# Patient Record
Sex: FEMALE | Race: WHITE | Employment: UNEMPLOYED | URBAN - METROPOLITAN AREA
[De-identification: names, ages, dates, MRNs, and addresses within clinical notes are randomized per-mention and may not be internally consistent; named-entity substitution may affect disease eponyms.]

---

## 2017-02-08 ENCOUNTER — TELEPHONE (OUTPATIENT)
Dept: FAMILY MEDICINE CLINIC | Age: 52
End: 2017-02-08

## 2017-02-08 NOTE — TELEPHONE ENCOUNTER
Pt stated the Dr doing her neck surg will not sched her surg until she gets some information from her Dr. She didn't know what Dr or what information. I informed to call the surg office and speak to his nurse and get that information in order to expedite the process. Pt verbalized understanding.

## 2017-02-21 ENCOUNTER — OFFICE VISIT (OUTPATIENT)
Dept: NEUROLOGY | Age: 52
End: 2017-02-21

## 2017-02-21 VITALS
RESPIRATION RATE: 20 BRPM | TEMPERATURE: 98.4 F | DIASTOLIC BLOOD PRESSURE: 76 MMHG | HEART RATE: 92 BPM | SYSTOLIC BLOOD PRESSURE: 128 MMHG | HEIGHT: 60 IN | OXYGEN SATURATION: 97 % | WEIGHT: 202.6 LBS | BODY MASS INDEX: 39.78 KG/M2

## 2017-02-21 DIAGNOSIS — R56.9 SEIZURE (HCC): Primary | ICD-10-CM

## 2017-02-21 NOTE — PROGRESS NOTES
Neurology Consult      Subjective:   History of  Araceli Motta is a 46 y.o. female  who comes in with her daughter as her principal spokesperson. The history totally from patient and daughter includes the following. Says he had an initial febrile state and comatose for prolonged period of 3 months. I have no idea about the intimate details or ramifications. Then later at age 11 had her first seizure in [place]. Would end up going through a series of different medications that either did not work and/or could not tolerate. Family history negative for seizures. Is not aware of any other  history or additional details as it might apply to trauma additional infections etc. has spent 20+ years at [facility] in the neurology department with seizure management. Is currently on Keppra 750 mg twice daily and Lamictal 200 mg twice daily and carbamazepine 300 mg twice daily and Klonopin 0.5 mg 3 times daily. Has been on this combination of medicines in recent years. Cannot go higher on the Keppra due to sedative effects etc. they have tried to decrease several of her seizure medicines with increasing seizure production thereafter. Phenobarbital causes hallucinations. Currently has a daily random staring episode and muscle tension for several minutes. Says she feels funny before hand and this means a combination of feeling fainty and scared. Has her episode for several minutes and is postictal for at least several seconds. Then has an urgency to go to the bathroom and urinate. Has had grand mal seizures which sound secondarily generalized by type, and none in recent years. Apparently no true warning of significance and has a sudden generalized tonic-clonic event of 5-6 minutes and is postictal for 10+ minutes. Claims compliance with medicine and gets adequate sleep. Was in special education and apparently had a modified high school diploma? Never gainfully employed.   Always in the accompaniment of family as she is currently with her daughter. Last EEG in the last year or 2 and results unknown. Last scan was 5+ years ago perhaps MRI and demonstrating tissue damage in what she says is the left temporal lobe. Apparently with neck symptoms and an MRI of the neck in November with moderate to severe central spinal stenosis with DDD and DJD C5-6 through C7-T1. Apparently a consideration for surgery but [facility] says they do not approve of her going under general anesthesia with her seizure history. She happens to have an appointment in April with the [address] [place] [address] staff. Current Outpatient Prescriptions   Medication Sig Dispense Refill    carBAMazepine ER (CARBATROL ER) 300 mg capsule Take 300 mg by mouth two (2) times a day.  levETIRAcetam (KEPPRA) 750 mg tablet Take 750 mg by mouth two (2) times a day.  lamoTRIgine (LAMICTAL) 200 mg tablet Take 200 mg by mouth two (2) times a day.  clonazePAM (KLONOPIN) 0.5 mg tablet Take 0.5 mg by mouth three (3) times daily. Allergies   Allergen Reactions    Aspirin Other (comments)     Chest pain    Codeine Other (comments)     Chest pain      Phenobarbital Other (comments)     hallucinate     Past Medical History   Diagnosis Date    Degenerative disc disease, cervical      C2 C3    Headache     Seizures (HCC)       Past Surgical History   Procedure Laterality Date    Hx orthopaedic       right knee      Social History     Social History    Marital status: SINGLE     Spouse name: N/A    Number of children: N/A    Years of education: N/A     Occupational History    Not on file.      Social History Main Topics    Smoking status: Never Smoker    Smokeless tobacco: Never Used    Alcohol use No    Drug use: No    Sexual activity: No     Other Topics Concern    Not on file     Social History Narrative      Family History   Problem Relation Age of Onset    Hypertension Father     Diabetes Maternal Grandmother     Emphysema Mother Visit Vitals    /76    Pulse 92    Temp 98.4 °F (36.9 °C) (Oral)    Resp 20    Ht 5' (1.524 m)    Wt 91.9 kg (202 lb 9.6 oz)    SpO2 97%    BMI 39.57 kg/m2        Review of Systems:   A comprehensive review of systems was negative except for that written in the HPI. Neuro Exam:     Appearance: The patient is well developed, well nourished, provides a partial coherent history and is in no acute distress. Mental Status: Oriented to time, place and person. Mood and affect appropriate. Cranial Nerves:   Intact visual fields. Fundi are benign. TANNER, EOM's full, no nystagmus, no ptosis. Facial sensation is normal. Corneal reflexes are intact. Facial movement is symmetric. Hearing is normal bilaterally. Palate is midline with normal sternocleidomastoid and trapezius muscles are normal. Tongue is midline. Motor:  5/5 strength in upper and lower proximal and distal muscles. Normal bulk and tone. No fasciculations. Reflexes:   Deep tendon reflexes 2+/4 and symmetrical.   Sensory:   Normal to touch, pinprick and vibration. Gait:  Normal gait. Tremor:   No tremor noted. Cerebellar:  No cerebellar signs present. Neurovascular:  Normal heart sounds and regular rhythm, peripheral pulses intact, and no carotid bruits. Assessment:   History of intractable seizures. I respect 20+ years of seizure surveillance at the [facility]. Would suggest she keep her April appointment with the neurology clinic and find out why they are anxious about her going under general anesthesia. Currently has daily seizures of complex partial type by description. Potential for secondarily generalized seizures as mentioned in HPI. Will not manipulate medicine and information provided solely between patient and daughter. Plan:   RV as needed. ...    Signed by :  Shady Castro MD

## 2017-02-21 NOTE — MR AVS SNAPSHOT
Visit Information Date & Time Provider Department Dept. Phone Encounter #  
 2/21/2017  2:00 PM Elizabeth Brennan MD Neurology [phone] Follow-up Instructions Return if symptoms worsen or fail to improve. Upcoming Health Maintenance Date Due Hepatitis C Screening 1965 DTaP/Tdap/Td series (1 - Tdap) 6/17/1986 PAP AKA CERVICAL CYTOLOGY 6/17/1986 BREAST CANCER SCRN MAMMOGRAM 6/17/2015 FOBT Q 1 YEAR AGE 50-75 6/17/2015 INFLUENZA AGE 9 TO ADULT 8/1/2016 Allergies as of 2/21/2017  Review Complete On: 11/9/2016 By: Chaitanya Melgoza NP Severity Noted Reaction Type Reactions Aspirin  06/01/2015    Other (comments) Chest pain Codeine  06/01/2015    Other (comments) Chest pain Phenobarbital  06/01/2015    Other (comments)  
 hallucinate Current Immunizations  Never Reviewed No immunizations on file. Not reviewed this visit You Were Diagnosed With   
  
 Codes Comments Seizure ([facility].)    -  Primary ICD-10-CM: R56.9 ICD-9-CM: 780.39 Vitals BP Pulse Temp Resp Height(growth percentile) Weight(growth percentile) 128/76 92 98.4 °F (36.9 °C) (Oral) 20 5' (1.524 m) 202 lb 9.6 oz (91.9 kg) SpO2 BMI OB Status Smoking Status 97% 39.57 kg/m2 Menopause Never Smoker Vitals History BMI and BSA Data Body Mass Index Body Surface Area  
 39.57 kg/m 2 1.97 m 2 Preferred Pharmacy Pharmacy Name Phone [id number] [phone] Your Updated Medication List  
  
   
This list is accurate as of: 2/21/17  2:33 PM.  Always use your most recent med list.  
  
  
  
  
 carBAMazepine  mg capsule Commonly known as:  CARBATROL ER Take 300 mg by mouth two (2) times a day. clonazePAM 0.5 mg tablet Commonly known as:  Catha Boni Take 0.5 mg by mouth three (3) times daily. lamoTRIgine 200 mg tablet Commonly known as: LaMICtal  
Take 200 mg by mouth two (2) times a day. levETIRAcetam 750 mg tablet Commonly known as:  KEPPRA Take 750 mg by mouth two (2) times a day. Follow-up Instructions Return if symptoms worsen or fail to improve. Patient Instructions Information Regarding Testing If you have physican order for a test or a medication denied by your insurance company, this does not mean the test or medication is not appropriate for you as that is a medical decision, not a decision to be made by an insurance company representative or by an [facility] physician who has not interviewed and examined you. This is a decision to be made between you and your physician. The denial of services is a contractual matter between you and your insurance company, not an issue between your physician and the insurance company. If your test or medication is denied, you can take the following steps to help resolve the issue: 1. File a complaint with the [facility] regarding your insurance company's denial of services ordered for you. You can do this either by calling them directly or by completing an on-line complaint form on the [employer]. This can be found at [email or web address] 2. Also file a formal complaint with your insurance company and ask to have the name of the person denying the service so that you may explore a legal option should you be harmed by this denial of service. Again, the fact the insurance company will not pay for the service does not mean it is not medically necessary and I would encourage you to follow through with the plan that was made with your physician 3.    File a written complaint with your employer so your employer and benefit manager is aware of the poor coverage they are providing their employees. If you have medicare/medicaid, complain to your representative in the House and to your [place]  [employer]. If we have ordered testing for you, we do not call patients with results and we do not give test results over the phone. We schedule follow up appointments so that your results can be discussed in person and any questions you have regarding them may be addressed. If something of concern is revealed on your test, we will call you for a sooner follow up appointment. Additionally, results may be found by using the My Chart feature and one of our patient service representatives at the  can give you instructions on how to access this feature of our electronic medical record system. Seizure: Care Instructions Your Care Instructions Seizures are caused by abnormal patterns of electrical signals in the brain. They are different for each person. Seizures can affect movement, speech, vision, or awareness. Some people have only slight shaking of a hand and do not pass out. Other people may pass out and have violent shaking of the whole body. Some people appear to stare into space. They are awake, but they can't respond normally. Later, they may not remember what happened. You may need tests to identify the type and cause of the seizures. A seizure may occur only once, or you may have them more than one time. Taking medicines as directed and following up with your doctor may help keep you from having more seizures. The doctor has checked you carefully, but problems can develop later. If you notice any problems or new symptoms, get medical treatment right away. Follow-up care is a key part of your treatment and safety. Be sure to make and go to all appointments, and call your doctor if you are having problems. It's also a good idea to know your test results and keep a list of the medicines you take. How can you care for yourself at home? · Be safe with medicines. Take your medicines exactly as prescribed. Call your doctor if you think you are having a problem with your medicine. · Do not do any activity that could be dangerous to you or others until your doctor says it is safe to do so. For example, do not drive a car, operate machinery, swim, or climb ladders. · Be sure that anyone treating you for any health problem knows that you have had a seizure and what medicines you are taking for it. · Identify and avoid things that may make you more likely to have a seizure. These may include lack of sleep, alcohol or drug use, stress, or not eating. · Make sure you go to your follow-up appointment. When should you call for help? Call 911 anytime you think you may need emergency care. For example, call if: 
· You have another seizure. · You have more than one seizure in 24 hours. · You have new symptoms, such as trouble walking, speaking, or thinking clearly. Call your doctor now or seek immediate medical care if: 
· You are not acting normally. Watch closely for changes in your health, and be sure to contact your doctor if you have any problems. Where can you learn more? Go to http://robert-navjot.info/. Enter K478 in the search box to learn more about \"Seizure: Care Instructions. \" Current as of: February 19, 2016 Content Version: 11.1 © 4037-8373 [employer]. Care instructions adapted under license by [employer] (which disclaims liability or warranty for this information). If you have questions about a medical condition or this instruction, always ask your healthcare professional. [place] [address] any warranty or liability for your use of this information. Seizure: Care Instructions Your Care Instructions Seizures are caused by abnormal patterns of electrical signals in the brain. They are different for each person. Seizures can affect movement, speech, vision, or awareness. Some people have only slight shaking of a hand and do not pass out. Other people may pass out and have violent shaking of the whole body. Some people appear to stare into space. They are awake, but they can't respond normally. Later, they may not remember what happened. You may need tests to identify the type and cause of the seizures. A seizure may occur only once, or you may have them more than one time. Taking medicines as directed and following up with your doctor may help keep you from having more seizures. The doctor has checked you carefully, but problems can develop later. If you notice any problems or new symptoms, get medical treatment right away. Follow-up care is a key part of your treatment and safety. Be sure to make and go to all appointments, and call your doctor if you are having problems. It's also a good idea to know your test results and keep a list of the medicines you take. How can you care for yourself at home? · Be safe with medicines. Take your medicines exactly as prescribed. Call your doctor if you think you are having a problem with your medicine. · Do not do any activity that could be dangerous to you or others until your doctor says it is safe to do so. For example, do not drive a car, operate machinery, swim, or climb ladders. · Be sure that anyone treating you for any health problem knows that you have had a seizure and what medicines you are taking for it. · Identify and avoid things that may make you more likely to have a seizure. These may include lack of sleep, alcohol or drug use, stress, or not eating. · Make sure you go to your follow-up appointment. When should you call for help? Call 911 anytime you think you may need emergency care. For example, call if: 
· You have another seizure. · You have more than one seizure in 24 hours.  
· You have new symptoms, such as trouble walking, speaking, or thinking clearly. Call your doctor now or seek immediate medical care if: 
· You are not acting normally. Watch closely for changes in your health, and be sure to contact your doctor if you have any problems. Where can you learn more? Go to http://robert-navjot.info/. Enter O735 in the search box to learn more about \"Seizure: Care Instructions. \" Current as of: February 19, 2016 Content Version: 11.1 © 9248-9469 [employer]. Care instructions adapted under license by [employer] (which disclaims liability or warranty for this information). If you have questions about a medical condition or this instruction, always ask your healthcare professional. [place] [address] any warranty or liability for your use of this information. Seizure: Care Instructions Your Care Instructions Seizures are caused by abnormal patterns of electrical signals in the brain. They are different for each person. Seizures can affect movement, speech, vision, or awareness. Some people have only slight shaking of a hand and do not pass out. Other people may pass out and have violent shaking of the whole body. Some people appear to stare into space. They are awake, but they can't respond normally. Later, they may not remember what happened. You may need tests to identify the type and cause of the seizures. A seizure may occur only once, or you may have them more than one time. Taking medicines as directed and following up with your doctor may help keep you from having more seizures. The doctor has checked you carefully, but problems can develop later. If you notice any problems or new symptoms, get medical treatment right away. Follow-up care is a key part of your treatment and safety. Be sure to make and go to all appointments, and call your doctor if you are having problems. It's also a good idea to know your test results and keep a list of the medicines you take. How can you care for yourself at home? · Be safe with medicines. Take your medicines exactly as prescribed. Call your doctor if you think you are having a problem with your medicine. · Do not do any activity that could be dangerous to you or others until your doctor says it is safe to do so. For example, do not drive a car, operate machinery, swim, or climb ladders. · Be sure that anyone treating you for any health problem knows that you have had a seizure and what medicines you are taking for it. · Identify and avoid things that may make you more likely to have a seizure. These may include lack of sleep, alcohol or drug use, stress, or not eating. · Make sure you go to your follow-up appointment. When should you call for help? Call 911 anytime you think you may need emergency care. For example, call if: 
· You have another seizure. · You have more than one seizure in 24 hours. · You have new symptoms, such as trouble walking, speaking, or thinking clearly. Call your doctor now or seek immediate medical care if: 
· You are not acting normally. Watch closely for changes in your health, and be sure to contact your doctor if you have any problems. Where can you learn more? Go to http://robert-navjot.info/. Enter X203 in the search box to learn more about \"Seizure: Care Instructions. \" Current as of: February 19, 2016 Content Version: 11.1 © 8853-4674 Healthwise, Incorporated. Care instructions adapted under license by Reg[employer] (which disclaims liability or warranty for this information). If you have questions about a medical condition or this instruction, always ask your healthcare professional. [address] any warranty or liability for your use of this information. Zach Becerra 4370 What is a living will?  
A living will is a legal form you use to write down the kind of care you want at the end of your life. It is used by the health professionals who will treat you if you aren't able to decide for yourself. If you put your wishes in writing, your loved ones and others will know what kind of care you want. They won't need to guess. This can ease your mind and be helpful to others. A living will is not the same as an estate or property will. An estate will explains what you want to happen with your money and property after you die. Is a living will a legal document? A living will is a legal document. Each state has its own laws about living [place]. If you move to another state, make sure that your living will is legal in the state where you now live. Or you might use a universal form that has been approved by many states. This kind of form can sometimes be completed and stored online. Your electronic copy will then be available wherever you have a connection to the Internet. In most cases, doctors will respect your wishes even if you have a form from a different state. · You don't need an  to complete a living will. But legal advice can be helpful if your state's laws are unclear, your health history is complicated, or your family can't agree on what should be in your living will. · You can change your living will at any time. Some people find that their wishes about end-of-life care change as their health changes. · In addition to making a living will, think about completing a medical power of  form. This form lets you name the person you want to make end-of-life treatment decisions for you (your \"health care agent\") if you're not able to. Many hospitals and nursing homes will give you the forms you need to complete a living will and a medical power of . · Your living will is used only if you can't make or communicate decisions for yourself anymore.  If you become able to make decisions again, you can accept or refuse any treatment, no matter what you wrote in your living will. · Your state may offer an online registry. This is a place where you can store your living will online so the doctors and nurses who need to treat you can find it right away. What should you think about when creating a living will? Talk about your end-of-life wishes with your family members and your doctor. Let them know what you want. That way the people making decisions for you won't be surprised by your choices. Think about these questions as you make your living will: · Do you know enough about life support methods that might be used? If not, talk to your doctor so you know what might be done if you can't breathe on your own, your heart stops, or you're unable to swallow. · What things would you still want to be able to do after you receive life-support methods? Would you want to be able to walk? To speak? To eat on your own? To live without the help of machines? · If you have a choice, where do you want to be cared for? In your home? At a hospital or nursing home? · Do you want certain Pent[employer]ostal practices performed if you become very ill? · If you have a choice at the end of your life, where would you prefer to die? At home? In a hospital or nursing home? Somewhere else? · Would you prefer to be buried or cremated? · Do you want your organs to be donated after you die? What should you do with your living will? · Make sure that your family members and your health care agent have copies of your living will. · Give your doctor a copy of your living will to keep in your medical record. If you have more than one doctor, make sure that each one has a copy. · You may want to put a copy of your living will where it can be easily found. Where can you learn more? Go to http://robert-navjot.info/. Enter T047 in the search box to learn more about \"Learning About Living [place]. \" 
 Current as of: February 24, 2016 Content Version: 11.1 © 0244-0430 [employer]. Care instructions adapted under license by [employer] (which disclaims liability or warranty for this information). If you have questions about a medical condition or this instruction, always ask your healthcare professional. [address] any warranty or liability for your use of this information. My suggestion regarding the issue of surgery and her seizures is the following. Please keep April appointment with the neurology clinic at [facility]. Will not manipulate any medicines and I respect their decision regarding the advisability of not going under anesthesia at this time. Introducing hospitals & HEALTH SERVICES! Dear Julianna Barros: Thank you for requesting a [employer] account. Our records indicate that you already have an active [employer] account. You can access your account anytime at [email or web address] Did you know that you can access your hospital and ER discharge instructions at any time in [employer]? You can also review all of your test results from your hospital stay or ER visit. Additional Information If you have questions, please visit the Frequently Asked Questions section of the [employer] website at [email or web address] Remember, [employer] is NOT to be used for urgent needs. For medical emergencies, dial 911. Now available from your iPhone and Android! Please provide this summary of care documentation to your next provider. Your primary care clinician is listed as Dequan Jeong. If you have any questions after today's visit, please call [phone].

## 2017-02-21 NOTE — PATIENT INSTRUCTIONS
Information Regarding Testing     If you have physican order for a test or a medication denied by your insurance company, this does not mean the test or medication is not appropriate for you as that is a medical decision, not a decision to be made by an insurance company representative or by an [facility] physician who has not interviewed and examined you. This is a decision to be made between you and your physician. The denial of services is a contractual matter between you and your insurance company, not an issue between your physician and the insurance company. If your test or medication is denied, you can take the following steps to help resolve the issue:    1. File a complaint with the [facility] regarding your insurance company's denial of services ordered for you. You can do this either by calling them directly or by completing an on-line complaint form on the [employer]. This can be found at [email or web address]    2. Also file a formal complaint with your insurance company and ask to have the name of the person denying the service so that you may explore a legal option should you be harmed by this denial of service. Again, the fact the insurance company will not pay for the service does not mean it is not medically necessary and I would encourage you to follow through with the plan that was made with your physician    3. File a written complaint with your employer so your employer and benefit manager is aware of the poor coverage they are providing their employees. If you have medicare/medicaid, complain to your representative in the House and to your Reese Estevez. If we have ordered testing for you, we do not call patients with results and we do not give test results over the phone. We schedule follow up appointments so that your results can be discussed in person and any questions you have regarding them may be addressed.   If something of concern is revealed on your test, we will call you for a sooner follow up appointment. Additionally, results may be found by using the My Chart feature and one of our patient service representatives at the  can give you instructions on how to access this feature of our electronic medical record system. Seizure: Care Instructions  Your Care Instructions    Seizures are caused by abnormal patterns of electrical signals in the brain. They are different for each person. Seizures can affect movement, speech, vision, or awareness. Some people have only slight shaking of a hand and do not pass out. Other people may pass out and have violent shaking of the whole body. Some people appear to stare into space. They are awake, but they can't respond normally. Later, they may not remember what happened. You may need tests to identify the type and cause of the seizures. A seizure may occur only once, or you may have them more than one time. Taking medicines as directed and following up with your doctor may help keep you from having more seizures. The doctor has checked you carefully, but problems can develop later. If you notice any problems or new symptoms, get medical treatment right away. Follow-up care is a key part of your treatment and safety. Be sure to make and go to all appointments, and call your doctor if you are having problems. It's also a good idea to know your test results and keep a list of the medicines you take. How can you care for yourself at home? · Be safe with medicines. Take your medicines exactly as prescribed. Call your doctor if you think you are having a problem with your medicine. · Do not do any activity that could be dangerous to you or others until your doctor says it is safe to do so. For example, do not drive a car, operate machinery, swim, or climb ladders.   · Be sure that anyone treating you for any health problem knows that you have had a seizure and what medicines you are taking for it. · Identify and avoid things that may make you more likely to have a seizure. These may include lack of sleep, alcohol or drug use, stress, or not eating. · Make sure you go to your follow-up appointment. When should you call for help? Call 911 anytime you think you may need emergency care. For example, call if:  · You have another seizure. · You have more than one seizure in 24 hours. · You have new symptoms, such as trouble walking, speaking, or thinking clearly. Call your doctor now or seek immediate medical care if:  · You are not acting normally. Watch closely for changes in your health, and be sure to contact your doctor if you have any problems. Where can you learn more? Go to http://robert-navjot.info/. Enter H562 in the search box to learn more about \"Seizure: Care Instructions. \"  Current as of: February 19, 2016  Content Version: 11.1  © 0386-7202 [employer]. Care instructions adapted under license by [employer] (which disclaims liability or warranty for this information). If you have questions about a medical condition or this instruction, always ask your healthcare professional. [place] [address] any warranty or liability for your use of this information. Seizure: Care Instructions  Your Care Instructions    Seizures are caused by abnormal patterns of electrical signals in the brain. They are different for each person. Seizures can affect movement, speech, vision, or awareness. Some people have only slight shaking of a hand and do not pass out. Other people may pass out and have violent shaking of the whole body. Some people appear to stare into space. They are awake, but they can't respond normally. Later, they may not remember what happened. You may need tests to identify the type and cause of the seizures. A seizure may occur only once, or you may have them more than one time.  Taking medicines as directed and following up with your doctor may help keep you from having more seizures. The doctor has checked you carefully, but problems can develop later. If you notice any problems or new symptoms, get medical treatment right away. Follow-up care is a key part of your treatment and safety. Be sure to make and go to all appointments, and call your doctor if you are having problems. It's also a good idea to know your test results and keep a list of the medicines you take. How can you care for yourself at home? · Be safe with medicines. Take your medicines exactly as prescribed. Call your doctor if you think you are having a problem with your medicine. · Do not do any activity that could be dangerous to you or others until your doctor says it is safe to do so. For example, do not drive a car, operate machinery, swim, or climb ladders. · Be sure that anyone treating you for any health problem knows that you have had a seizure and what medicines you are taking for it. · Identify and avoid things that may make you more likely to have a seizure. These may include lack of sleep, alcohol or drug use, stress, or not eating. · Make sure you go to your follow-up appointment. When should you call for help? Call 911 anytime you think you may need emergency care. For example, call if:  · You have another seizure. · You have more than one seizure in 24 hours. · You have new symptoms, such as trouble walking, speaking, or thinking clearly. Call your doctor now or seek immediate medical care if:  · You are not acting normally. Watch closely for changes in your health, and be sure to contact your doctor if you have any problems. Where can you learn more? Go to http://robert-navjot.info/. Enter S862 in the search box to learn more about \"Seizure: Care Instructions. \"  Current as of: February 19, 2016  Content Version: 11.1  © 1567-1860 [employer].  Care instructions adapted under license by [employer] (which disclaims liability or warranty for this information). If you have questions about a medical condition or this instruction, always ask your healthcare professional. [address] any warranty or liability for your use of this information. Seizure: Care Instructions  Your Care Instructions    Seizures are caused by abnormal patterns of electrical signals in the brain. They are different for each person. Seizures can affect movement, speech, vision, or awareness. Some people have only slight shaking of a hand and do not pass out. Other people may pass out and have violent shaking of the whole body. Some people appear to stare into space. They are awake, but they can't respond normally. Later, they may not remember what happened. You may need tests to identify the type and cause of the seizures. A seizure may occur only once, or you may have them more than one time. Taking medicines as directed and following up with your doctor may help keep you from having more seizures. The doctor has checked you carefully, but problems can develop later. If you notice any problems or new symptoms, get medical treatment right away. Follow-up care is a key part of your treatment and safety. Be sure to make and go to all appointments, and call your doctor if you are having problems. It's also a good idea to know your test results and keep a list of the medicines you take. How can you care for yourself at home? · Be safe with medicines. Take your medicines exactly as prescribed. Call your doctor if you think you are having a problem with your medicine. · Do not do any activity that could be dangerous to you or others until your doctor says it is safe to do so. For example, do not drive a car, operate machinery, swim, or climb ladders.   · Be sure that anyone treating you for any health problem knows that you have had a seizure and what medicines you are taking for it. · Identify and avoid things that may make you more likely to have a seizure. These may include lack of sleep, alcohol or drug use, stress, or not eating. · Make sure you go to your follow-up appointment. When should you call for help? Call 911 anytime you think you may need emergency care. For example, call if:  · You have another seizure. · You have more than one seizure in 24 hours. · You have new symptoms, such as trouble walking, speaking, or thinking clearly. Call your doctor now or seek immediate medical care if:  · You are not acting normally. Watch closely for changes in your health, and be sure to contact your doctor if you have any problems. Where can you learn more? Go to http://robert-navjot.info/. Enter K042 in the search box to learn more about \"Seizure: Care Instructions. \"  Current as of: February 19, 2016  Content Version: 11.1  © 4260-0912 [employer]. Care instructions adapted under license by [employer] (which disclaims liability or warranty for this information). If you have questions about a medical condition or this instruction, always ask your healthcare professional. [place] [address] any warranty or liability for your use of this information. Learning About Living [place]  What is a living will? A living will is a legal form you use to write down the kind of care you want at the end of your life. It is used by the health professionals who will treat you if you aren't able to decide for yourself. If you put your wishes in writing, your loved ones and others will know what kind of care you want. They won't need to guess. This can ease your mind and be helpful to others. A living will is not the same as an estate or property will. An estate will explains what you want to happen with your money and property after you die. Is a living will a legal document? A living will is a legal document.  Each state has its own laws about living [place]. If you move to another state, make sure that your living will is legal in the state where you now live. Or you might use a universal form that has been approved by many states. This kind of form can sometimes be completed and stored online. Your electronic copy will then be available wherever you have a connection to the Internet. In most cases, doctors will respect your wishes even if you have a form from a different state. · You don't need an  to complete a living will. But legal advice can be helpful if your state's laws are unclear, your health history is complicated, or your family can't agree on what should be in your living will. · You can change your living will at any time. Some people find that their wishes about end-of-life care change as their health changes. · In addition to making a living will, think about completing a medical power of  form. This form lets you name the person you want to make end-of-life treatment decisions for you (your \"health care agent\") if you're not able to. Many hospitals and nursing homes will give you the forms you need to complete a living will and a medical power of . · Your living will is used only if you can't make or communicate decisions for yourself anymore. If you become able to make decisions again, you can accept or refuse any treatment, no matter what you wrote in your living will. · Your state may offer an online registry. This is a place where you can store your living will online so the doctors and nurses who need to treat you can find it right away. What should you think about when creating a living will? Talk about your end-of-life wishes with your family members and your doctor. Let them know what you want. That way the people making decisions for you won't be surprised by your choices.   Think about these questions as you make your living will:  · Do you know enough about life support methods that might be used? If not, talk to your doctor so you know what might be done if you can't breathe on your own, your heart stops, or you're unable to swallow. · What things would you still want to be able to do after you receive life-support methods? Would you want to be able to walk? To speak? To eat on your own? To live without the help of machines? · If you have a choice, where do you want to be cared for? In your home? At a hospital or nursing home? · Do you want certain Adventist practices performed if you become very ill? · If you have a choice at the end of your life, where would you prefer to die? At home? In a hospital or nursing home? Somewhere else? · Would you prefer to be buried or cremated? · Do you want your organs to be donated after you die? What should you do with your living will? · Make sure that your family members and your health care agent have copies of your living will. · Give your doctor a copy of your living will to keep in your medical record. If you have more than one doctor, make sure that each one has a copy. · You may want to put a copy of your living will where it can be easily found. Where can you learn more? Go to http://robert-navjot.info/. Enter J601 in the search box to learn more about \"Learning About Living [place]. \"  Current as of: February 24, 2016  Content Version: 11.1  © 9821-0384 [employer]. Care instructions adapted under license by [employer] (which disclaims liability or warranty for this information). If you have questions about a medical condition or this instruction, always ask your healthcare professional. [place] [address] any warranty or liability for your use of this information. My suggestion regarding the issue of surgery and her seizures is the following. Please keep April appointment with the neurology clinic at [facility].   Will not manipulate any medicines and I respect their decision regarding the advisability of not going under anesthesia at this time.

## 2017-02-22 ENCOUNTER — TELEPHONE (OUTPATIENT)
Dept: NEUROLOGY | Age: 52
End: 2017-02-22

## 2017-02-22 NOTE — TELEPHONE ENCOUNTER
Adilia from [facility] wants to have call back from you please regarding to the pt last visit . Pt will have surgery and they want some information # [phone] ex [phone].  Thank you

## 2017-02-27 ENCOUNTER — TELEPHONE (OUTPATIENT)
Dept: NEUROLOGY | Age: 52
End: 2017-02-27

## 2017-02-27 NOTE — TELEPHONE ENCOUNTER
Please send last office not, also let them know has patient been cleared for surgery?    Fax: [phone]  Phone: [phone]

## 2017-03-06 NOTE — TELEPHONE ENCOUNTER
marychuy needs last visit notice for pt to clear her for surgery.  They would like to have call back for  you please ,also # [phone] and fax #[phone]

## 2017-05-22 ENCOUNTER — OFFICE VISIT (OUTPATIENT)
Dept: FAMILY MEDICINE CLINIC | Age: 52
End: 2017-05-22

## 2017-05-22 VITALS
TEMPERATURE: 97.8 F | DIASTOLIC BLOOD PRESSURE: 78 MMHG | WEIGHT: 204.38 LBS | BODY MASS INDEX: 40.13 KG/M2 | HEART RATE: 87 BPM | RESPIRATION RATE: 16 BRPM | SYSTOLIC BLOOD PRESSURE: 144 MMHG | HEIGHT: 60 IN | OXYGEN SATURATION: 97 %

## 2017-05-22 DIAGNOSIS — Z01.818 PREOP EXAMINATION: ICD-10-CM

## 2017-05-22 DIAGNOSIS — M50.30 DDD (DEGENERATIVE DISC DISEASE), CERVICAL: Primary | ICD-10-CM

## 2017-05-22 DIAGNOSIS — R30.0 DYSURIA: ICD-10-CM

## 2017-05-22 LAB
BILIRUB UR QL STRIP: NEGATIVE
GLUCOSE UR-MCNC: NEGATIVE MG/DL
KETONES P FAST UR STRIP-MCNC: NEGATIVE MG/DL
PH UR STRIP: 6 [PH] (ref 4.6–8)
PROT UR QL STRIP: NEGATIVE MG/DL
SP GR UR STRIP: 1.02 (ref 1–1.03)
UA UROBILINOGEN AMB POC: NORMAL (ref 0.2–1)
URINALYSIS CLARITY POC: NORMAL
URINALYSIS COLOR POC: YELLOW
URINE BLOOD POC: NEGATIVE
URINE LEUKOCYTES POC: NORMAL
URINE NITRITES POC: NEGATIVE

## 2017-05-22 RX ORDER — CIPROFLOXACIN 500 MG/1
500 TABLET ORAL 2 TIMES DAILY
Qty: 10 TAB | Refills: 0 | Status: SHIPPED | OUTPATIENT
Start: 2017-05-22 | End: 2017-05-27

## 2017-05-22 RX ORDER — HYDROCODONE BITARTRATE AND ACETAMINOPHEN 5; 325 MG/1; MG/1
TABLET ORAL
COMMUNITY
Start: 2017-05-21 | End: 2017-06-07

## 2017-05-22 NOTE — MR AVS SNAPSHOT
Visit Information Date & Time Provider Department Dept. Phone Encounter #  
 5/22/2017  1:30 PM Kam Perrin NP [address] [facility] [phone] [id number] Upcoming Health Maintenance Date Due Hepatitis C Screening 1965 DTaP/Tdap/Td series (1 - Tdap) 6/17/1986 PAP AKA CERVICAL CYTOLOGY 6/17/1986 BREAST CANCER SCRN MAMMOGRAM 6/17/2015 FOBT Q 1 YEAR AGE 50-75 6/17/2015 INFLUENZA AGE 9 TO ADULT 8/1/2017 Allergies as of 5/22/2017  Review Complete On: 5/22/2017 By: Evan Davis LPN Severity Noted Reaction Type Reactions Aspirin  06/01/2015    Other (comments) Chest pain Codeine  06/01/2015    Other (comments) Chest pain Phenobarbital  06/01/2015    Other (comments)  
 hallucinate Current Immunizations  Never Reviewed No immunizations on file. Not reviewed this visit You Were Diagnosed With   
  
 Codes Comments DDD (degenerative disc disease), cervical    -  Primary ICD-10-CM: M50.30 ICD-9-CM: 722.4 Preop examination     ICD-10-CM: S27.283 ICD-9-CM: V72.84 Dysuria     ICD-10-CM: R30.0 ICD-9-CM: 830. 1 Vitals BP Pulse Temp Resp Height(growth percentile) Weight(growth percentile) 144/78 87 97.8 °F (36.6 °C) (Oral) 16 5' (1.524 m) 204 lb 6 oz (92.7 kg) SpO2 BMI OB Status Smoking Status 97% 39.91 kg/m2 Menopause Never Smoker Vitals History BMI and BSA Data Body Mass Index Body Surface Area  
 39.91 kg/m 2 1.98 m 2 Preferred Pharmacy Pharmacy Name Phone [id number], [address] [facility] [phone] Your Updated Medication List  
  
   
This list is accurate as of: 5/22/17  2:05 PM.  Always use your most recent med list.  
  
  
  
  
 carBAMazepine  mg capsule Commonly known as:  CARBATROL ER Take 300 mg by mouth two (2) times a day. ciprofloxacin HCl 500 mg tablet Commonly known as:  CIPRO Take 1 Tab by mouth two (2) times a day for 5 days. clonazePAM 0.5 mg tablet Commonly known as:  Jodeen Levels Take 0.5 mg by mouth three (3) times daily. HYDROcodone-acetaminophen 5-325 mg per tablet Commonly known as:  NORCO  
  
 lamoTRIgine 200 mg tablet Commonly known as: LaMICtal  
Take 200 mg by mouth two (2) times a day. levETIRAcetam 750 mg tablet Commonly known as:  KEPPRA Take 750 mg by mouth two (2) times a day. Prescriptions Sent to Pharmacy Refills  
 ciprofloxacin HCl (CIPRO) 500 mg tablet 0 Sig: Take 1 Tab by mouth two (2) times a day for 5 days. Class: Normal  
 Pharmacy[id number], [address] [place] [address] Ph #: [phone] Route: Oral  
  
We Performed the Following AMB POC URINALYSIS DIP STICK AUTO W/O MICRO [99472 CPT(R)] To-Do List   
 05/23/2017 11:00 AM  
  Appointment with [facility] PAT ASSESSMENT C at [address] [place] [address] ([phone])  
  
 05/24/2017 5:30 PM  
  Appointment with [facility] CT 1 at [facility] RAD CT ([phone]) NON-CONTRAST STUDY: 1. Bring any non [facility] facility films/images pertaining to the area of interest with you on the day of appointment. 2. Check in at registration at least 30 minutes before appt time unless you were instructed to do otherwise. 3. If you have to drink oral contrast please pick it up any weekday prior to your appointment, if you cannot please check in 2 hrs  before appt time. Introducing [facility] & HEALTH SERVICES! Dear Leny Rhodes: Thank you for requesting a hovelstay account. Our records indicate that you already have an active hovelstay account. You can access your account anytime at [email or web address]. [email or web address] Systems Did you know that you can access your hospital and ER discharge instructions at any time in hovelstay? You can also review all of your test results from your hospital stay or ER visit. Additional Information If you have questions, please visit the Frequently Asked Questions section of the [email or web address] website at [email or web address] Remember, [employer] is NOT to be used for urgent needs. For medical emergencies, dial 911. Now available from your iPhone and Android! Please provide this summary of care documentation to your next provider. Your primary care clinician is listed as Shashi Alexander. If you have any questions after today's visit, please call [phone].

## 2017-05-22 NOTE — PROGRESS NOTES
Preoperative Evaluation    Date of Exam: 2017    Sindhu Howell is a 46 y.o. female (:1965) who presents for preoperative evaluation. She is having an Anterior Cervical Fusion with Dr. Marti Rodriguez on 2017. Latex Allergy: no    Problem List:     Patient Active Problem List    Diagnosis Date Noted    Convulsion ([address] 2015     Medical History:     Past Medical History:   Diagnosis Date    Degenerative disc disease, cervical     C2 C3    Headache     Seizures (HCC)      Allergies: Allergies   Allergen Reactions    Aspirin Other (comments)     Chest pain    Codeine Other (comments)     Chest pain      Phenobarbital Other (comments)     hallucinate      Medications:     Current Outpatient Prescriptions   Medication Sig    HYDROcodone-acetaminophen (NORCO) 5-325 mg per tablet     carBAMazepine ER (CARBATROL ER) 300 mg capsule Take 300 mg by mouth two (2) times a day.  levETIRAcetam (KEPPRA) 750 mg tablet Take 750 mg by mouth two (2) times a day.  lamoTRIgine (LAMICTAL) 200 mg tablet Take 200 mg by mouth two (2) times a day.  clonazePAM (KLONOPIN) 0.5 mg tablet Take 0.5 mg by mouth three (3) times daily. No current facility-administered medications for this visit. Surgical History:     Past Surgical History:   Procedure Laterality Date    HX ORTHOPAEDIC      right knee     Social History:     Social History     Social History    Marital status: SINGLE     Spouse name: N/A    Number of children: N/A    Years of education: N/A     Social History Main Topics    Smoking status: Never Smoker    Smokeless tobacco: Never Used    Alcohol use No    Drug use: No    Sexual activity: No     Other Topics Concern    None     Social History Narrative       Anesthesia Complications: None  History of abnormal bleeding : None  History of Blood Transfusions: no  Health Care Directive or Living Will: no    Objective:     ROS:   Feeling well.  No dyspnea or chest pain on exertion. No abdominal pain, change in bowel habits, black or bloody stools. No urinary tract symptoms. No neurological complaints. OBJECTIVE:   The patient appears well, alert, oriented x 3, in no distress. Visit Vitals    /78    Pulse 87    Temp 97.8 °F (36.6 °C) (Oral)    Resp 16    Ht 5' (1.524 m)    Wt 204 lb 6 oz (92.7 kg)    SpO2 97%    BMI 39.91 kg/m2     HEENT:ENT normal.  Neck supple. No adenopathy or thyromegaly. TANNER. Chest: Lungs are clear, good air entry, no wheezes, rhonchi or rales. Cardiovascular: S1 and S2 normal, no murmurs, regular rate and rhythm. Abdomen: soft without tenderness, guarding, mass or organomegaly. Extremities: show no edema, normal peripheral pulses. Neurological: is normal, no focal findings.     DIAGNOSTICS:   Will be done at [facility] testing office    IMPRESSION:   Low risk for planned surgery  No contraindications to planned surgery    Brianda Woodall NP   5/22/2017

## 2017-05-22 NOTE — PROGRESS NOTES
1. Have you been to the ER, urgent care clinic since your last visit? Hospitalized since your last visit? Yes Pt 1st x 4 wk for left leg pain    2. Have you seen or consulted any other health care providers outside of the [address] [place] [address] since your last visit? Include any pap smears or colon screening.  No    Chief Complaint   Patient presents with    Pre-op Exam     [facility] on 6/5/17 for neck surg

## 2017-05-23 ENCOUNTER — HOSPITAL ENCOUNTER (OUTPATIENT)
Dept: GENERAL RADIOLOGY | Age: 52
Discharge: HOME | End: 2017-05-23
Attending: ORTHOPAEDIC SURGERY
Payer: MEDICAID

## 2017-05-23 ENCOUNTER — HOSPITAL ENCOUNTER (OUTPATIENT)
Dept: PREADMISSION TESTING | Age: 52
Discharge: HOME | End: 2017-05-23
Payer: MEDICAID

## 2017-05-23 VITALS
BODY MASS INDEX: 35.97 KG/M2 | HEART RATE: 69 BPM | HEIGHT: 63 IN | WEIGHT: 203 LBS | SYSTOLIC BLOOD PRESSURE: 131 MMHG | OXYGEN SATURATION: 98 % | DIASTOLIC BLOOD PRESSURE: 64 MMHG | RESPIRATION RATE: 20 BRPM | TEMPERATURE: 97.6 F

## 2017-05-23 LAB
25(OH)D3 SERPL-MCNC: 14.2 NG/ML (ref 30–100)
ABO + RH BLD: NORMAL
ALBUMIN SERPL BCP-MCNC: 3.8 G/DL (ref 3.5–5)
ALBUMIN/GLOB SERPL: 1 {RATIO} (ref 1.1–2.2)
ALP SERPL-CCNC: 138 U/L (ref 45–117)
ALT SERPL-CCNC: 24 U/L (ref 12–78)
ANION GAP BLD CALC-SCNC: 4 MMOL/L (ref 5–15)
APPEARANCE UR: ABNORMAL
APTT PPP: 27.7 SEC (ref 22.1–32.5)
AST SERPL W P-5'-P-CCNC: 16 U/L (ref 15–37)
ATRIAL RATE: 68 BPM
BACTERIA URNS QL MICRO: ABNORMAL /HPF
BASOPHILS # BLD AUTO: 0 K/UL (ref 0–0.1)
BASOPHILS # BLD: 0 % (ref 0–1)
BILIRUB SERPL-MCNC: 0.3 MG/DL (ref 0.2–1)
BILIRUB UR QL: NEGATIVE
BLOOD GROUP ANTIBODIES SERPL: NORMAL
BUN SERPL-MCNC: 10 MG/DL (ref 6–20)
BUN/CREAT SERPL: 11 (ref 12–20)
CALCIUM SERPL-MCNC: 9.4 MG/DL (ref 8.5–10.1)
CALCULATED P AXIS, ECG09: 54 DEGREES
CALCULATED R AXIS, ECG10: 27 DEGREES
CALCULATED T AXIS, ECG11: 66 DEGREES
CHLORIDE SERPL-SCNC: 103 MMOL/L (ref 97–108)
CO2 SERPL-SCNC: 33 MMOL/L (ref 21–32)
COLOR UR: ABNORMAL
CREAT SERPL-MCNC: 0.94 MG/DL (ref 0.55–1.02)
DIAGNOSIS, 93000: NORMAL
EOSINOPHIL # BLD: 0.1 K/UL (ref 0–0.4)
EOSINOPHIL NFR BLD: 2 % (ref 0–7)
EPITH CASTS URNS QL MICRO: ABNORMAL /LPF
ERYTHROCYTE [DISTWIDTH] IN BLOOD BY AUTOMATED COUNT: 12.8 % (ref 11.5–14.5)
EST. AVERAGE GLUCOSE BLD GHB EST-MCNC: 105 MG/DL
GLOBULIN SER CALC-MCNC: 3.8 G/DL (ref 2–4)
GLUCOSE SERPL-MCNC: 79 MG/DL (ref 65–100)
GLUCOSE UR STRIP.AUTO-MCNC: NEGATIVE MG/DL
HBA1C MFR BLD: 5.3 % (ref 4.2–6.3)
HCT VFR BLD AUTO: 39 % (ref 35–47)
HGB BLD-MCNC: 13.1 G/DL (ref 11.5–16)
HGB UR QL STRIP: NEGATIVE
HYALINE CASTS URNS QL MICRO: ABNORMAL /LPF (ref 0–5)
INR PPP: 1 (ref 0.9–1.1)
KETONES UR QL STRIP.AUTO: NEGATIVE MG/DL
LEUKOCYTE ESTERASE UR QL STRIP.AUTO: ABNORMAL
LYMPHOCYTES # BLD AUTO: 48 % (ref 12–49)
LYMPHOCYTES # BLD: 2.9 K/UL (ref 0.8–3.5)
MCH RBC QN AUTO: 31.5 PG (ref 26–34)
MCHC RBC AUTO-ENTMCNC: 33.6 G/DL (ref 30–36.5)
MCV RBC AUTO: 93.8 FL (ref 80–99)
MONOCYTES # BLD: 0.4 K/UL (ref 0–1)
MONOCYTES NFR BLD AUTO: 6 % (ref 5–13)
NEUTS SEG # BLD: 2.6 K/UL (ref 1.8–8)
NEUTS SEG NFR BLD AUTO: 44 % (ref 32–75)
NITRITE UR QL STRIP.AUTO: NEGATIVE
P-R INTERVAL, ECG05: 170 MS
PH UR STRIP: 6 [PH] (ref 5–8)
PLATELET # BLD AUTO: 202 K/UL (ref 150–400)
POTASSIUM SERPL-SCNC: 4.3 MMOL/L (ref 3.5–5.1)
PROT SERPL-MCNC: 7.6 G/DL (ref 6.4–8.2)
PROT UR STRIP-MCNC: NEGATIVE MG/DL
PROTHROMBIN TIME: 10.2 SEC (ref 9–11.1)
Q-T INTERVAL, ECG07: 396 MS
QRS DURATION, ECG06: 86 MS
QTC CALCULATION (BEZET), ECG08: 421 MS
RBC # BLD AUTO: 4.16 M/UL (ref 3.8–5.2)
RBC #/AREA URNS HPF: ABNORMAL /HPF (ref 0–5)
SODIUM SERPL-SCNC: 140 MMOL/L (ref 136–145)
SP GR UR REFRACTOMETRY: 1.02 (ref 1–1.03)
SPECIMEN EXP DATE BLD: NORMAL
THERAPEUTIC RANGE,PTTT: NORMAL SECS (ref 58–77)
UA: UC IF INDICATED,UAUC: ABNORMAL
UROBILINOGEN UR QL STRIP.AUTO: 0.2 EU/DL (ref 0.2–1)
VENTRICULAR RATE, ECG03: 68 BPM
WBC # BLD AUTO: 5.9 K/UL (ref 3.6–11)
WBC URNS QL MICRO: ABNORMAL /HPF (ref 0–4)

## 2017-05-23 PROCEDURE — 86900 BLOOD TYPING SEROLOGIC ABO: CPT | Performed by: ORTHOPAEDIC SURGERY

## 2017-05-23 PROCEDURE — 85025 COMPLETE CBC W/AUTO DIFF WBC: CPT | Performed by: ORTHOPAEDIC SURGERY

## 2017-05-23 PROCEDURE — 71020 XR CHEST PA LAT: CPT

## 2017-05-23 PROCEDURE — 87086 URINE CULTURE/COLONY COUNT: CPT | Performed by: ORTHOPAEDIC SURGERY

## 2017-05-23 PROCEDURE — 93005 ELECTROCARDIOGRAM TRACING: CPT

## 2017-05-23 PROCEDURE — 85610 PROTHROMBIN TIME: CPT | Performed by: ORTHOPAEDIC SURGERY

## 2017-05-23 PROCEDURE — 81001 URINALYSIS AUTO W/SCOPE: CPT | Performed by: ORTHOPAEDIC SURGERY

## 2017-05-23 PROCEDURE — 83036 HEMOGLOBIN GLYCOSYLATED A1C: CPT | Performed by: ORTHOPAEDIC SURGERY

## 2017-05-23 PROCEDURE — 85730 THROMBOPLASTIN TIME PARTIAL: CPT | Performed by: ORTHOPAEDIC SURGERY

## 2017-05-23 PROCEDURE — 36415 COLL VENOUS BLD VENIPUNCTURE: CPT | Performed by: ORTHOPAEDIC SURGERY

## 2017-05-23 PROCEDURE — 80053 COMPREHEN METABOLIC PANEL: CPT | Performed by: ORTHOPAEDIC SURGERY

## 2017-05-23 PROCEDURE — 82306 VITAMIN D 25 HYDROXY: CPT | Performed by: ORTHOPAEDIC SURGERY

## 2017-05-23 RX ORDER — CHOLECALCIFEROL (VITAMIN D3) 125 MCG
CAPSULE ORAL DAILY
Status: ON HOLD | COMMUNITY
End: 2017-06-05

## 2017-05-23 NOTE — PERIOP NOTES
[facility]  PREOPERATIVE INSTRUCTIONS    Surgery Date:   6/5/2017  Surgery arrival time given by surgeon: NO   If no,[facility] [address] staff will call you between 4 PM- 8 PM the day before surgery with your arrival time. If your surgery is on a Monday, we will call you the preceding Friday. Please call [phone] after 8 PM if you did not receive your arrival time. 1. Please report at the designated time to the 2nd [address] [facility]. Bring your insurance card, photo identification, and any copayment ( if applicable). 2. You must have a responsible adult to drive you home. You need to have a responsible adult to stay with you the first 24 hours after surgery if you are going home the same day of your surgery and you should not drive a car for 24 hours following your surgery. 3. Nothing to eat or drink after midnight the night before surgery. This includes no water, gum, mints, coffee, juice, etc.  Please note special instructions, if applicable, below for medications. 4. MEDICATIONS TO TAKE THE MORNING OF SURGERY WITH A SIP OF WATER: carbamazepine,Clonaepam,Lamitatal,Keppra  5. No alcoholic beverages 24 hours before or after your surgery. 6. If you are being admitted to the hospital,please leave personal belongings/luggage in your car until you have an assigned hospital room number. 7. Stop Aspirin and/or any non-steroidal anti-inflammatory drugs (i.e. Ibuprofen, Naproxen, Advil, Aleve) as directed by your surgeon. You may take Tylenol. Stop herbal supplements 1 week prior to  surgery. 8. If you are currently taking Plavix, Coumadin,or any other blood-thinning/anticoagulant medication contact your surgeon for instructions. 9. Please wear comfortable clothes. Wear your glasses instead of contacts. We ask that all money, jewelry and valuables be left at home. Wear no make up, particularly mascara, the day of surgery. 10.  All body piercings, rings,and jewelry need to be removed and left at home.     Please wear your hair loose or down. Please no pony-tails, buns, or any metal hair accessories. If you shower the morning of surgery, please do not apply any lotions, powders, or deodorants afterwards. Do not shave any body area within 24 hours of your surgery. 11. Please follow all instructions to avoid any potential surgical cancellation. 12.  Should your physical condition change, (i.e. fever, cold, flu, etc.) please notify your surgeon as soon as possible. 13. It is important to be on time. If a situation occurs where you may be delayed, please call:  [phone] /  on the day of surgery. 14. The Preadmission Testing staff can be reached at 21 [phone]. .  15.  Special Instructions:  · Use Chlorhexidine Care Fusion wash and sponges 3 days prior to surgery as instructed. · Incentive spirometer given with instructions to practice at home and bring back to the hospital on the day of surgery. · [facility] will contact you if your Hemoglobin A1C is greater than 7.5. · Ensure/Glucerna  sample, nutritional information, and Ensure/Glucerna coupon given. · Pain pamphlet and Call Don't Fall reminder reviewed with patient. ·  parking is complimentary Monday - Friday 7 am - 5 pm  · Bring PTA Medication list day of surgery with the last doses taken documented   · Do not bring medication bottles the day of surgery    The patient was contacted  in person. She  verbalize  understanding of all instructions does not  need reinforcement.

## 2017-05-24 ENCOUNTER — HOSPITAL ENCOUNTER (OUTPATIENT)
Dept: CT IMAGING | Age: 52
Discharge: HOME | End: 2017-05-24
Attending: ORTHOPAEDIC SURGERY
Payer: MEDICAID

## 2017-05-24 DIAGNOSIS — M50.30 DDD (DEGENERATIVE DISC DISEASE), CERVICAL: ICD-10-CM

## 2017-05-24 DIAGNOSIS — M48.02 CERVICAL STENOSIS OF SPINE: ICD-10-CM

## 2017-05-24 DIAGNOSIS — M50.20 CERVICAL DISC HERNIATION: ICD-10-CM

## 2017-05-24 LAB
BACTERIA SPEC CULT: NORMAL
BACTERIA SPEC CULT: NORMAL
SERVICE CMNT-IMP: NORMAL

## 2017-05-24 PROCEDURE — 72125 CT NECK SPINE W/O DYE: CPT

## 2017-05-25 LAB
BACTERIA SPEC CULT: NORMAL
CC UR VC: NORMAL
SERVICE CMNT-IMP: NORMAL

## 2017-06-02 ENCOUNTER — ANESTHESIA EVENT (OUTPATIENT)
Dept: SURGERY | Age: 52
End: 2017-06-02
Payer: MEDICAID

## 2017-06-05 ENCOUNTER — APPOINTMENT (OUTPATIENT)
Dept: GENERAL RADIOLOGY | Age: 52
End: 2017-06-05
Attending: ORTHOPAEDIC SURGERY
Payer: MEDICAID

## 2017-06-05 ENCOUNTER — HOSPITAL ENCOUNTER (OUTPATIENT)
Age: 52
Setting detail: OBSERVATION
Discharge: HOME HEALTH | End: 2017-06-07
Attending: ORTHOPAEDIC SURGERY | Admitting: ORTHOPAEDIC SURGERY
Payer: MEDICAID

## 2017-06-05 ENCOUNTER — ANESTHESIA (OUTPATIENT)
Dept: SURGERY | Age: 52
End: 2017-06-05
Payer: MEDICAID

## 2017-06-05 PROBLEM — M48.02 CERVICAL STENOSIS OF SPINE: Status: ACTIVE | Noted: 2017-06-05

## 2017-06-05 PROCEDURE — 74011000250 HC RX REV CODE- 250

## 2017-06-05 PROCEDURE — 77030031139 HC SUT VCRL2 J&J -A: Performed by: ORTHOPAEDIC SURGERY

## 2017-06-05 PROCEDURE — C1713 ANCHOR/SCREW BN/BN,TIS/BN: HCPCS | Performed by: ORTHOPAEDIC SURGERY

## 2017-06-05 PROCEDURE — 74011000250 HC RX REV CODE- 250: Performed by: ORTHOPAEDIC SURGERY

## 2017-06-05 PROCEDURE — 65270000029 HC RM PRIVATE

## 2017-06-05 PROCEDURE — 77030013079 HC BLNKT BAIR HGGR 3M -A: Performed by: ANESTHESIOLOGY

## 2017-06-05 PROCEDURE — 74011250636 HC RX REV CODE- 250/636: Performed by: ANESTHESIOLOGY

## 2017-06-05 PROCEDURE — 74011000272 HC RX REV CODE- 272: Performed by: ORTHOPAEDIC SURGERY

## 2017-06-05 PROCEDURE — 77030002933 HC SUT MCRYL J&J -A: Performed by: ORTHOPAEDIC SURGERY

## 2017-06-05 PROCEDURE — 77030020274 HC MISC IMPL ORTHOPEDIC: Performed by: ORTHOPAEDIC SURGERY

## 2017-06-05 PROCEDURE — 74011250636 HC RX REV CODE- 250/636: Performed by: PHYSICIAN ASSISTANT

## 2017-06-05 PROCEDURE — 77030010507 HC ADH SKN DERMBND J&J -B: Performed by: ORTHOPAEDIC SURGERY

## 2017-06-05 PROCEDURE — 77030020061 HC IV BLD WRMR ADMIN SET 3M -B: Performed by: ANESTHESIOLOGY

## 2017-06-05 PROCEDURE — 99218 HC RM OBSERVATION: CPT

## 2017-06-05 PROCEDURE — 77030029099 HC BN WAX SSPC -A: Performed by: ORTHOPAEDIC SURGERY

## 2017-06-05 PROCEDURE — 77030003666 HC NDL SPINAL BD -A: Performed by: ORTHOPAEDIC SURGERY

## 2017-06-05 PROCEDURE — 77030034850: Performed by: ORTHOPAEDIC SURGERY

## 2017-06-05 PROCEDURE — 77030008467 HC STPLR SKN COVD -B: Performed by: ORTHOPAEDIC SURGERY

## 2017-06-05 PROCEDURE — 77030018836 HC SOL IRR NACL ICUM -A: Performed by: ORTHOPAEDIC SURGERY

## 2017-06-05 PROCEDURE — C1821 INTERSPINOUS IMPLANT: HCPCS | Performed by: ORTHOPAEDIC SURGERY

## 2017-06-05 PROCEDURE — 74011250636 HC RX REV CODE- 250/636: Performed by: ORTHOPAEDIC SURGERY

## 2017-06-05 PROCEDURE — 77030020268 HC MISC GENERAL SUPPLY: Performed by: ORTHOPAEDIC SURGERY

## 2017-06-05 PROCEDURE — 77030032490 HC SLV COMPR SCD KNE COVD -B

## 2017-06-05 PROCEDURE — 77030018673: Performed by: ORTHOPAEDIC SURGERY

## 2017-06-05 PROCEDURE — 74011250636 HC RX REV CODE- 250/636

## 2017-06-05 PROCEDURE — 77030008684 HC TU ET CUF COVD -B: Performed by: ANESTHESIOLOGY

## 2017-06-05 PROCEDURE — 76060000039 HC ANESTHESIA 4 TO 4.5 HR: Performed by: ORTHOPAEDIC SURGERY

## 2017-06-05 PROCEDURE — 77030012428 HC DSG ANTIMIC BARR S&N -A: Performed by: ORTHOPAEDIC SURGERY

## 2017-06-05 PROCEDURE — 74011250637 HC RX REV CODE- 250/637: Performed by: ORTHOPAEDIC SURGERY

## 2017-06-05 PROCEDURE — L0172 CERV COL SR FOAM 2PC PRE OTS: HCPCS | Performed by: ORTHOPAEDIC SURGERY

## 2017-06-05 PROCEDURE — 77030020782 HC GWN BAIR PAWS FLX 3M -B

## 2017-06-05 PROCEDURE — 74011250637 HC RX REV CODE- 250/637: Performed by: PHYSICIAN ASSISTANT

## 2017-06-05 PROCEDURE — C1763 CONN TISS, NON-HUMAN: HCPCS | Performed by: ORTHOPAEDIC SURGERY

## 2017-06-05 PROCEDURE — 76001 XR FLUOROSCOPY OVER 60 MINUTES: CPT

## 2017-06-05 PROCEDURE — 76210000016 HC OR PH I REC 1 TO 1.5 HR: Performed by: ORTHOPAEDIC SURGERY

## 2017-06-05 PROCEDURE — 77030011267 HC ELECTRD BLD COVD -A: Performed by: ORTHOPAEDIC SURGERY

## 2017-06-05 PROCEDURE — 77030011640 HC PAD GRND REM COVD -A: Performed by: ORTHOPAEDIC SURGERY

## 2017-06-05 PROCEDURE — 77030026438 HC STYL ET INTUB CARD -A: Performed by: ANESTHESIOLOGY

## 2017-06-05 PROCEDURE — 77030018846 HC SOL IRR STRL H20 ICUM -A: Performed by: ORTHOPAEDIC SURGERY

## 2017-06-05 PROCEDURE — 76010000175 HC OR TIME 4 TO 4.5 HR INTENSV-TIER 1: Performed by: ORTHOPAEDIC SURGERY

## 2017-06-05 PROCEDURE — 77030019908 HC STETH ESOPH SIMS -A: Performed by: ANESTHESIOLOGY

## 2017-06-05 PROCEDURE — 77030012935 HC DRSG AQUACEL BMS -B: Performed by: ORTHOPAEDIC SURGERY

## 2017-06-05 PROCEDURE — 77030037302 HC SPCR CERV LORDTC INLC -G: Performed by: ORTHOPAEDIC SURGERY

## 2017-06-05 DEVICE — SCREW SPNL L14MM DIA4MM CERV SELF STARTING CONSTRN LO PROF: Type: IMPLANTABLE DEVICE | Site: SPINE CERVICAL | Status: FUNCTIONAL

## 2017-06-05 DEVICE — GRAFT BNE 15X13X7MM SPCR CERV ALLGRFT CAPISTRANO: Type: IMPLANTABLE DEVICE | Site: SPINE CERVICAL | Status: FUNCTIONAL

## 2017-06-05 DEVICE — PLATE SPNL L36MM BILAT ANTR CERV TI 2 LEV CONSTRN LO PROF: Type: IMPLANTABLE DEVICE | Site: SPINE CERVICAL | Status: FUNCTIONAL

## 2017-06-05 RX ORDER — ONDANSETRON 2 MG/ML
INJECTION INTRAMUSCULAR; INTRAVENOUS AS NEEDED
Status: DISCONTINUED | OUTPATIENT
Start: 2017-06-05 | End: 2017-06-05 | Stop reason: HOSPADM

## 2017-06-05 RX ORDER — LAMOTRIGINE 100 MG/1
200 TABLET ORAL 2 TIMES DAILY
Status: DISCONTINUED | OUTPATIENT
Start: 2017-06-05 | End: 2017-06-07 | Stop reason: HOSPADM

## 2017-06-05 RX ORDER — CHOLECALCIFEROL TAB 125 MCG (5000 UNIT) 125 MCG
5000 TAB ORAL EVERY EVENING
COMMUNITY

## 2017-06-05 RX ORDER — FLUMAZENIL 0.1 MG/ML
0.2 INJECTION INTRAVENOUS
Status: DISCONTINUED | OUTPATIENT
Start: 2017-06-05 | End: 2017-06-05 | Stop reason: HOSPADM

## 2017-06-05 RX ORDER — SODIUM CHLORIDE 9 MG/ML
125 INJECTION, SOLUTION INTRAVENOUS CONTINUOUS
Status: DISPENSED | OUTPATIENT
Start: 2017-06-05 | End: 2017-06-06

## 2017-06-05 RX ORDER — FENTANYL CITRATE 50 UG/ML
INJECTION, SOLUTION INTRAMUSCULAR; INTRAVENOUS AS NEEDED
Status: DISCONTINUED | OUTPATIENT
Start: 2017-06-05 | End: 2017-06-05 | Stop reason: HOSPADM

## 2017-06-05 RX ORDER — LIDOCAINE HYDROCHLORIDE 20 MG/ML
INJECTION, SOLUTION EPIDURAL; INFILTRATION; INTRACAUDAL; PERINEURAL AS NEEDED
Status: DISCONTINUED | OUTPATIENT
Start: 2017-06-05 | End: 2017-06-05 | Stop reason: HOSPADM

## 2017-06-05 RX ORDER — CLONAZEPAM 0.5 MG/1
0.5 TABLET ORAL 3 TIMES DAILY
Status: DISCONTINUED | OUTPATIENT
Start: 2017-06-05 | End: 2017-06-07 | Stop reason: HOSPADM

## 2017-06-05 RX ORDER — HYDROCODONE BITARTRATE AND ACETAMINOPHEN 7.5; 325 MG/1; MG/1
2 TABLET ORAL
Status: DISCONTINUED | OUTPATIENT
Start: 2017-06-05 | End: 2017-06-05 | Stop reason: SDUPTHER

## 2017-06-05 RX ORDER — ONDANSETRON 2 MG/ML
4 INJECTION INTRAMUSCULAR; INTRAVENOUS
Status: ACTIVE | OUTPATIENT
Start: 2017-06-05 | End: 2017-06-06

## 2017-06-05 RX ORDER — NALOXONE HYDROCHLORIDE 0.4 MG/ML
0.4 INJECTION, SOLUTION INTRAMUSCULAR; INTRAVENOUS; SUBCUTANEOUS AS NEEDED
Status: DISCONTINUED | OUTPATIENT
Start: 2017-06-05 | End: 2017-06-07 | Stop reason: HOSPADM

## 2017-06-05 RX ORDER — SODIUM CHLORIDE 0.9 % (FLUSH) 0.9 %
5-10 SYRINGE (ML) INJECTION AS NEEDED
Status: DISCONTINUED | OUTPATIENT
Start: 2017-06-05 | End: 2017-06-05 | Stop reason: HOSPADM

## 2017-06-05 RX ORDER — SODIUM CHLORIDE, SODIUM LACTATE, POTASSIUM CHLORIDE, CALCIUM CHLORIDE 600; 310; 30; 20 MG/100ML; MG/100ML; MG/100ML; MG/100ML
125 INJECTION, SOLUTION INTRAVENOUS CONTINUOUS
Status: DISCONTINUED | OUTPATIENT
Start: 2017-06-05 | End: 2017-06-05 | Stop reason: HOSPADM

## 2017-06-05 RX ORDER — CEFAZOLIN SODIUM IN 0.9 % NACL 2 G/50 ML
2 INTRAVENOUS SOLUTION, PIGGYBACK (ML) INTRAVENOUS ONCE
Status: COMPLETED | OUTPATIENT
Start: 2017-06-05 | End: 2017-06-05

## 2017-06-05 RX ORDER — HYDROMORPHONE HYDROCHLORIDE 2 MG/ML
INJECTION, SOLUTION INTRAMUSCULAR; INTRAVENOUS; SUBCUTANEOUS AS NEEDED
Status: DISCONTINUED | OUTPATIENT
Start: 2017-06-05 | End: 2017-06-05 | Stop reason: HOSPADM

## 2017-06-05 RX ORDER — SUCCINYLCHOLINE CHLORIDE 20 MG/ML
INJECTION INTRAMUSCULAR; INTRAVENOUS AS NEEDED
Status: DISCONTINUED | OUTPATIENT
Start: 2017-06-05 | End: 2017-06-05 | Stop reason: HOSPADM

## 2017-06-05 RX ORDER — MELATONIN
5000 DAILY
Status: DISCONTINUED | OUTPATIENT
Start: 2017-06-06 | End: 2017-06-07 | Stop reason: HOSPADM

## 2017-06-05 RX ORDER — CELECOXIB 400 MG/1
400 CAPSULE ORAL ONCE
Status: COMPLETED | OUTPATIENT
Start: 2017-06-05 | End: 2017-06-05

## 2017-06-05 RX ORDER — POLYETHYLENE GLYCOL 3350 17 G/17G
17 POWDER, FOR SOLUTION ORAL DAILY
Status: DISCONTINUED | OUTPATIENT
Start: 2017-06-06 | End: 2017-06-07 | Stop reason: HOSPADM

## 2017-06-05 RX ORDER — ROCURONIUM BROMIDE 10 MG/ML
INJECTION, SOLUTION INTRAVENOUS AS NEEDED
Status: DISCONTINUED | OUTPATIENT
Start: 2017-06-05 | End: 2017-06-05 | Stop reason: HOSPADM

## 2017-06-05 RX ORDER — SODIUM CHLORIDE, SODIUM LACTATE, POTASSIUM CHLORIDE, CALCIUM CHLORIDE 600; 310; 30; 20 MG/100ML; MG/100ML; MG/100ML; MG/100ML
INJECTION, SOLUTION INTRAVENOUS
Status: DISCONTINUED | OUTPATIENT
Start: 2017-06-05 | End: 2017-06-05 | Stop reason: HOSPADM

## 2017-06-05 RX ORDER — HYDROCODONE BITARTRATE AND ACETAMINOPHEN 5; 325 MG/1; MG/1
1 TABLET ORAL
Status: DISCONTINUED | OUTPATIENT
Start: 2017-06-05 | End: 2017-06-07 | Stop reason: HOSPADM

## 2017-06-05 RX ORDER — SODIUM CHLORIDE 0.9 % (FLUSH) 0.9 %
5-10 SYRINGE (ML) INJECTION EVERY 8 HOURS
Status: DISCONTINUED | OUTPATIENT
Start: 2017-06-06 | End: 2017-06-07 | Stop reason: HOSPADM

## 2017-06-05 RX ORDER — NALOXONE HYDROCHLORIDE 0.4 MG/ML
0.2 INJECTION, SOLUTION INTRAMUSCULAR; INTRAVENOUS; SUBCUTANEOUS
Status: DISCONTINUED | OUTPATIENT
Start: 2017-06-05 | End: 2017-06-05 | Stop reason: HOSPADM

## 2017-06-05 RX ORDER — DIPHENHYDRAMINE HCL 12.5MG/5ML
12.5 ELIXIR ORAL
Status: ACTIVE | OUTPATIENT
Start: 2017-06-05 | End: 2017-06-06

## 2017-06-05 RX ORDER — METOPROLOL TARTRATE 5 MG/5ML
INJECTION INTRAVENOUS AS NEEDED
Status: DISCONTINUED | OUTPATIENT
Start: 2017-06-05 | End: 2017-06-05 | Stop reason: HOSPADM

## 2017-06-05 RX ORDER — CEFAZOLIN SODIUM IN 0.9 % NACL 2 G/50 ML
2 INTRAVENOUS SOLUTION, PIGGYBACK (ML) INTRAVENOUS EVERY 8 HOURS
Status: COMPLETED | OUTPATIENT
Start: 2017-06-05 | End: 2017-06-07

## 2017-06-05 RX ORDER — HYDROMORPHONE HYDROCHLORIDE 1 MG/ML
1 INJECTION, SOLUTION INTRAMUSCULAR; INTRAVENOUS; SUBCUTANEOUS
Status: DISCONTINUED | OUTPATIENT
Start: 2017-06-05 | End: 2017-06-07 | Stop reason: HOSPADM

## 2017-06-05 RX ORDER — HYDROCODONE BITARTRATE AND ACETAMINOPHEN 10; 325 MG/1; MG/1
1 TABLET ORAL
Status: DISCONTINUED | OUTPATIENT
Start: 2017-06-05 | End: 2017-06-07 | Stop reason: HOSPADM

## 2017-06-05 RX ORDER — HYDROCODONE BITARTRATE AND ACETAMINOPHEN 7.5; 325 MG/1; MG/1
1 TABLET ORAL
Status: DISCONTINUED | OUTPATIENT
Start: 2017-06-05 | End: 2017-06-07 | Stop reason: HOSPADM

## 2017-06-05 RX ORDER — FACIAL-BODY WIPES
10 EACH TOPICAL DAILY PRN
Status: DISCONTINUED | OUTPATIENT
Start: 2017-06-07 | End: 2017-06-07 | Stop reason: HOSPADM

## 2017-06-05 RX ORDER — DEXAMETHASONE SODIUM PHOSPHATE 10 MG/ML
10 INJECTION INTRAMUSCULAR; INTRAVENOUS EVERY 8 HOURS
Status: COMPLETED | OUTPATIENT
Start: 2017-06-05 | End: 2017-06-06

## 2017-06-05 RX ORDER — LIDOCAINE HYDROCHLORIDE 10 MG/ML
0.1 INJECTION, SOLUTION EPIDURAL; INFILTRATION; INTRACAUDAL; PERINEURAL AS NEEDED
Status: DISCONTINUED | OUTPATIENT
Start: 2017-06-05 | End: 2017-06-05 | Stop reason: HOSPADM

## 2017-06-05 RX ORDER — PROPOFOL 10 MG/ML
INJECTION, EMULSION INTRAVENOUS
Status: DISCONTINUED | OUTPATIENT
Start: 2017-06-05 | End: 2017-06-05 | Stop reason: HOSPADM

## 2017-06-05 RX ORDER — SODIUM CHLORIDE 0.9 % (FLUSH) 0.9 %
5-10 SYRINGE (ML) INJECTION AS NEEDED
Status: DISCONTINUED | OUTPATIENT
Start: 2017-06-05 | End: 2017-06-07 | Stop reason: HOSPADM

## 2017-06-05 RX ORDER — DIAZEPAM 5 MG/1
5 TABLET ORAL
Status: DISCONTINUED | OUTPATIENT
Start: 2017-06-05 | End: 2017-06-07 | Stop reason: HOSPADM

## 2017-06-05 RX ORDER — AMOXICILLIN 250 MG
1 CAPSULE ORAL 2 TIMES DAILY
Status: DISCONTINUED | OUTPATIENT
Start: 2017-06-05 | End: 2017-06-07 | Stop reason: HOSPADM

## 2017-06-05 RX ORDER — MIDAZOLAM HYDROCHLORIDE 1 MG/ML
INJECTION, SOLUTION INTRAMUSCULAR; INTRAVENOUS AS NEEDED
Status: DISCONTINUED | OUTPATIENT
Start: 2017-06-05 | End: 2017-06-05 | Stop reason: HOSPADM

## 2017-06-05 RX ORDER — DIPHENHYDRAMINE HYDROCHLORIDE 50 MG/ML
12.5 INJECTION, SOLUTION INTRAMUSCULAR; INTRAVENOUS AS NEEDED
Status: DISCONTINUED | OUTPATIENT
Start: 2017-06-05 | End: 2017-06-05 | Stop reason: HOSPADM

## 2017-06-05 RX ORDER — DEXAMETHASONE SODIUM PHOSPHATE 4 MG/ML
INJECTION, SOLUTION INTRA-ARTICULAR; INTRALESIONAL; INTRAMUSCULAR; INTRAVENOUS; SOFT TISSUE AS NEEDED
Status: DISCONTINUED | OUTPATIENT
Start: 2017-06-05 | End: 2017-06-05 | Stop reason: HOSPADM

## 2017-06-05 RX ORDER — CARBAMAZEPINE 300 MG/1
300 CAPSULE, EXTENDED RELEASE ORAL 2 TIMES DAILY
Status: DISCONTINUED | OUTPATIENT
Start: 2017-06-05 | End: 2017-06-07 | Stop reason: HOSPADM

## 2017-06-05 RX ORDER — HYDROMORPHONE HYDROCHLORIDE 1 MG/ML
.25-1 INJECTION, SOLUTION INTRAMUSCULAR; INTRAVENOUS; SUBCUTANEOUS
Status: DISCONTINUED | OUTPATIENT
Start: 2017-06-05 | End: 2017-06-05 | Stop reason: HOSPADM

## 2017-06-05 RX ORDER — SODIUM CHLORIDE 0.9 % (FLUSH) 0.9 %
5-10 SYRINGE (ML) INJECTION EVERY 8 HOURS
Status: DISCONTINUED | OUTPATIENT
Start: 2017-06-05 | End: 2017-06-05 | Stop reason: HOSPADM

## 2017-06-05 RX ORDER — PROPOFOL 10 MG/ML
INJECTION, EMULSION INTRAVENOUS AS NEEDED
Status: DISCONTINUED | OUTPATIENT
Start: 2017-06-05 | End: 2017-06-05 | Stop reason: HOSPADM

## 2017-06-05 RX ADMIN — HYDROMORPHONE HYDROCHLORIDE 1 MG: 2 INJECTION, SOLUTION INTRAMUSCULAR; INTRAVENOUS; SUBCUTANEOUS at 14:44

## 2017-06-05 RX ADMIN — ROCURONIUM BROMIDE 45 MG: 10 INJECTION, SOLUTION INTRAVENOUS at 14:10

## 2017-06-05 RX ADMIN — LIDOCAINE HYDROCHLORIDE 40 MG: 20 INJECTION, SOLUTION EPIDURAL; INFILTRATION; INTRACAUDAL; PERINEURAL at 13:36

## 2017-06-05 RX ADMIN — METOPROLOL TARTRATE 2 MG: 5 INJECTION INTRAVENOUS at 16:38

## 2017-06-05 RX ADMIN — CEFAZOLIN 2 G: 1 INJECTION, POWDER, FOR SOLUTION INTRAMUSCULAR; INTRAVENOUS; PARENTERAL at 13:33

## 2017-06-05 RX ADMIN — CELECOXIB 400 MG: 400 CAPSULE ORAL at 10:46

## 2017-06-05 RX ADMIN — DOCUSATE SODIUM -SENNOSIDES 1 TABLET: 50; 8.6 TABLET, COATED ORAL at 21:17

## 2017-06-05 RX ADMIN — CEFAZOLIN 2 G: 1 INJECTION, POWDER, FOR SOLUTION INTRAMUSCULAR; INTRAVENOUS; PARENTERAL at 21:17

## 2017-06-05 RX ADMIN — FENTANYL CITRATE 100 MCG: 50 INJECTION, SOLUTION INTRAMUSCULAR; INTRAVENOUS at 15:17

## 2017-06-05 RX ADMIN — METOPROLOL TARTRATE 2 MG: 5 INJECTION INTRAVENOUS at 16:45

## 2017-06-05 RX ADMIN — FENTANYL CITRATE 50 MCG: 50 INJECTION, SOLUTION INTRAMUSCULAR; INTRAVENOUS at 13:28

## 2017-06-05 RX ADMIN — HYDROMORPHONE HYDROCHLORIDE 1 MG: 1 INJECTION, SOLUTION INTRAMUSCULAR; INTRAVENOUS; SUBCUTANEOUS at 18:02

## 2017-06-05 RX ADMIN — CARBAMAZEPINE 300 MG: 300 CAPSULE, EXTENDED RELEASE ORAL at 21:36

## 2017-06-05 RX ADMIN — SODIUM CHLORIDE, SODIUM LACTATE, POTASSIUM CHLORIDE, AND CALCIUM CHLORIDE: 600; 310; 30; 20 INJECTION, SOLUTION INTRAVENOUS at 15:58

## 2017-06-05 RX ADMIN — LEVETIRACETAM 750 MG: 500 TABLET, FILM COATED ORAL at 21:17

## 2017-06-05 RX ADMIN — CLONAZEPAM 0.5 MG: 0.5 TABLET ORAL at 21:36

## 2017-06-05 RX ADMIN — HYDROMORPHONE HYDROCHLORIDE 1 MG: 1 INJECTION, SOLUTION INTRAMUSCULAR; INTRAVENOUS; SUBCUTANEOUS at 21:17

## 2017-06-05 RX ADMIN — ROCURONIUM BROMIDE 5 MG: 10 INJECTION, SOLUTION INTRAVENOUS at 13:36

## 2017-06-05 RX ADMIN — PROPOFOL 100 MCG/KG/MIN: 10 INJECTION, EMULSION INTRAVENOUS at 13:47

## 2017-06-05 RX ADMIN — LAMOTRIGINE 200 MG: 100 TABLET ORAL at 21:17

## 2017-06-05 RX ADMIN — SUCCINYLCHOLINE CHLORIDE 100 MG: 20 INJECTION INTRAMUSCULAR; INTRAVENOUS at 13:36

## 2017-06-05 RX ADMIN — HYDROCODONE BITARTRATE AND ACETAMINOPHEN 1 TABLET: 5; 325 TABLET ORAL at 21:17

## 2017-06-05 RX ADMIN — SODIUM CHLORIDE, SODIUM LACTATE, POTASSIUM CHLORIDE, AND CALCIUM CHLORIDE 125 ML/HR: 600; 310; 30; 20 INJECTION, SOLUTION INTRAVENOUS at 10:46

## 2017-06-05 RX ADMIN — SODIUM CHLORIDE, SODIUM LACTATE, POTASSIUM CHLORIDE, CALCIUM CHLORIDE: 600; 310; 30; 20 INJECTION, SOLUTION INTRAVENOUS at 13:39

## 2017-06-05 RX ADMIN — PROPOFOL 160 MG: 10 INJECTION, EMULSION INTRAVENOUS at 13:36

## 2017-06-05 RX ADMIN — MIDAZOLAM HYDROCHLORIDE 5 MG: 1 INJECTION, SOLUTION INTRAMUSCULAR; INTRAVENOUS at 13:28

## 2017-06-05 RX ADMIN — SODIUM CHLORIDE 125 ML/HR: 900 INJECTION, SOLUTION INTRAVENOUS at 18:01

## 2017-06-05 RX ADMIN — DEXAMETHASONE SODIUM PHOSPHATE 8 MG: 4 INJECTION, SOLUTION INTRA-ARTICULAR; INTRALESIONAL; INTRAMUSCULAR; INTRAVENOUS; SOFT TISSUE at 14:17

## 2017-06-05 RX ADMIN — HYDROMORPHONE HYDROCHLORIDE 1 MG: 2 INJECTION, SOLUTION INTRAMUSCULAR; INTRAVENOUS; SUBCUTANEOUS at 14:54

## 2017-06-05 RX ADMIN — ROCURONIUM BROMIDE 20 MG: 10 INJECTION, SOLUTION INTRAVENOUS at 15:57

## 2017-06-05 RX ADMIN — DEXAMETHASONE SODIUM PHOSPHATE 10 MG: 10 INJECTION, SOLUTION INTRAMUSCULAR; INTRAVENOUS at 18:05

## 2017-06-05 RX ADMIN — FENTANYL CITRATE 200 MCG: 50 INJECTION, SOLUTION INTRAMUSCULAR; INTRAVENOUS at 13:36

## 2017-06-05 RX ADMIN — ONDANSETRON 4 MG: 2 INJECTION INTRAMUSCULAR; INTRAVENOUS at 17:05

## 2017-06-05 NOTE — PROGRESS NOTES
Ortho Spine Daily Progress Note    Date of Surgery:  2017      Patient: Tracy Spence   YOB: 1965  Age: 46 y.o. SUBJECTIVE:   Day of Surgery following C5-7 ANTERIOR CERVICAL DISCECTOMY FUSION C5-7 ANTERIOR INSTRUMENTATION , PEAK CAGE, ALLOGRAFT, AUTOGRAFT    The patient was just received in the PACU. Her pain control is adequate at this time. The patient is able to move her hands and feet and has sensation in the bilateral upper and lower extremities. OBJECTIVE:     Vital Signs:    Temp (24hrs), Av.8 °F (36.6 °C), Min:97.7 °F (36.5 °C), Max:97.8 °F (36.6 °C)    Patient Vitals for the past 8 hrs:   BP Temp Pulse Resp SpO2 Height Weight   17 1744 153/83 97.8 °F (36.6 °C) 87 12 92 % - -   17 1044 146/77 97.7 °F (36.5 °C) 79 18 97 % - -   17 1014 - - - - - 5' 3\" (1.6 m) 91.7 kg (202 lb 2.6 oz)           Physical Exam:  General: A&Ox3, NAD. Respiratory: Respirations are unlabored. Abdomen: S/NT  Surgical site: C,D and I - mild drainage. Musculoskeletal: Calves are S/NT, Tejinder /intrinsics/wrist extension/biceps/triceps intact, Tejinder  dorsi/plantar flexion/EHL intact,  Tejinder DP/RP 2+  Neurological:  Tejinder UE's/LE's NVI    Laboratory Values:             No results for input(s): HGB, PLT, INR, CREA, GLU, HGBEXT, PLTEXT in the last 72 hours. No lab exists for component: INREXT  Lab Results   Component Value Date/Time    Sodium 140 2017 12:04 PM    Potassium 4.3 2017 12:04 PM    Chloride 103 2017 12:04 PM    CO2 33 2017 12:04 PM    Glucose 79 2017 12:04 PM    BUN 10 2017 12:04 PM    Creatinine 0.94 2017 12:04 PM    Calcium 9.4 2017 12:04 PM       PLAN:     S/P C5-7 ANTERIOR CERVICAL DISCECTOMY FUSION C5-7 ANTERIOR INSTRUMENTATION , PEAK CAGE, ALLOGRAFT, AUTOGRAFT    Seizure Disorder Mobilize with assistance tonight and tomorrow. Continue seizure meds, monitor for seizure activity.      Hemodynamics Stable, with nominal blood loss. Will continue to monitor. Wound Continue dressing changes PRN. Post Operative Pain Pain Control: PO L[place], Dilaudid IV if needed. DVT Prophylaxis Continue with SCD'S, Ankle Pump Exercises, Mobilize. Discharge Disposition Discharge plan: Will focus on pain control and mobilizing with nursing. Plan on discharge home tomorrow if doing well. Will continue to follow progress closely.         Signed By: Hamlet Poe PA-C  June 5, 2017 5:48 PM

## 2017-06-05 NOTE — ANESTHESIA POSTPROCEDURE EVALUATION
Post-Anesthesia Evaluation and Assessment    Patient: Jerod Schrader MRN: [id number]  SSN: [id number]    YOB: 1965  Age: 46 y.o. Sex: female       Cardiovascular Function/Vital Signs  Visit Vitals    /82 (BP 1 Location: Right arm, BP Patient Position: At rest)    Pulse 73    Temp 36.6 °C (97.9 °F)    Resp 13    Ht 5' 3\" (1.6 m)    Wt 91.7 kg (202 lb 2.6 oz)    SpO2 97%    BMI 35.81 kg/m2       Patient is status post general anesthesia for Procedure(s):  C5-7 ANTERIOR CERVICAL DISCECTOMY FUSION C5-7 ANTERIOR INSTRUMENTATION , PEAK CAGE, ALLOGRAFT, AUTOGRAFT. Nausea/Vomiting: None    Postoperative hydration reviewed and adequate. Pain:  Pain Scale 1: Numeric (0 - 10) (06/05/17 1824)  Pain Intensity 1: 6 (06/05/17 1824)   Managed    Neurological Status:   Neuro (WDL): Exceptions to WDL (06/05/17 1751)  Neuro  Neurologic State: Drowsy (06/05/17 1751)  LUE Motor Response: Purposeful (06/05/17 1751)  LLE Motor Response: Purposeful (06/05/17 1751)  RUE Motor Response: Purposeful (06/05/17 1751)  RLE Motor Response: Purposeful (06/05/17 1751)   At baseline    Mental Status and Level of Consciousness: Arousable    Pulmonary Status:   O2 Device: Nasal cannula (06/05/17 1751)   Adequate oxygenation and airway patent    Complications related to anesthesia: None    Post-anesthesia assessment completed.  No concerns    Signed By: Lisbet Robbins MD     June 5, 2017

## 2017-06-05 NOTE — IP AVS SNAPSHOT
[place]  
 
 
 [address] [facility] 
[phone] Patient: Manohar Zabala MRN: [id number] :1965 You are allergic to the following Allergen Reactions Aspirin Other (comments) Chest pain. Has taken ibuprofen ,diclofenac before Codeine Other (comments) Chest pain Phenobarbital Other (comments)  
 hallucinate Recent Documentation Height Weight Breastfeeding? BMI OB Status Smoking Status 1.6 m 91.7 kg No 35.81 kg/m2 Menopause Never Smoker Emergency Contacts Name Discharge Info Relation Home Work Mobile Odilia Hernandez  Child [2] [phone] JulianoJayson DISCHARGE CAREGIVER [3] Friend [5]   [phone] About your hospitalization You were admitted on:  2017 You last received care in the:  [facility] 4M POST SURG ORT 1 You were discharged on:  2017 Unit phone number:  [phone] Why you were hospitalized Your primary diagnosis was:  Not on File Your diagnoses also included:  Cervical Stenosis Of Spine Providers Seen During Your Hospitalizations Provider Role Specialty Primary office phone Molly Palmer MD Attending Provider Orthopedic Surgery [phone] Your Primary Care Physician (PCP) Primary Care Physician Office Phone Office Fax Glenis Bañuelos N [phone] ** None ** Follow-up Information Follow up With Details Comments Contact Info Jayshree Cortez NP   [address] [facility] [address] [address] [facility] [address] 
[phone] [address] [place] [address], [address] [facility] [address] [employer]   [address] [facility] [address] [facility] [address] [phone] Current Discharge Medication List  
  
START taking these medications Dose & Instructions Dispensing Information Comments Morning Noon Evening Bedtime Calcium Citrate-Vitamin D3 500 mg calcium -400 unit Chew Your last dose was: Your next dose is: Take 1 tab twice daily to enhance dung fusion Quantity:  60 Tab Refills:  2 HYDROcodone-acetaminophen 7.5-325 mg per tablet Commonly known as:  Frank Irais Replaces:  HYDROcodone-acetaminophen 5-325 mg per tablet Your last dose was: Your next dose is:    
   
   
 Dose:  1 Tab Take 1 Tab by mouth every four (4) hours as needed. Max Daily Amount: 6 Tabs. Quantity:  100 Tab Refills:  0  
     
   
   
   
  
 senna-docusate 8.6-50 mg per tablet Commonly known as:  Claudio Estes Your last dose was: Your next dose is:    
   
   
 Dose:  1 Tab Take 1 Tab by mouth two (2) times a day. Quantity:  60 Tab Refills:  1 CONTINUE these medications which have NOT CHANGED Dose & Instructions Dispensing Information Comments Morning Noon Evening Bedtime  
 carBAMazepine  mg capsule Commonly known as:  CARBATROL ER Your last dose was: Your next dose is:    
   
   
 Dose:  300 mg Take 300 mg by mouth two (2) times a day. Refills:  0  
     
   
   
   
  
 cholecalciferol (VITAMIN D3) 5,000 unit Tab tablet Commonly known as:  VITAMIN D3 Your last dose was: Your next dose is:    
   
   
 Dose:  5000 Units Take 5,000 Units by mouth daily. Refills:  0  
     
   
   
   
  
 clonazePAM 0.5 mg tablet Commonly known as:  Estefani Loge Your last dose was: Your next dose is:    
   
   
 Dose:  0.5 mg Take 0.5 mg by mouth three (3) times daily. Refills:  0  
     
   
   
   
  
 lamoTRIgine 200 mg tablet Commonly known as: LaMICtal  
   
Your last dose was: Your next dose is:    
   
   
 Dose:  200 mg Take 200 mg by mouth two (2) times a day. Refills:  0 levETIRAcetam 750 mg tablet Commonly known as:  KEPPRA Your last dose was: Your next dose is:    
   
   
 Dose:  750 mg Take 750 mg by mouth two (2) times a day. Refills:  0 STOP taking these medications HYDROcodone-acetaminophen 5-325 mg per tablet Commonly known as:  Kallie Schlichter Replaced by:  HYDROcodone-acetaminophen 7.5-325 mg per tablet Where to Get Your Medications Information on where to get these meds will be given to you by the nurse or doctor. ! Ask your nurse or doctor about these medications Calcium Citrate-Vitamin D3 500 mg calcium -400 unit Chew HYDROcodone-acetaminophen 7.5-325 mg per tablet  
 senna-docusate 8.6-50 mg per tablet Discharge Instructions Ronald Cho M.D. [address] [facility] Office Phone: [phone] Neck Surgery Discharge Instructions Activities ? You are going home a well person, be as active as possible. Your only exercise should be walking. Start with short frequent walks and increase your walking distance each day. Start with walking twice a day for 5 minutes and increase your distance each day 2-3 minutes until you reach 25 minutes twice a day. Limit the amount of time you sit to 20-30 minute intervals. Sitting for prolonged periods of time will be uncomfortable for you following your surgery. ? Do not lift anything over five pounds, and do not do any bending or straining. ? Avoid reaching overhead for 2-3 weeks ? Do not do any neck exercises until you have been instructed by your doctor. ? When you are in the bed, you may lay on your back or on either side. Do not lay on your stomach. ? Continue using your incentive spirometer regularly for deep breathing exercises ? You may resume sexual relations 2 weeks after your surgery Diet ? You may resume your normal diet.   If your throat is sore, you may want to eat soft foods for a few days. Be sure to drink plenty of fluids, it is important to keep yourself hydrated. If you begin having trouble swallowing, call the office immediately. ? Avoid alcoholic beverages and ABSOLUTELY NO tobacco products. Tobacco products will interfere with your healing. If you continue to use tobacco, you may end up needing another surgery in the future. Medications ? Do not take anti-inflammatory medications or aspirin unless instructed by your physician. ? Take your pain medication as directed. ? Do NOT take additional Tylenol if your prescribed pain medication has acetaminophen in it (Endocet/Percocet, Lortab, Norco). ? It is important to have regular bowel movements. Pain medications may cause constipation. Stool softeners, prune juice, and increasing your water and fiber intake may help in preventing constipation. ? Do NOT take laxatives if at all possible except in severe situations. It can results in a vicious cycle of constipation and diarrhea. ? Do not be alarmed if you still have some of the same symptoms you had prior to surgery. The nerves often require time to heal after the pressure has been relieved. You may experience pain in your shoulders or between your shoulder blades, which is common after this surgery. The level of pain you experience should improve as your body heals. Driving ? You may not drive or return to work until instructed by your physician. However, you may ride in the car for doctors appointments ONLY. Neck collar ? You are required to wear your cervical collar at all times. You may remove the collar long enough to change the pads when needed and to change your dressing each day. ? Do not bend or twist your neck when your collar is removed. It is best to have someone assist you when changing the pads and dressing to prevent you from bending your neck. Showering ? You may shower in approximately 4 days after your surgery if your incision is not draining. ? Leave the dressing on during your shower but avoid getting the dressing wet. ? Do not use tub baths, swimming pools or Jacuzzis. ? Neck collars may need to be worn even while in the shower. If your collar is removed for showering, be sure not to turn your neck while the collar is off. Also, make sure you put dry pads on your collar after your shower. Caring for your incision ? Leave the dressing on x 7 days after surgery. At that point, you may remove the dressing and keep the incision open to air. ? You will probably have steri-strips on your incision (small, white pieces of tape). Do not pull the steri-strips; they will fall off on their own after several days. If you have sutures or staples, they will be removed when you see your physician. ? Do not rub or apply any lotions or ointments to your incision site. Follow Up 
? Once you are home, call your physicians office to schedule an appointment for your two-week postoperative visit. Notify your physician if you develop any of the following conditions: 
? Fever above 101 degrees for 24 hours. ? Nausea or vomiting. ? Severe headache. 
? Inability to urinate. ? Loss of bowel or bladder control (sudden onset of incontinence). ? Changes in sensation in your extremities (numbness, tingling, loss of color). ? Severe pain or pain not relieved by medications. ? Redness, swelling, or drainage from your incision. ? Persistent pain in the chest.  
? Pain in the calf of either leg. 
? Increased weakness (if this is greater than before your surgery). Discharge Orders None [facility] Announcement We are excited to announce that we are making your provider's discharge notes available to you in [employer].   You will see these notes when they are completed and signed by the physician that discharged you from your recent hospital stay. If you have any questions or concerns about any information you see in [employer], please call the Health Information Department where you were seen or reach out to your Primary Care Provider for more information about your plan of care. Introducing [facility]! Dear Cassia Jensen: Thank you for requesting a [employer] account. Our records indicate that you already have an active [employer] account. You can access your account anytime at [email or web address] Did you know that you can access your hospital and ER discharge instructions at any time in [employer]? You can also review all of your test results from your hospital stay or ER visit. Additional Information If you have questions, please visit the Frequently Asked Questions section of the [employer] website at [email or web address] Remember, [employer] is NOT to be used for urgent needs. For medical emergencies, dial 911. Now available from your iPhone and Android! General Information Please provide this summary of care documentation to your next provider. Patient Signature:  ____________________________________________________________ Date:  ____________________________________________________________  
  
Amos Serrano Provider Signature:  ____________________________________________________________ Date:  ____________________________________________________________

## 2017-06-05 NOTE — PROGRESS NOTES
Primary Nurse Ana Thomas RN and Deanne Patterson RN performed a dual skin assessment on this patient Impairment noted- see wound doc flow sheet  Kalia score is 21    Bedside and Verbal shift change report given to Latia Dey RN (oncoming nurse) by AGUSTIN Ho (offgoing nurse). Report included the following information SBAR, Kardex, Procedure Summary and Intake/Output.

## 2017-06-05 NOTE — PERIOP NOTES
TRANSFER - OUT REPORT:    Verbal report given to Harinder Rodriguez RN (name) on Erica Rothcolo  being transferred to [address] [place] [address] (unit) for routine post - op       Report consisted of patients Situation, Background, Assessment and   Recommendations(SBAR). Information from the following report(s) SBAR and MAR was reviewed with the receiving nurse. Opportunity for questions and clarification was provided.       Patient transported with:   O2 @ 2 liters  Registered Nurse

## 2017-06-05 NOTE — ANESTHESIA PREPROCEDURE EVALUATION
Anesthetic History   No history of anesthetic complications            Review of Systems / Medical History  Patient summary reviewed, nursing notes reviewed and pertinent labs reviewed    Pulmonary  Within defined limits                 Neuro/Psych     seizures (hasnt had a grand mal in 4 years but has daily petite mal seizures)    Psychiatric history     Cardiovascular  Within defined limits                Exercise tolerance: >4 METS  Comments: Not on beta blocker   GI/Hepatic/Renal  Within defined limits              Endo/Other  Within defined limits           Other Findings            Physical Exam    Airway  Mallampati: I  TM Distance: 4 - 6 cm  Neck ROM: normal range of motion   Mouth opening: Normal     Cardiovascular  Regular rate and rhythm,  S1 and S2 normal,  no murmur, click, rub, or gallop  Rhythm: regular  Rate: normal         Dental  No notable dental hx       Pulmonary  Breath sounds clear to auscultation               Abdominal  GI exam deferred       Other Findings            Anesthetic Plan    ASA: 2  Anesthesia type: general          Induction: Intravenous  Anesthetic plan and risks discussed with: Patient

## 2017-06-05 NOTE — IP AVS SNAPSHOT
Current Discharge Medication List  
  
START taking these medications Dose & Instructions Dispensing Information Comments Morning Noon Evening Bedtime Calcium Citrate-Vitamin D3 500 mg calcium -400 unit Chew Your last dose was: Your next dose is: Take 1 tab twice daily to enhance dung fusion Quantity:  60 Tab Refills:  2 HYDROcodone-acetaminophen 7.5-325 mg per tablet Commonly known as:  Michelle Navarro Replaces:  HYDROcodone-acetaminophen 5-325 mg per tablet Your last dose was: Your next dose is:    
   
   
 Dose:  1 Tab Take 1 Tab by mouth every four (4) hours as needed. Max Daily Amount: 6 Tabs. Quantity:  100 Tab Refills:  0  
     
   
   
   
  
 senna-docusate 8.6-50 mg per tablet Commonly known as:  Lety Alas Your last dose was: Your next dose is:    
   
   
 Dose:  1 Tab Take 1 Tab by mouth two (2) times a day. Quantity:  60 Tab Refills:  1 CONTINUE these medications which have NOT CHANGED Dose & Instructions Dispensing Information Comments Morning Noon Evening Bedtime  
 carBAMazepine  mg capsule Commonly known as:  CARBATROL ER Your last dose was: Your next dose is:    
   
   
 Dose:  300 mg Take 300 mg by mouth two (2) times a day. Refills:  0  
     
   
   
   
  
 cholecalciferol (VITAMIN D3) 5,000 unit Tab tablet Commonly known as:  VITAMIN D3 Your last dose was: Your next dose is:    
   
   
 Dose:  5000 Units Take 5,000 Units by mouth daily. Refills:  0  
     
   
   
   
  
 clonazePAM 0.5 mg tablet Commonly known as:  Darnella Sermons Your last dose was: Your next dose is:    
   
   
 Dose:  0.5 mg Take 0.5 mg by mouth three (3) times daily. Refills:  0  
     
   
   
   
  
 lamoTRIgine 200 mg tablet Commonly known as: LaMICtal  
   
Your last dose was: Your next dose is:    
   
   
 Dose:  200 mg Take 200 mg by mouth two (2) times a day. Refills:  0  
     
   
   
   
  
 levETIRAcetam 750 mg tablet Commonly known as:  KEPPRA Your last dose was: Your next dose is:    
   
   
 Dose:  750 mg Take 750 mg by mouth two (2) times a day. Refills:  0 STOP taking these medications HYDROcodone-acetaminophen 5-325 mg per tablet Commonly known as:  Luis Hernesto Replaced by:  HYDROcodone-acetaminophen 7.5-325 mg per tablet Where to Get Your Medications Information on where to get these meds will be given to you by the nurse or doctor. ! Ask your nurse or doctor about these medications Calcium Citrate-Vitamin D3 500 mg calcium -400 unit Chew HYDROcodone-acetaminophen 7.5-325 mg per tablet  
 senna-docusate 8.6-50 mg per tablet

## 2017-06-05 NOTE — BRIEF OP NOTE
BRIEF OPERATIVE NOTE    Date of Procedure: 6/5/2017   Preoperative Diagnosis: CERVICAL STENOSIS, CERVICAL DISC HERNIATION   Postoperative Diagnosis: CERVICAL STENOSIS, CERVICAL DISC HERNIATION     Procedure(s):  C5-7 ANTERIOR CERVICAL DISCECTOMY FUSION C5-7 ANTERIOR INSTRUMENTATION , ALLOGRAFT,   Surgeon(s) and Role:     * Jocelin Jacques MD - Primary         Assistant Staff:  Physician Assistant: Romayne Grise, PA    Surgical Staff:  Circ-1: Josephine Mina RN  Physician Assistant: Romayne Grise, PA  Scrub Tech-1: Ming Parr  Surg Asst-1: Go Mcwilliams Staff: Rossana Stanford RN  Event Time In   Incision Start 1429   Incision Close      Anesthesia: General   Estimated Blood Loss: minimal  Specimens: none  Findings: C5-6 and C6-7 stenosis, acute C6-7 HNP, OPLL at P7-4  Complications: none  Implants:   Implant Name Type Inv.  Item Serial No.  Lot No. LRB No. Used Action   SPACER CERV LORDTC [id number] -- [employer] - [id number]  SPACER CERV LORDTC [id number] -- [employer] [place] [id number] [employer] [id number] N/A 1 Implanted   [employer] Montage  Other  N/A  [id number] N/A 1 Implanted   seaspine cervical spacer 15mm x 6mm Graft  [id number]  [id number] N/A 1 Implanted   PLATE BNE CERV 2-LVL 36MM -- PYRENEES - SN/A  PLATE BNE CERV 2-LVL 36MM -- PYRENEES N/A [employer] N/A N/A 1 Implanted   SCR SPNE CERV STATIC 4X14MM -- PYRENEES - SN/A   SCR SPNE CERV STATIC 4X14MM -- [employer]YRENEES N/A [employer] N/A N/A 6 Implanted

## 2017-06-06 PROCEDURE — 99218 HC RM OBSERVATION: CPT

## 2017-06-06 PROCEDURE — 97530 THERAPEUTIC ACTIVITIES: CPT

## 2017-06-06 PROCEDURE — 77010033678 HC OXYGEN DAILY

## 2017-06-06 PROCEDURE — 74011250636 HC RX REV CODE- 250/636: Performed by: PHYSICIAN ASSISTANT

## 2017-06-06 PROCEDURE — 74011250637 HC RX REV CODE- 250/637: Performed by: PHYSICIAN ASSISTANT

## 2017-06-06 PROCEDURE — 97116 GAIT TRAINING THERAPY: CPT

## 2017-06-06 PROCEDURE — 97161 PT EVAL LOW COMPLEX 20 MIN: CPT

## 2017-06-06 PROCEDURE — 65270000029 HC RM PRIVATE

## 2017-06-06 RX ORDER — AMOXICILLIN 250 MG
1 CAPSULE ORAL 2 TIMES DAILY
Qty: 60 TAB | Refills: 1 | Status: ON HOLD | OUTPATIENT
Start: 2017-06-06 | End: 2017-06-12

## 2017-06-06 RX ORDER — HYDROCODONE BITARTRATE AND ACETAMINOPHEN 7.5; 325 MG/1; MG/1
1 TABLET ORAL
Qty: 100 TAB | Refills: 0 | Status: SHIPPED | OUTPATIENT
Start: 2017-06-06 | End: 2017-06-13

## 2017-06-06 RX ORDER — LEVETIRACETAM 250 MG/1
750 TABLET ORAL 2 TIMES DAILY
Status: DISCONTINUED | OUTPATIENT
Start: 2017-06-06 | End: 2017-06-07 | Stop reason: HOSPADM

## 2017-06-06 RX ADMIN — POLYETHYLENE GLYCOL 3350 17 G: 17 POWDER, FOR SOLUTION ORAL at 09:32

## 2017-06-06 RX ADMIN — CEFAZOLIN 2 G: 1 INJECTION, POWDER, FOR SOLUTION INTRAMUSCULAR; INTRAVENOUS; PARENTERAL at 12:47

## 2017-06-06 RX ADMIN — HYDROCODONE BITARTRATE AND ACETAMINOPHEN 1 TABLET: 7.5; 325 TABLET ORAL at 02:15

## 2017-06-06 RX ADMIN — CLONAZEPAM 0.5 MG: 0.5 TABLET ORAL at 09:32

## 2017-06-06 RX ADMIN — Medication 1 SPRAY: at 12:47

## 2017-06-06 RX ADMIN — HYDROCODONE BITARTRATE AND ACETAMINOPHEN 1 TABLET: 7.5; 325 TABLET ORAL at 21:17

## 2017-06-06 RX ADMIN — DOCUSATE SODIUM -SENNOSIDES 1 TABLET: 50; 8.6 TABLET, COATED ORAL at 09:32

## 2017-06-06 RX ADMIN — LEVETIRACETAM 750 MG: 250 TABLET, FILM COATED ORAL at 21:17

## 2017-06-06 RX ADMIN — Medication 1 SPRAY: at 09:59

## 2017-06-06 RX ADMIN — CLONAZEPAM 0.5 MG: 0.5 TABLET ORAL at 21:17

## 2017-06-06 RX ADMIN — LAMOTRIGINE 200 MG: 100 TABLET ORAL at 09:32

## 2017-06-06 RX ADMIN — LAMOTRIGINE 200 MG: 100 TABLET ORAL at 21:17

## 2017-06-06 RX ADMIN — Medication 10 ML: at 05:44

## 2017-06-06 RX ADMIN — LEVETIRACETAM 750 MG: 500 TABLET, FILM COATED ORAL at 09:32

## 2017-06-06 RX ADMIN — CARBAMAZEPINE 300 MG: 300 CAPSULE, EXTENDED RELEASE ORAL at 09:39

## 2017-06-06 RX ADMIN — VITAMIN D, TAB 1000IU (100/BT) 5000 UNITS: 25 TAB at 09:32

## 2017-06-06 RX ADMIN — HYDROCODONE BITARTRATE AND ACETAMINOPHEN 1 TABLET: 10; 325 TABLET ORAL at 09:39

## 2017-06-06 RX ADMIN — CLONAZEPAM 0.5 MG: 0.5 TABLET ORAL at 15:43

## 2017-06-06 RX ADMIN — DOCUSATE SODIUM -SENNOSIDES 1 TABLET: 50; 8.6 TABLET, COATED ORAL at 17:53

## 2017-06-06 RX ADMIN — DIAZEPAM 5 MG: 5 TABLET ORAL at 14:46

## 2017-06-06 RX ADMIN — Medication 10 ML: at 21:17

## 2017-06-06 RX ADMIN — HYDROCODONE BITARTRATE AND ACETAMINOPHEN 1 TABLET: 7.5; 325 TABLET ORAL at 05:43

## 2017-06-06 RX ADMIN — SODIUM CHLORIDE 125 ML/HR: 900 INJECTION, SOLUTION INTRAVENOUS at 02:15

## 2017-06-06 RX ADMIN — DEXAMETHASONE SODIUM PHOSPHATE 10 MG: 10 INJECTION, SOLUTION INTRAMUSCULAR; INTRAVENOUS at 02:15

## 2017-06-06 RX ADMIN — CARBAMAZEPINE 300 MG: 300 CAPSULE, EXTENDED RELEASE ORAL at 21:26

## 2017-06-06 RX ADMIN — CEFAZOLIN 2 G: 1 INJECTION, POWDER, FOR SOLUTION INTRAMUSCULAR; INTRAVENOUS; PARENTERAL at 05:43

## 2017-06-06 NOTE — PROGRESS NOTES
Problem: Mobility Impaired (Adult and Pediatric)  Goal: *Acute Goals and Plan of Care (Insert Text)  Physical Therapy Goals  Initiated 6/6/2017    1. Patient will move from supine to sit and sit to supine in bed with independence within 4 days. 2. Patient will perform sit to stand with modified independence within 4 days. 3. Patient will ambulate with modified independence for 150 feet with the least restrictive device within 4 days. 4. Patient will ascend/descend 4 stairs with 1 handrail(s) with supervision/set-up within 4 days. 5. Patient will verbalize and demonstrate understanding of spinal precautions (No bending, lifting greater than 5 lbs, or twisting; log-roll technique; frequent repositioning as instructed) within 4 days. PHYSICAL THERAPY EVALUATION  Patient: Joslyn Jeong (16 y.o. female)  Date: 6/6/2017  Primary Diagnosis: CERVICAL STENOSIS, CERVICAL DISC HERNIATION   Cervical stenosis of spine  Procedure(s) (LRB):  C5-7 ANTERIOR CERVICAL DISCECTOMY FUSION C5-7 ANTERIOR INSTRUMENTATION , PEAK CAGE, ALLOGRAFT, AUTOGRAFT (N/A) 1 Day Post-Op   Precautions: Aspen collar;   Spinal, Fall, seizure      ASSESSMENT :  Based on the objective data described below, the patient presents with generalized weakness, decreased dynamic balance, dizzy when up, unsteady gait and high risk for falls following admission for cervical discectomy and anterior instrumentation. Patient was educated regarding log roll technique and instructed in BLT spinal precautions and she expressed understanding. Patient was able to stand and ambulate 50 feet with rolling walker and minimal assist. She demonstrates a wide based gait and path deviations. Patient is more unsteady without the use of the walker and needs firm hand hold assist. BP dropped when sitting up but resolved with time. She is not yet safe for discharge. Patient states she is usually steady on her feet but did fall about 2 weeks ago.  She has seizure history but has not had grand mal seizure in 3 years. She lives with her fiance and a grandson. She has no DME. She will benefit from having a rolling walker and is requesting a bedside commode and shower chair as well. She will need HHPT upon discharge as well. Anticipate being able to clear her tomorrow . Patient will benefit from skilled intervention to address the above impairments. Patients rehabilitation potential is considered to be Good  Factors which may influence rehabilitation potential include:   [ ]         None noted  [ ]         Mental ability/status  [X]         Medical condition  [ ]         Home/family situation and support systems  [ ]         Safety awareness  [ ]         Pain tolerance/management  [ ]         Other:        PLAN :  Recommendations and Planned Interventions:  [X]           Bed Mobility Training             [ ]    Neuromuscular Re-Education  [X]           Transfer Training                   [ ]    Orthotic/Prosthetic Training  [X]           Gait Training                         [ ]    Modalities  [ ]           Therapeutic Exercises           [ ]    Edema Management/Control  [ ]           Therapeutic Activities            [ ]    Patient and Family Training/Education  [ ]           Other (comment):     Frequency/Duration: Patient will be followed by physical therapy  twice daily to address goals.   Discharge Recommendations: Home Health  Further Equipment Recommendations for Discharge: bedside commode, shower chair and rolling walker       SUBJECTIVE:   Patient stated I don't usually walk like this; I don't know what is going on.      OBJECTIVE DATA SUMMARY:   HISTORY:    Past Medical History:   Diagnosis Date    Degenerative disc disease, cervical       C2 C3    Headache      Ill-defined condition       anxiety    Seizures ([address]       daily petite mal seizure    UTI (urinary tract infection) 05/22/2017     Past Surgical History:   Procedure Laterality Date    HX ORTHOPAEDIC   2014 right knee     Prior Level of Function/Home Situation: Independent  Personal factors and/or comorbidities impacting plan of care: unsteady gait; high fall risk     Home Situation  Home Environment: Private residence  # Steps to Enter: 6  Rails to Enter: Yes  One/Two Story Residence: Two story  # of Interior Steps: 12  Height of Each Step (in): 0 inches  Interior Rails: Right  Lift Chair Available: No  Living Alone: No  Support Systems: Family member(s)  Patient Expects to be Discharged to[de-identified] Private residence  Current DME Used/Available at Home: None     EXAMINATION/PRESENTATION/DECISION MAKING:   Critical Behavior:  Neurologic State: Alert, Appropriate for age  Orientation Level: Oriented X4  Cognition: Appropriate for age attention/concentration, Follows commands  Safety/Judgement: Insight into deficits, Good awareness of safety precautions  Hearing: Auditory  Auditory Impairment: None  Skin:  Not fully observed     Range Of Motion:  AROM: Generally decreased, functional (BLE)                       Strength:    Strength: Generally decreased, functional (BLE)                    Tone & Sensation:   Tone: Normal              Sensation: Intact (denies numbness or tingling)               Coordination:  Coordination: Generally decreased, functional        Functional Mobility:  Bed Mobility:  Rolling: Additional time;Contact guard assistance  Supine to Sit: Minimum assistance  Sit to Supine: Contact guard assistance     Transfers:  Sit to Stand: Additional time;Minimum assistance  Stand to Sit: Contact guard assistance                       Balance:   Sitting: Intact; High guard  Standing: Impaired  Standing - Static: Fair  Standing - Dynamic : Fair  Ambulation/Gait Training:  Distance (ft): 50 Feet (ft)  Assistive Device: Gait belt;Walker, rolling;Brace/Splint  Ambulation - Level of Assistance: Minimal assistance        Gait Abnormalities: Decreased step clearance; Path deviations        Base of Support: Widened Speed/Kimber: Pace decreased (<100 feet/min)  Step Length: Right shortened;Left shortened                                                             Functional Measure:  Tinetti test:      Sitting Balance: 1  Arises: 1  Attempts to Rise: 2  Immediate Standing Balance: 1  Standing Balance: 1  Nudged: 1  Eyes Closed: 0  Turn 360 Degrees - Continuous/Discontinuous: 0  Turn 360 Degrees - Steady/Unsteady: 0  Sitting Down: 1  Balance Score: 8  Indication of Gait: 1  R Step Length/Height: 1  L Step Length/Height: 1  R Foot Clearance: 1  L Foot Clearance: 1  Step Symmetry: 1  Step Continuity: 1  Path: 1  Trunk: 1  Walking Time: 0  Gait Score: 9  Total Score: 17         Tinetti Test and G-code impairment scale:  Percentage of Impairment CH     0%    CI     1-19% CJ     20-39% CK     40-59% CL     60-79% CM     80-99% CN      100%   Tinetti  Score 0-28 28 23-27 17-22 12-16 6-11 1-5 0          Tinetti Tool Score Risk of Falls  <19 = High Fall Risk  19-24 = Moderate Fall Risk  25-28 = Low Fall Risk  Tinetti ME. Performance-Oriented Assessment of Mobility Problems in Elderly Patients. [address] 66; [id number].  (Scoring Description: PT Bulletin Feb. 10, 1993)     Older adults: Lella Brittle et al, 2009; n = 1000 [facility] elderly evaluated with ABC, RIKA, ADL, and IADL)  · Mean RIKA score for males aged 69-68 years = 26.21(3.40)  · Mean RIKA score for females age 69-68 years = 25.16(4.30)  · Mean RIKA score for males over 80 years = 23.29(6.02)  · Mean RIKA score for females over 80 years = 17.20(8.32)               Physical Therapy Evaluation Charge Determination   History Examination Presentation Decision-Making   MEDIUM  Complexity : 1-2 comorbidities / personal factors will impact the outcome/ POC  LOW Complexity : 1-2 Standardized tests and measures addressing body structure, function, activity limitation and / or participation in recreation  MEDIUM Complexity : Evolving with changing characteristics  LOW Complexity : FOTO score of       Based on the above components, the patient evaluation is determined to be of the following complexity level: LOW      Pain:  Pain Scale 1: Numeric (0 - 10)  Pain Intensity 1: 3  Pain Location 1: Neck; Throat  Pain Orientation 1: Anterior     Activity Tolerance:   Fatigues easily  Please refer to the flowsheet for vital signs taken during this treatment. After treatment:   [ ]         Patient left in no apparent distress sitting up in chair  [X]         Patient left in no apparent distress in bed  [X]         Call bell left within reach  [X]         Nursing notified  [ ]         Caregiver present  [X]         Bed alarm activated      COMMUNICATION/EDUCATION:   The patients plan of care was discussed with: Registered Nurse.  [X]         Fall prevention education was provided and the patient/caregiver indicated understanding. [ ]         Patient/family have participated as able in goal setting and plan of care. [X]         Patient/family agree to work toward stated goals and plan of care. [ ]         Patient understands intent and goals of therapy, but is neutral about his/her participation. [ ]         Patient is unable to participate in goal setting and plan of care.      Thank you for this referral.  Angelique Traore, PT   Time Calculation: 25 mins

## 2017-06-06 NOTE — PROGRESS NOTES
6/6/2017 5:28 PM Home health order received and sent to [facility] via [address]. 6/6/2017 4:52 PM Pt's BSC and RW will be delivered on 6/7. Shower chair is not covered, pt is aware and will purchase one on her own. 6/6/2017  4:06 PM Case management consult for shower chair, RW, and bsc received. EMR reviewed, home health PT recommendations noted. Met with pt. Charted address and phone number confirmed. Pt will have her family at home after discharge to assist if needed. Pt has rx coverage and fills her scripts at [facility].   DME discussed, pt selected [facility] for needs. Referral sent via [employer]. HH discussed, pt selected [facility] for preference. Referral sent to [facility] via [employer]. Will need home health order prior to discharge. CM will follow. PER Corea      Care Management Interventions  PCP Verified by CM: Yes Marshal Cotto )  Mode of Transport at Discharge: Other (see comment) (Pt's fiance )  MyChart Signup: No  Discharge Durable Medical Equipment: Yes  Physical Therapy Consult: Yes  Speech Therapy Consult: No  Current Support Network: Own Home, Lives with Spouse  Confirm Follow Up Transport: Family      6/6/2017  12:10 PM Attempted to meet with pt. Pt unavailable, with nurse. EMR reviewed. No discharge needs identified. CM will follow. PER Corea     .

## 2017-06-06 NOTE — DISCHARGE SUMMARY
[address] [facility]   [address]. [place],  [address] [facility]    DISCHARGE SUMMARY     Patient: Leia Soliz                             Medical Record Number: [id number]                : 1965  Age: 46 y.o. Admit Date: 2017  Discharge Date: 2017  Admission Diagnosis: CERVICAL STENOSIS, CERVICAL DISC HERNIATION   Cervical stenosis of spine  Discharge Diagnosis: CERVICAL STENOSIS, CERVICAL DISC HERNIATION   Procedures: Procedure(s):  C5-7 ANTERIOR CERVICAL DISCECTOMY FUSION C5-7 ANTERIOR INSTRUMENTATION , PEAK CAGE, ALLOGRAFT, AUTOGRAFT  Surgeon: Berenice Wilson MD  Assist: Hiram Galeas PA-C  Anesthesia: general  Complications: None       Hospital Course:  Leia Soliz was admitted the same day of surgery and underwent Procedure(s):  C5-7 ANTERIOR CERVICAL DISCECTOMY FUSION C5-7 ANTERIOR INSTRUMENTATION , PEAK CAGE, ALLOGRAFT, AUTOGRAFT and tolerated the procedure well. She was transferred  to the recovery room in stable condition. After a brief stay the patient was then transferred to the Joint Replacement Unit at [facility].  On postoperative day #1, the dressing was clean and dry, she was neurovascularly intact. The patient was afebrile and vital signs were stable. Calves were soft and non-tender bilaterally. She was unsteady when ambulating so her discharge was held until POD #2. On postoperative day  # 2, the patient was tolerating a regular diet and making satisfactory progress with physical therapy. Hemoglobin and INR prior to discharge were     Lab Results   Component Value Date/Time    HGB 13.1 2017 12:04 PM    INR 1.0 2017 12:04 PM       Leia Soliz made satisfactory progress with physical therapy and was discharged to Home in stable condition on postoperative day 1.   She was provided with routine postoperative instructions and advised to follow up in my office in 3 weeks following discharge from the hospital.  She was prescribed pain medication for post-operative analgesia. Discharge Medications:  Current Discharge Medication List      START taking these medications    Details   HYDROcodone-acetaminophen (NORCO) 7.5-325 mg per tablet Take 1 Tab by mouth every four (4) hours as needed. Max Daily Amount: 6 Tabs. Qty: 100 Tab, Refills: 0      senna-docusate (PERICOLACE) 8.6-50 mg per tablet Take 1 Tab by mouth two (2) times a day. Qty: 60 Tab, Refills: 1      Calcium Citrate-Vitamin D3 500 mg calcium -400 unit chew Take 1 tab twice daily to enhance dung fusion  Qty: 60 Tab, Refills: 2         CONTINUE these medications which have NOT CHANGED    Details   cholecalciferol, VITAMIN D3, (VITAMIN D3) 5,000 unit tab tablet Take 5,000 Units by mouth daily. carBAMazepine ER (CARBATROL ER) 300 mg capsule Take 300 mg by mouth two (2) times a day. levETIRAcetam (KEPPRA) 750 mg tablet Take 750 mg by mouth two (2) times a day. lamoTRIgine (LAMICTAL) 200 mg tablet Take 200 mg by mouth two (2) times a day. clonazePAM (KLONOPIN) 0.5 mg tablet Take 0.5 mg by mouth three (3) times daily.          STOP taking these medications       HYDROcodone-acetaminophen (NORCO) 5-325 mg per tablet Comments:   Reason for Stopping: duplicat              Signed by: WILFREDO Clayton  6/6/2017

## 2017-06-06 NOTE — ROUTINE PROCESS
Bedside and Verbal shift change report given to Anum Quick RN (oncoming nurse) by Michelle Gonzalez RN (offgoing nurse). Report included the following information SBAR, Kardex, OR Summary, Intake/Output and MAR.

## 2017-06-06 NOTE — INTERDISCIPLINARY ROUNDS
[address]    Patient Information    Name: Drew Julian  Age: 46 y.o. Admission Date: 6/5/2017  Surgery/Procedure: Procedure(s):  C5-7 ANTERIOR CERVICAL DISCECTOMY FUSION C5-7 ANTERIOR INSTRUMENTATION , PEAK CAGE, ALLOGRAFT, AUTOGRAFT  Attending Provider: Jocelin Jacques MD  Surgeon: Mariam Drew   Problem List: Active Problems:    Cervical stenosis of spine (6/5/2017)      During rounds the following quality care indicators and evidence based practices were addressed :       PT/OT: Patient mobility - just ordered                       Pain Assessment  Pain Intensity 1: 9 (06/06/17 0931)  Pain Location 1: Neck, Throat  Pain Intervention(s) 1: Medication (see MAR)  Patient Stated Pain Goal: 3    Pain meds: hydrocodone  Antibiotics completed: Yes  Anticoagulation:      SCDs: in place  Bowels:     RRAT Score:      Readmit Risk Tool  Support Systems: Family member(s)  Relationship with Primary Physician Group: Seen at least one time within the past 6 months    Discharge Management/Planning:    Readmit Risk Assessment Information:   Low Risk            11       Total Score        3 Relationship with PCP    4 More than 1 Admission in calendar year    4 Patient Insurance is Medicare, Medicaid or Self Pay        Criteria that do not apply:    Patient Living Status    Patient Length of Stay > 5    Charlson Comorbidity Score         Readmit Risk Tool  Support Systems: Family member(s)  Relationship with Primary Physician Group: Seen at least one time within the past 6 months    [facility]:     Anticipated Date of Discharge: today    Interdisciplinary team rounds were held with the following team members: Nurse Practitioner, Case Management, Nursing, Pharmacy, and Rehab. See clinical pathway and/or care plan for interventions and desired outcomes.

## 2017-06-06 NOTE — DISCHARGE INSTRUCTIONS
Nadya Rios M.D. [address] [facility] [address]  Office Phone: [phone]    Neck Surgery Discharge Instructions    Activities   You are going home a well person, be as active as possible. Your only exercise should be walking. Start with short frequent walks and increase your walking distance each day. Start with walking twice a day for 5 minutes and increase your distance each day 2-3 minutes until you reach 25 minutes twice a day. Limit the amount of time you sit to 20-30 minute intervals. Sitting for prolonged periods of time will be uncomfortable for you following your surgery.  Do not lift anything over five pounds, and do not do any bending or straining.  Avoid reaching overhead for 2-3 weeks   Do not do any neck exercises until you have been instructed by your doctor.  When you are in the bed, you may lay on your back or on either side. Do not lay on your stomach.  Continue using your incentive spirometer regularly for deep breathing exercises   You may resume sexual relations 2 weeks after your surgery    Diet   You may resume your normal diet. If your throat is sore, you may want to eat soft foods for a few days. Be sure to drink plenty of fluids, it is important to keep yourself hydrated. If you begin having trouble swallowing, call the office immediately.  Avoid alcoholic beverages and ABSOLUTELY NO tobacco products. Tobacco products will interfere with your healing. If you continue to use tobacco, you may end up needing another surgery in the future. Medications   Do not take anti-inflammatory medications or aspirin unless instructed by your physician.  Take your pain medication as directed.  Do NOT take additional Tylenol if your prescribed pain medication has acetaminophen in it (Endocet/Percocet, Lortab, Norco).  It is important to have regular bowel movements. Pain medications may cause constipation.   Stool softeners, prune juice, and increasing your water and fiber intake may help in preventing constipation.  Do NOT take laxatives if at all possible except in severe situations. It can results in a vicious cycle of constipation and diarrhea.  Do not be alarmed if you still have some of the same symptoms you had prior to surgery. The nerves often require time to heal after the pressure has been relieved. You may experience pain in your shoulders or between your shoulder blades, which is common after this surgery. The level of pain you experience should improve as your body heals. Driving   You may not drive or return to work until instructed by your physician. However, you may ride in the car for doctors appointments ONLY. Neck collar   You are required to wear your cervical collar at all times. You may remove the collar long enough to change the pads when needed and to change your dressing each day.  Do not bend or twist your neck when your collar is removed. It is best to have someone assist you when changing the pads and dressing to prevent you from bending your neck. Showering   You may shower in approximately 4 days after your surgery if your incision is not draining.  Leave the dressing on during your shower but avoid getting the dressing wet.  Do not use tub baths, swimming pools or Jacuzzis.  Neck collars may need to be worn even while in the shower. If your collar is removed for showering, be sure not to turn your neck while the collar is off. Also, make sure you put dry pads on your collar after your shower. Caring for your incision   Leave the dressing on x 7 days after surgery. At that point, you may remove the dressing and keep the incision open to air.  You will probably have steri-strips on your incision (small, white pieces of tape). Do not pull the steri-strips; they will fall off on their own after several days.   If you have sutures or staples, they will be removed when you see your physician.  Do not rub or apply any lotions or ointments to your incision site. Follow Up   Once you are home, call your physicians office to schedule an appointment for your two-week postoperative visit. Notify your physician if you develop any of the following conditions:   Fever above 101 degrees for 24 hours.  Nausea or vomiting.  Severe headache.  Inability to urinate.  Loss of bowel or bladder control (sudden onset of incontinence).  Changes in sensation in your extremities (numbness, tingling, loss of color).  Severe pain or pain not relieved by medications.  Redness, swelling, or drainage from your incision.  Persistent pain in the chest.    Pain in the calf of either leg.  Increased weakness (if this is greater than before your surgery).

## 2017-06-06 NOTE — OP NOTES
[facility]   [address] [facility], [address]   OP NOTE       Name:  Esteban Anderson   MR#:  [id number]   :  1965   Account #:  [de-identified]    Surgery Date:  2017   Date of Adm:  2017       PREOPERATIVE DIAGNOSES:   1. Cervical radiculopathy. 2. Cervical spondylosis C5-6, C6-7.   3. Cervical stenosis C5-6, C6-7. POSTOPERATIVE DIAGNOSES:   1. Cervical radiculopathy. 2. Cervical spondylosis C5-6, C6-7.   3. Cervical stenosis C5-6, C6-7.   4. 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 PROCEDURES PERFORMED:   1. C5-C6 anterior cervical diskectomy for decompression of spinal cord   and bilateral exiting nerve roots. 2. C6-C7 anterior cervical discectomy for decompression of spinal cord   and bilateral exiting nerve roots. 3. Anterior cervical interbody fusion at C5-6.   4. Anterior cervical interbody fusion, C6-7.   5. Insertion of machined bone cage, C-spine [place] size 7 mm   height, 15 mm width, 13 mm depth at C6-7.   6. Insertion of machined bone cage, C-spine [place] at C5-6, size 6   mm height, 15 mm width, 13 mm depth at C5-6.   7. Anterior cervical plating utilizing the K2M [place] plate, 36 mm,   spanning from C5 to C7 with 14 mm screws x6 all individually locked. SURGEON: Ba Pena MD    ASSISTANT: WILFREDO Cabrera    ESTIMATED BLOOD LOSS: minimal    SPECIMENS REMOVED: None. COMPLICATIONS: None. ANESTHESIA: General.    INDICATIONS FOR SURGERY: The patient is a 80-year-old right-  hand dominant female with refractory back pain radiating down her   right shoulder, arm, to the wrist with weakness, numbness and tingling. Symptoms have been persistent for almost a year. She had failed   conservative measures including therapy and medications.  Positive   Spurling maneuver on exam. She had been initially seen by my   partner, Dr. Kailyn Bran, and had been planned for surgery; however, there   were concerns due to the patient's history of epilepsy. The patient did   undergo evaluation with her neurologist. She has petite mal seizures. She has not had a grand mal seizure in over 3 years. There was no   contraindication to surgery, but also other then utilizing her regular   home medications, there is no way to predict the potential for seizure   activity. We had discussed, given the potential procedure activity,   wishing to proceed with anterior cervical plating, likely use a hard collar   for up to 12 weeks postop and that, although my typical course is to   utilize motor-evoked potentials as well as SSEPs with neurological   monitoring, but only utilize SSEPs. The patient underwent preoperative   medical evaluation and was prepared for surgery. She was noted to be   severely vitamin D deficient preop with Vitamin D level of 14, and she   was started on vitamin D supplementation preop. OPERATIVE NOTE: The patient identified in the preoperative area and anterior cervical spine   marked as the site of surgery. Risks/benefits of surgery reviewed and she wished to proceed. In the preop,   she had weakness of right triceps , wrist flexion as well as wrist extension. Sensation   diminished in C6 and C7. Diminished neck range of motion and positive spurling's maneuver on left. Once   again reviewed the risks and benefits and she wished to proceed. SCDs were applied in the preoperative area. The patient was taken to   the operating room and positioned herself on operating room table. Head was supported on a donut pillow. The   patient underwent general anesthetic by the anesthesia team. A Cotto   catheter was inserted under sterile conditions. Neurological monitoring   was utilized, but only SSEPs and no motor potentials. Baselines were   obtained. A shoulder roll was applied. Arms were gently tucked to the   side.   Copious padding over cubital and carpal tunnels, iliac crest, knees and ankles. The patient was   Confirmed with timeout and procedure confirmed by Anesthesia, OR staff, and myself. Then   brought in x-ray, identified the of level C6 on skin crease which   occurred naturally on the left side of her neck. I had difficulty   visualizing C6-7 due to the patient's body habitus but C5-6 was easily visible. The   patient received preoperative antibiotics as well as 8 mg of   Decadron. A 4 cm transverse incision on the left side of the neck. Careful   sharp dissection was taken down to the subcutaneous tissues. Platysma identified and divided inline with skin incision. Platysma flaps made cephalad and caudal. I then utilized the   standard anterior Matute-Lamar approach on the left side of the neck   Using blunt dissection. Staying medial to the carotid sheath, lateral to the   tracheoesophageal complex. Carotid tubercle was palpated as well as   anterior spondylosis was severe at C6-7 as well as the C5-6. We utilized   hand-held retractors here once down to the prevertebral fascia. The   prevertebral fascia was divided cephalad and caudal with Metzenbaum   scissors and spread with peanut elevator cephalad and caudal. We   then placed a radiopaque marker. It was felt to be C5-6. This was   confirmed on fluoroscopy. I then proceeded to elevate the longus colli   muscles as well as the uncus bilaterally utilizing Penfield 4 elevator,   bipolar cautery and handheld retractors. This was performed at C5-6   and C6-7. There was pretty tremendous anterior spondylosis at both   levels. We utilized rongeur to remove the anterior osteophytes. Preliminary diskectomy was performed with pituitary   rongeur and curettes. We then went under the microscope. Once   under the microscope, self retaining retractor was placed at C6-7. [place] pins applied. Disk space was  collapse down to only about 3 mm. There was   marked central spondylosis and central stenosis.  Also, there appeared to   be a defect at the portion of the annulus posteriorly and centrally. There   was free disk material seen centrally compressing cord. We removed the remainder of   the spondylosis here. The PLL identified on the right hand side, did   appear to have calcification of the posterior longitudinal ligament. This   was also seen on preoperative CT. I removed the spondylosis anterior to this. Foraminotomies were performed until widely patent bilaterally. The PLL   was taken down centrally, but not posterior longitudinal ligament under the OPLL and  disc   material removed and spondylosis. At this point shaped vertebral endplates to flat surfaces C6-C7   and utilized the 7 mm machined structural allograft at C6-7. We then placed Forest pin   at C5 and C6. We utilized burs to remove spondylosis and stenosis right greater than left as well as   posterior spondylolisthesis at C5. I proceeded with central and bilateral foraminal   decompression utilizing 1 and 2 mm Kerrisons. A second structural allograft was then tamped into place at C5-6. Due to the patient's seizure history and risks for seizure in periop period, I   utilized additional bone cement over the top of bleeding bone for additional rigidity to withstand any potential grand mal seizure. This was placed at C5-6 and C6-7 anteriorly over the spondylosis and just under. the anterior cervical plate . Anterior screws placed through the anterior cervical plate and all had excellent purchase. C6 was   placed first and then C5 and then C7. I tightened C6 first, then C5 and C7 in seesaw fashion to improve lordosis. Then proceeded with final torque and countertorque to within [employer]. Flouroscopic radiographs demonstrated excellent position of hardware and grafts. Retractors removed and carotid sheath showed no injury and tracheoesophageal complex massaged toward midline and also showed no signs of injury.   After excellent hemostasis had been   obtained, small hemovac drain was placed deep to platysma. The platysma was reapproximated with 3-0 Vicryl suture. Skin   was closed with 4-0 Stratafix. Steri-Strips applied. The patient was placed   into a hard cervical collar. The patient tolerated the procedure well and   was taken to recovery room in stable condition. SSEP's remained at baseline throughout procedure. Halle Mason was present and scrubbed for the entire procedure as my   surgical assistant.         Ruby Lipscomb MD      [facility], [place], operated by [facility] / Regan Solano   D:  06/05/2017   17:54   T:  06/06/2017   11:12   Job #:  [id number]

## 2017-06-06 NOTE — PROGRESS NOTES
Ortho Spine Daily Progress Note    Date of Surgery:  2017      Patient: Arlen Morrow   YOB: 1965  Age: 46 y.o. SUBJECTIVE:   1 Day Post-Op following C5-7 ANTERIOR CERVICAL DISCECTOMY FUSION C5-7 ANTERIOR INSTRUMENTATION , PEAK CAGE, ALLOGRAFT, AUTOGRAFT    The patient c/o sore throat this AM. Pt able to swallow liquids, has not tried solids yet. The patient states that her pre op upper extremity symptoms are much better this AM. The patient's post operative pain is adequately controlled. The patient has not mobilized yet. She denies CP, SOB, N/V/D, dizziness, abdominal pain, HA. -flatus, - BM. OBJECTIVE:     Vital Signs:    Temp (24hrs), Av.9 °F (36.6 °C), Min:97.7 °F (36.5 °C), Max:98 °F (36.7 °C)    Patient Vitals for the past 8 hrs:   BP Temp Pulse Resp SpO2   17 0328 162/86 98 °F (36.7 °C) 74 16 98 %           Physical Exam:  General: A&Ox3, NAD. Respiratory: Respirations are unlabored. Abdomen: S/NT  Surgical site: C,D and I.  Musculoskeletal: Calves are S/NT, Tejinder /intrinsics/wrist extension/biceps/triceps intact, Tejinder dorsi/plantar flexion/EHL intact, Tejinder DP 2+  Neurological:  Tejinder UE's/LE's NVI    Laboratory Values:             No results for input(s): HGB, PLT, INR, CREA, GLU, HGBEXT, PLTEXT in the last 72 hours. No lab exists for component: INREXT  Lab Results   Component Value Date/Time    Sodium 140 2017 12:04 PM    Potassium 4.3 2017 12:04 PM    Chloride 103 2017 12:04 PM    CO2 33 2017 12:04 PM    Glucose 79 2017 12:04 PM    BUN 10 2017 12:04 PM    Creatinine 0.94 2017 12:04 PM    Calcium 9.4 2017 12:04 PM       PLAN:     S/P C5-7 ANTERIOR CERVICAL DISCECTOMY FUSION C5-7 ANTERIOR INSTRUMENTATION , PEAK CAGE, ALLOGRAFT, AUTOGRAFT    Seizure disorder Mobilize with nursing. Monitor for seizure activity     Hemodynamics Stable, nominal blood loss. Wound Continue dressing changes PRN. Post Operative Pain Pain Control: Adequate, continue current regimen. DVT Prophylaxis Continue with SCD'S, Ankle Pump Exercises, Mobilize. Discharge Disposition Discharge plan: Will focus on pain control and mobilizing with nursing. If safe for discharge then d/c home today. Follow up in office in 2-3 weeks. Signed By: Jeffrey Ramos PA-C  June 6, 2017 7:58 AM    Patient reports preop severe right arm pain and numbness is resolved. Feels nervous regarding ambulation. Will have physical therapy consult for ambulation. Late afternoon, PT hayley, recommend patient remain overnight to work on ambulation status and fall precautions. Patient to remain overnight.

## 2017-06-06 NOTE — PROGRESS NOTES
Spoke to Rasheeda Martin NP to let her know that patient did not clear physical therapy. She will let Dr. Clyde Haley know patient has not been discharged today.

## 2017-06-07 VITALS
HEART RATE: 83 BPM | DIASTOLIC BLOOD PRESSURE: 70 MMHG | WEIGHT: 202.16 LBS | TEMPERATURE: 98.7 F | BODY MASS INDEX: 35.82 KG/M2 | SYSTOLIC BLOOD PRESSURE: 128 MMHG | RESPIRATION RATE: 19 BRPM | HEIGHT: 63 IN | OXYGEN SATURATION: 99 %

## 2017-06-07 PROCEDURE — 74011250637 HC RX REV CODE- 250/637: Performed by: PHYSICIAN ASSISTANT

## 2017-06-07 PROCEDURE — 99218 HC RM OBSERVATION: CPT

## 2017-06-07 PROCEDURE — 97530 THERAPEUTIC ACTIVITIES: CPT

## 2017-06-07 PROCEDURE — 97116 GAIT TRAINING THERAPY: CPT

## 2017-06-07 RX ADMIN — Medication 1 SPRAY: at 08:54

## 2017-06-07 RX ADMIN — DOCUSATE SODIUM -SENNOSIDES 1 TABLET: 50; 8.6 TABLET, COATED ORAL at 08:49

## 2017-06-07 RX ADMIN — HYDROCODONE BITARTRATE AND ACETAMINOPHEN 1 TABLET: 7.5; 325 TABLET ORAL at 14:14

## 2017-06-07 RX ADMIN — LAMOTRIGINE 200 MG: 100 TABLET ORAL at 08:48

## 2017-06-07 RX ADMIN — CARBAMAZEPINE 300 MG: 300 CAPSULE, EXTENDED RELEASE ORAL at 08:49

## 2017-06-07 RX ADMIN — CLONAZEPAM 0.5 MG: 0.5 TABLET ORAL at 08:49

## 2017-06-07 RX ADMIN — POLYETHYLENE GLYCOL 3350 17 G: 17 POWDER, FOR SOLUTION ORAL at 08:48

## 2017-06-07 RX ADMIN — LEVETIRACETAM 750 MG: 250 TABLET, FILM COATED ORAL at 08:48

## 2017-06-07 RX ADMIN — CLONAZEPAM 0.5 MG: 0.5 TABLET ORAL at 16:16

## 2017-06-07 RX ADMIN — VITAMIN D, TAB 1000IU (100/BT) 5000 UNITS: 25 TAB at 08:49

## 2017-06-07 RX ADMIN — DIAZEPAM 5 MG: 5 TABLET ORAL at 08:49

## 2017-06-07 NOTE — PROGRESS NOTES
6/7/2017 3:48 PM [facility] was sent discharge clinicals and notified of pt's discharge today via [employer].  PER Dalton

## 2017-06-07 NOTE — PROGRESS NOTES
Handed patient a copy of discharge instructions and 3 scripts. All instructions and scripts read and explained to her and numerous family members. Family confused about dressing and showering. Nurse spoke on phone to a daughter and explained instructions. Daughter verbalized understanding.  Patient and family verbalized understanding

## 2017-06-07 NOTE — PROGRESS NOTES
Problem: Mobility Impaired (Adult and Pediatric)  Goal: *Acute Goals and Plan of Care (Insert Text)  Physical Therapy Goals  Initiated 6/6/2017    1. Patient will move from supine to sit and sit to supine in bed with independence within 4 days. 2. Patient will perform sit to stand with modified independence within 4 days. 3. Patient will ambulate with modified independence for 150 feet with the least restrictive device within 4 days. 4. Patient will ascend/descend 4 stairs with 1 handrail(s) with supervision/set-up within 4 days. 5. Patient will verbalize and demonstrate understanding of spinal precautions (No bending, lifting greater than 5 lbs, or twisting; log-roll technique; frequent repositioning as instructed) within 4 days. PHYSICAL THERAPY TREATMENT/DISCHARGE  Patient: Rk Chan (63 y.o. female)  Date: 6/7/2017  Diagnosis: CERVICAL STENOSIS, CERVICAL DISC HERNIATION   Cervical stenosis of spine <principal problem not specified>  Procedure(s) (LRB):  C5-7 ANTERIOR CERVICAL DISCECTOMY FUSION C5-7 ANTERIOR INSTRUMENTATION , PEAK CAGE, ALLOGRAFT, AUTOGRAFT (N/A) 2 Days Post-Op  Precautions: Spinal, Fall      ASSESSMENT:  Based on the objective data described above, the patient presents with modified independent with ambulation using a rolling walker and independent with all functional mobility. Equipment just got delivered in the room, patient much stable with rolling walker able to ambulate on the 4th floor hallway modified independent. Reviewed spinal precautions and verbalized understanding. Reviewed all safety precaution and home exercise program with the patient, verbalized understanding, clear to go home per Physical Therapy perspective. Skilled physical therapy is not indicated at this time.      Progression toward goals:  [X]      Improving appropriately and progressing toward goals  [ ]      Improving slowly and progressing toward goals  [ ]      Not making progress toward goals and plan of care will be adjusted       PLAN:  Patient will be discharged from physical therapy at this time. Rationale for discharge:  [X] Goals Achieved  [ ] Florence Morris  [ ] Patient not participating in therapy  [ ] Other:  Discharge Recommendations:  None  Further Equipment Recommendations for Discharge:  Equipment delivered in the room       SUBJECTIVE:   Patient stated Ok.       OBJECTIVE DATA SUMMARY:   Critical Behavior:  Neurologic State: Alert  Orientation Level: Oriented X4  Cognition: Appropriate decision making, Appropriate for age attention/concentration, Appropriate safety awareness, Follows commands  Safety/Judgement: Insight into deficits, Good awareness of safety precautions  Functional Mobility Training:  Bed Mobility:  Rolling: Independent  Supine to Sit: Independent  Sit to Supine: Independent  Scooting: Independent        Transfers:  Sit to Stand: Modified independent  Stand to Sit: Modified independent  Stand Pivot Transfers: Modified independent     Bed to Chair: Modified independent                    Balance:  Sitting: Intact  Standing: Intact; With support  Standing - Static: Good  Standing - Dynamic : Good  Ambulation/Gait Training:  Distance (ft): 200 Feet (ft)  Assistive Device: Walker, rolling;Gait belt  Ambulation - Level of Assistance: Modified independent     Gait Description (WDL): Exceptions to WDL  Gait Abnormalities: Path deviations        Base of Support: Widened     Speed/Kimber: Fluctuations                                  Stairs:  Number of Stairs Trained: 4  Stairs - Level of Assistance: Minimum assistance              Rail Use: Both     Therapeutic Exercises:    Instructed patient to continue active range of motion exercise on both legs while up on chair or on bed. Pain:  Pain Scale 1: Numeric (0 - 10)  Pain Intensity 1: 4  Pain Location 1: Throat  Pain Orientation 1:  Inner  Pain Description 1: Sore  Pain Intervention(s) 1: Medication (see MAR)  Activity Tolerance: Good.  Please refer to the flowsheet for vital signs taken during this treatment. After treatment:   [X] Patient left in no apparent distress sitting up in chair  [ ] Patient left in no apparent distress in bed  [X] Call bell left within reach  [X] Nursing notified  [X] Caregiver present  [ ] Bed alarm activated      COMMUNICATION/COLLABORATION:   The patients plan of care was discussed with: Registered Nurse,  and patient     Lala Sepulveda, PT,WCC.    Time Calculation: 23 mins

## 2017-06-07 NOTE — PROGRESS NOTES
Attempted to work with the patient for Physical Therapy, chart reviewed and discussed with nurse patient eating breakfast and asked to come back later she wants to eat first and having pain 6/10 pain scale . Notified nurse and agreed to monitor patient. We will continue to follow up with the patient for therapy. Thank you.

## 2017-06-07 NOTE — PROGRESS NOTES
5:38 AM  Has rested throughout the night, C-collar in place, voids in bathroom with minimal assistance, just 1 person stand by or giving a hand to walk for safety.

## 2017-06-07 NOTE — PROGRESS NOTES
Ortho Spine Daily Progress Note    Date of Surgery:  2017      Patient: Juliana Monterroso   YOB: 1965  Age: 46 y.o. SUBJECTIVE:   2 Days Post-Op following C5-7 ANTERIOR CERVICAL DISCECTOMY FUSION C5-7 ANTERIOR INSTRUMENTATION , PEAK CAGE, ALLOGRAFT, AUTOGRAFT    The patient c/o sore throat otherwise, doing well. The patient's post operative pain is well-controlled. Pain = 3/10. Her pre op arm pain remains resolved. The patient is making steady progress with PT. She denies CP, SOB, N/V/D, dizziness, abdominal pain, HA, +flatus, - BM. OBJECTIVE:     Vital Signs:    Temp (24hrs), Av.9 °F (36.6 °C), Min:97.6 °F (36.4 °C), Max:98.4 °F (36.9 °C)    Patient Vitals for the past 8 hrs:   BP Temp Pulse Resp SpO2   17 0405 118/63 98 °F (36.7 °C) 81 16 94 %           Physical Exam:  General: A&Ox3, NAD. Respiratory: Respirations are unlabored. Abdomen: S/NT  Surgical site: C,D and I - mild drainage. Musculoskeletal: Calves are S/NT, Tejinder /intrinsics/wrist extension/biceps/triceps intact, Tejinder dorsi/plantar flexion/EHL intact, Tejinder DP/RP 2+  Neurological:  Tejinder UE's/LE's NVI    Laboratory Values:             No results for input(s): HGB, PLT, INR, CREA, GLU, HGBEXT, PLTEXT in the last 72 hours. No lab exists for component: INREXT  Lab Results   Component Value Date/Time    Sodium 140 2017 12:04 PM    Potassium 4.3 2017 12:04 PM    Chloride 103 2017 12:04 PM    CO2 33 2017 12:04 PM    Glucose 79 2017 12:04 PM    BUN 10 2017 12:04 PM    Creatinine 0.94 2017 12:04 PM    Calcium 9.4 2017 12:04 PM       PLAN:     S/P C5-7 ANTERIOR CERVICAL DISCECTOMY FUSION C5-7 ANTERIOR INSTRUMENTATION , PEAK CAGE, ALLOGRAFT, AUTOGRAFT    Seizure Diisorder  Mobilize and continue with PT BID until discharged. Monitor for seizure activity. Hemodynamics Stable. Wound Continue dressing changes PRN.      Post Operative Pain Pain Control: Adequate, continue current regimen. DVT Prophylaxis Continue with SCD'S, Ankle Pump Exercises, Mobilize. Discharge Disposition Discharge plan: Will plan on mobilizing with PT this AM and should be safe for discharge home today. Follow up in office in 2-3 weeks.  .       Signed By: Archana Watters PA-C  June 7, 2017 7:52 AM

## 2017-06-07 NOTE — INTERDISCIPLINARY ROUNDS
[address]    Patient Information    Name: Raúl Cutler  Age: 46 y.o. Admission Date: 6/5/2017  Surgery/Procedure: Procedure(s):  C5-7 ANTERIOR CERVICAL DISCECTOMY FUSION C5-7 ANTERIOR INSTRUMENTATION , PEAK CAGE, ALLOGRAFT, AUTOGRAFT  Attending Provider: Paulina Gutierrez MD  Surgeon: Shyam Gatica   Problem List: Active Problems:    Cervical stenosis of spine (6/5/2017)      During rounds the following quality care indicators and evidence based practices were addressed :       PT/OT: Patient mobility - 100', unsteady, will need another session  Bed Mobility Training  Rolling: Independent  Supine to Sit: Independent  Sit to Supine: Independent  Scooting: Independent  Transfer Training  Sit to Stand: Modified independent  Stand to Sit: Modified independent  Bed to Chair: Modified independent      Gait Training  Assistive Device: Walker, rolling, Gait belt  Ambulation - Level of Assistance: Modified independent  Distance (ft): 200 Feet (ft)  Stairs - Level of Assistance: Minimum assistance  Number of Stairs Trained: 4  Rail Use: Both          Pain Assessment  Pain Intensity 1: 6 (06/07/17 1415)  Pain Location 1: Throat  Pain Intervention(s) 1: Medication (see MAR)  Patient Stated Pain Goal: 3    Pain meds: Norco  Antibiotics completed:  Yes  Anticoagulation:  none  Cotto: N   Goals For Today: Progress with PT, pain control    RRAT Score:      Readmit Risk Tool  Support Systems: Family member(s)  Relationship with Primary Physician Group: Seen at least one time within the past 6 months    Discharge Management/Planning:    Readmit Risk Assessment Information:   Low Risk            11       Total Score        3 Relationship with PCP    4 More than 1 Admission in calendar year    4 Patient Insurance is Medicare, Medicaid or Self Pay        Criteria that do not apply:    Patient Living Status    Patient Length of Stay > 5    Charlson Comorbidity Score         Readmit Risk Tool  Support Systems: Family member(s)  Relationship with Primary Physician Group: Seen at least one time within the past 6 months    [address]: [address]:  Anticipated Date of Discharge: today pending PT clearance    Interdisciplinary team rounds were held with the following team members: Nurse Practitioner, Case Management, Nursing, Pharmacy, and Rehab. See clinical pathway and/or care plan for interventions and desired outcomes.

## 2017-06-11 ENCOUNTER — HOSPITAL ENCOUNTER (INPATIENT)
Age: 52
LOS: 1 days | Discharge: HOME HEALTH | DRG: 722 | End: 2017-06-13
Attending: EMERGENCY MEDICINE | Admitting: ORTHOPAEDIC SURGERY
Payer: MEDICAID

## 2017-06-11 ENCOUNTER — APPOINTMENT (OUTPATIENT)
Dept: GENERAL RADIOLOGY | Age: 52
DRG: 722 | End: 2017-06-11
Attending: EMERGENCY MEDICINE
Payer: MEDICAID

## 2017-06-11 ENCOUNTER — APPOINTMENT (OUTPATIENT)
Dept: CT IMAGING | Age: 52
DRG: 722 | End: 2017-06-11
Attending: EMERGENCY MEDICINE
Payer: MEDICAID

## 2017-06-11 DIAGNOSIS — R50.9 FEVER, UNSPECIFIED FEVER CAUSE: Primary | ICD-10-CM

## 2017-06-11 DIAGNOSIS — A41.9 SEPSIS, DUE TO UNSPECIFIED ORGANISM: ICD-10-CM

## 2017-06-11 DIAGNOSIS — D72.829 LEUKOCYTOSIS, UNSPECIFIED TYPE: ICD-10-CM

## 2017-06-11 LAB
ALBUMIN SERPL BCP-MCNC: 3.4 G/DL (ref 3.5–5)
ALBUMIN/GLOB SERPL: 0.8 {RATIO} (ref 1.1–2.2)
ALP SERPL-CCNC: 133 U/L (ref 45–117)
ALT SERPL-CCNC: 22 U/L (ref 12–78)
ANION GAP BLD CALC-SCNC: 8 MMOL/L (ref 5–15)
AST SERPL W P-5'-P-CCNC: 25 U/L (ref 15–37)
BASOPHILS # BLD AUTO: 0 K/UL (ref 0–0.1)
BASOPHILS # BLD: 0 % (ref 0–1)
BILIRUB SERPL-MCNC: 0.3 MG/DL (ref 0.2–1)
BUN SERPL-MCNC: 11 MG/DL (ref 6–20)
BUN/CREAT SERPL: 11 (ref 12–20)
CALCIUM SERPL-MCNC: 10.4 MG/DL (ref 8.5–10.1)
CHLORIDE SERPL-SCNC: 102 MMOL/L (ref 97–108)
CO2 SERPL-SCNC: 29 MMOL/L (ref 21–32)
CREAT SERPL-MCNC: 0.98 MG/DL (ref 0.55–1.02)
EOSINOPHIL # BLD: 0.1 K/UL (ref 0–0.4)
EOSINOPHIL NFR BLD: 1 % (ref 0–7)
ERYTHROCYTE [DISTWIDTH] IN BLOOD BY AUTOMATED COUNT: 12.7 % (ref 11.5–14.5)
GLOBULIN SER CALC-MCNC: 4.5 G/DL (ref 2–4)
GLUCOSE SERPL-MCNC: 148 MG/DL (ref 65–100)
HCT VFR BLD AUTO: 36.8 % (ref 35–47)
HGB BLD-MCNC: 13.1 G/DL (ref 11.5–16)
LACTATE SERPL-SCNC: 2.1 MMOL/L (ref 0.4–2)
LYMPHOCYTES # BLD AUTO: 12 % (ref 12–49)
LYMPHOCYTES # BLD: 1.5 K/UL (ref 0.8–3.5)
MCH RBC QN AUTO: 32.1 PG (ref 26–34)
MCHC RBC AUTO-ENTMCNC: 35.6 G/DL (ref 30–36.5)
MCV RBC AUTO: 90.2 FL (ref 80–99)
MONOCYTES # BLD: 0.7 K/UL (ref 0–1)
MONOCYTES NFR BLD AUTO: 5 % (ref 5–13)
NEUTS SEG # BLD: 10.6 K/UL (ref 1.8–8)
NEUTS SEG NFR BLD AUTO: 82 % (ref 32–75)
PLATELET # BLD AUTO: 220 K/UL (ref 150–400)
POTASSIUM SERPL-SCNC: 4.3 MMOL/L (ref 3.5–5.1)
PROT SERPL-MCNC: 7.9 G/DL (ref 6.4–8.2)
RBC # BLD AUTO: 4.08 M/UL (ref 3.8–5.2)
SODIUM SERPL-SCNC: 139 MMOL/L (ref 136–145)
WBC # BLD AUTO: 12.9 K/UL (ref 3.6–11)

## 2017-06-11 PROCEDURE — 83605 ASSAY OF LACTIC ACID: CPT | Performed by: EMERGENCY MEDICINE

## 2017-06-11 PROCEDURE — 81001 URINALYSIS AUTO W/SCOPE: CPT | Performed by: EMERGENCY MEDICINE

## 2017-06-11 PROCEDURE — 74011250637 HC RX REV CODE- 250/637: Performed by: EMERGENCY MEDICINE

## 2017-06-11 PROCEDURE — 86140 C-REACTIVE PROTEIN: CPT | Performed by: EMERGENCY MEDICINE

## 2017-06-11 PROCEDURE — 70491 CT SOFT TISSUE NECK W/DYE: CPT

## 2017-06-11 PROCEDURE — 85025 COMPLETE CBC W/AUTO DIFF WBC: CPT | Performed by: EMERGENCY MEDICINE

## 2017-06-11 PROCEDURE — 74011636320 HC RX REV CODE- 636/320: Performed by: RADIOLOGY

## 2017-06-11 PROCEDURE — 99285 EMERGENCY DEPT VISIT HI MDM: CPT

## 2017-06-11 PROCEDURE — 80053 COMPREHEN METABOLIC PANEL: CPT | Performed by: EMERGENCY MEDICINE

## 2017-06-11 PROCEDURE — 87040 BLOOD CULTURE FOR BACTERIA: CPT | Performed by: EMERGENCY MEDICINE

## 2017-06-11 PROCEDURE — 96365 THER/PROPH/DIAG IV INF INIT: CPT

## 2017-06-11 PROCEDURE — 74011250636 HC RX REV CODE- 250/636: Performed by: EMERGENCY MEDICINE

## 2017-06-11 PROCEDURE — 96361 HYDRATE IV INFUSION ADD-ON: CPT

## 2017-06-11 PROCEDURE — 71010 XR CHEST PORT: CPT

## 2017-06-11 PROCEDURE — 36415 COLL VENOUS BLD VENIPUNCTURE: CPT | Performed by: EMERGENCY MEDICINE

## 2017-06-11 PROCEDURE — 94762 N-INVAS EAR/PLS OXIMTRY CONT: CPT

## 2017-06-11 PROCEDURE — 96374 THER/PROPH/DIAG INJ IV PUSH: CPT

## 2017-06-11 PROCEDURE — 93005 ELECTROCARDIOGRAM TRACING: CPT

## 2017-06-11 PROCEDURE — 87086 URINE CULTURE/COLONY COUNT: CPT | Performed by: EMERGENCY MEDICINE

## 2017-06-11 RX ORDER — ACETAMINOPHEN 325 MG/1
650 TABLET ORAL
Status: COMPLETED | OUTPATIENT
Start: 2017-06-11 | End: 2017-06-11

## 2017-06-11 RX ORDER — SODIUM CHLORIDE 0.9 % (FLUSH) 0.9 %
5-10 SYRINGE (ML) INJECTION AS NEEDED
Status: DISCONTINUED | OUTPATIENT
Start: 2017-06-11 | End: 2017-06-13 | Stop reason: HOSPADM

## 2017-06-11 RX ORDER — IPRATROPIUM BROMIDE AND ALBUTEROL SULFATE 2.5; .5 MG/3ML; MG/3ML
SOLUTION RESPIRATORY (INHALATION)
Status: DISPENSED
Start: 2017-06-11 | End: 2017-06-12

## 2017-06-11 RX ADMIN — IOPAMIDOL 100 ML: 612 INJECTION, SOLUTION INTRAVENOUS at 23:58

## 2017-06-11 RX ADMIN — ACETAMINOPHEN 650 MG: 325 TABLET ORAL at 22:55

## 2017-06-11 RX ADMIN — SODIUM CHLORIDE 2178 ML: 900 INJECTION, SOLUTION INTRAVENOUS at 22:45

## 2017-06-11 NOTE — IP AVS SNAPSHOT
Noy Villegas 
 
 
 [address] 
[phone] Patient: Felton Samuels MRN: [id number] :1965 You are allergic to the following Allergen Reactions Aspirin Other (comments) Chest pain. Has taken ibuprofen ,diclofenac before Codeine Other (comments) Chest pain Phenobarbital Other (comments)  
 hallucinate Recent Documentation Height Weight BMI OB Status Smoking Status 1.6 m 92 kg 35.93 kg/m2 Menopause Never Smoker Unresulted Labs Order Current Status CULTURE, URINE In process LAMOTRIGINE (LAMICTAL) In process CULTURE, BLOOD Preliminary result CULTURE, BLOOD Preliminary result Emergency Contacts Name Discharge Info Relation Home Work Mobile Radha Oswald  Child [2] [phone] Jayson Henao DISCHARGE CAREGIVER [3] Friend [5]   [phone] About your hospitalization You were admitted on:  2017 You last received care in the:  [facility] 3 PRO CARE TELE 2 You were discharged on:  2017 Unit phone number:  [phone] Why you were hospitalized Your primary diagnosis was:  Not on File Your diagnoses also included:  Sirs (Systemic Inflammatory Response Syndrome) (Hcc) Providers Seen During Your Hospitalizations Provider Role Specialty Primary office phone Daleen Sicard, DO Attending Provider Emergency Medicine [phone] Freddy Ferguson MD Attending Provider Orthopedic Surgery [phone] Your Primary Care Physician (PCP) Primary Care Physician Office Phone Office Fax Keara CURRY [phone] ** None ** Follow-up Information Follow up With Details Comments Contact Info Román Kaminski NP   [address] [facility] [address] [id number] [facility] [address] 
[phone] Current Discharge Medication List  
  
START taking these medications Dose & Instructions Dispensing Information Comments Morning Noon Evening Bedtime  
 iron,carbonyl-vitamin C 65 mg iron- 125 mg Tbec Commonly known as:  VITRON-C Your last dose was: Your next dose is:    
   
   
 Dose:  65 mg Take 65 mg by mouth two (2) times a day. Indications: post op anemia Quantity:  60 Tab Refills:  1 Omeprazole delayed release 20 mg tablet Commonly known as:  PRILOSEC D/R Your last dose was: Your next dose is:    
   
   
 Dose:  20 mg Take 1 Tab by mouth daily. Indications: gastroesophageal reflux disease, HEARTBURN Quantity:  30 Tab Refills:  1 CONTINUE these medications which have NOT CHANGED Dose & Instructions Dispensing Information Comments Morning Noon Evening Bedtime  
 carBAMazepine  mg capsule Commonly known as:  CARBATROL ER Your last dose was: Your next dose is:    
   
   
 Dose:  300 mg Take 300 mg by mouth two (2) times a day. 0900 and 2100 Refills:  0  
     
   
   
   
  
 cholecalciferol (VITAMIN D3) 5,000 unit Tab tablet Commonly known as:  VITAMIN D3 Your last dose was: Your next dose is:    
   
   
 Dose:  5000 Units Take 5,000 Units by mouth every evening. [address] Refills:  0  
     
   
   
   
  
 clonazePAM 0.5 mg tablet Commonly known as:  Tj Less Your last dose was: Your next dose is:    
   
   
 Dose:  0.5 mg Take 0.5 mg by mouth three (3) times daily. 0900, 1700, 2100 Refills:  0 HYDROcodone-acetaminophen 7.5-325 mg per tablet Commonly known as:  Kriste Hernesto Your last dose was: Your next dose is:    
   
   
 Dose:  1 Tab Take 1 Tab by mouth every four (4) hours as needed. Max Daily Amount: 6 Tabs. Quantity:  60 Tab Refills:  0  
     
   
   
   
  
 lamoTRIgine 200 mg tablet Commonly known as: LaMICtal  
   
Your last dose was: Your next dose is:    
   
   
 Dose:  200 mg Take 200 mg by mouth two (2) times a day. 0900 and 2100 Refills:  0  
     
   
   
   
  
 levETIRAcetam 100 mg/mL solution Commonly known as:  KEPPRA Your last dose was: Your next dose is:    
   
   
 Dose:  750 mg Take 750 mg by mouth two (2) times a day. 0900 and 2100 Refills:  0 Where to Get Your Medications Information on where to get these meds will be given to you by the nurse or doctor. ! Ask your nurse or doctor about these medications HYDROcodone-acetaminophen 7.5-325 mg per tablet  
 iron,carbonyl-vitamin C 65 mg iron- 125 mg Tbec Omeprazole delayed release 20 mg tablet Discharge Instructions Adelaida Cisneros M.D. [address] [facility] Office Phone: [phone] Neck Surgery Discharge Instructions Activities ? You are going home a well person, be as active as possible. Your only exercise should be walking. Start with short frequent walks and increase your walking distance each day. Start with walking twice a day for 5 minutes and increase your distance each day 2-3 minutes until you reach 25 minutes twice a day. Limit the amount of time you sit to 20-30 minute intervals. Sitting for prolonged periods of time will be uncomfortable for you following your surgery. ? Do not lift anything over five pounds, and do not do any bending or straining. ? Avoid reaching overhead for 2-3 weeks ? Do not do any neck exercises until you have been instructed by your doctor. ? When you are in the bed, you may lay on your back or on either side. Do not lay on your stomach. ? Continue using your incentive spirometer regularly for deep breathing exercises ? You may resume sexual relations 2 weeks after your surgery Diet ? You may resume your normal diet.   If your throat is sore, you may want to eat soft foods for a few days. Be sure to drink plenty of fluids, it is important to keep yourself hydrated. If you begin having trouble swallowing, call the office immediately. ? Avoid alcoholic beverages and ABSOLUTELY NO tobacco products. Tobacco products will interfere with your healing. If you continue to use tobacco, you may end up needing another surgery in the future. Medications ? Do not take anti-inflammatory medications or aspirin unless instructed by your physician. ? Take your pain medication as directed. ? Do NOT take additional Tylenol if your prescribed pain medication has acetaminophen in it (Endocet/Percocet, Lortab, Norco). ? It is important to have regular bowel movements. Pain medications may cause constipation. Stool softeners, prune juice, and increasing your water and fiber intake may help in preventing constipation. ? Do NOT take laxatives if at all possible except in severe situations. It can results in a vicious cycle of constipation and diarrhea. ? Do not be alarmed if you still have some of the same symptoms you had prior to surgery. The nerves often require time to heal after the pressure has been relieved. You may experience pain in your shoulders or between your shoulder blades, which is common after this surgery. The level of pain you experience should improve as your body heals. Driving ? You may not drive or return to work until instructed by your physician. However, you may ride in the car for doctors appointments ONLY. Neck collar ? You are required to wear your cervical collar at all times. You may remove the collar long enough to change the pads when needed and to change your dressing each day. ? Do not bend or twist your neck when your collar is removed. It is best to have someone assist you when changing the pads and dressing to prevent you from bending your neck. Showering ? You may shower in approximately 4 days after your surgery if your incision is not draining. ? Leave the dressing on during your shower but avoid getting the dressing wet. ? Do not use tub baths, swimming pools or Jacuzzis. ? Neck collars may need to be worn even while in the shower. If your collar is removed for showering, be sure not to turn your neck while the collar is off. Also, make sure you put dry pads on your collar after your shower. Caring for your incision ? Leave the dressing on x 7 days after surgery. At that point, you may remove the dressing and keep the incision open to air. ? You will probably have steri-strips on your incision (small, white pieces of tape). Do not pull the steri-strips; they will fall off on their own after several days. If you have sutures or staples, they will be removed when you see your physician. ? Do not rub or apply any lotions or ointments to your incision site. Follow Up 
? Once you are home, call your physicians office to schedule an appointment for your 1-2 week postoperative visit. Notify your physician if you develop any of the following conditions: 
? Fever above 101 degrees for 24 hours. ? Nausea or vomiting. ? Severe headache. 
? Inability to urinate. ? Loss of bowel or bladder control (sudden onset of incontinence). ? Changes in sensation in your extremities (numbness, tingling, loss of color). ? Severe pain or pain not relieved by medications. ? Redness, swelling, or drainage from your incision. ? Persistent pain in the chest.  
? Pain in the calf of either leg. 
? Increased weakness (if this is greater than before your surgery). Discharge Orders None [facility] Announcement We are excited to announce that we are making your provider's discharge notes available to you in [employer].   You will see these notes when they are completed and signed by the physician that discharged you from your recent hospital stay. If you have any questions or concerns about any information you see in [employer], please call the Health Information Department where you were seen or reach out to your Primary Care Provider for more information about your plan of care. Introducing [facility]! Dear Tiffani Pham: Thank you for requesting a [employer] account. Our records indicate that you already have an active [employer] account. You can access your account anytime at [email or web address] Did you know that you can access your hospital and ER discharge instructions at any time in [employer]? You can also review all of your test results from your hospital stay or ER visit. Additional Information If you have questions, please visit the Frequently Asked Questions section of the [employer] website at [email or web address] Remember, [employer] is NOT to be used for urgent needs. For medical emergencies, dial 911. Now available from your iPhone and Android! General Information Please provide this summary of care documentation to your next provider. Patient Signature:  ____________________________________________________________ Date:  ____________________________________________________________  
  
Harrison Cueto Provider Signature:  ____________________________________________________________ Date:  ____________________________________________________________

## 2017-06-11 NOTE — IP AVS SNAPSHOT
Current Discharge Medication List  
  
START taking these medications Dose & Instructions Dispensing Information Comments Morning Noon Evening Bedtime  
 iron,carbonyl-vitamin C 65 mg iron- 125 mg Tbec Commonly known as:  VITRON-C Your last dose was: Your next dose is:    
   
   
 Dose:  65 mg Take 65 mg by mouth two (2) times a day. Indications: post op anemia Quantity:  60 Tab Refills:  1 Omeprazole delayed release 20 mg tablet Commonly known as:  PRILOSEC D/R Your last dose was: Your next dose is:    
   
   
 Dose:  20 mg Take 1 Tab by mouth daily. Indications: gastroesophageal reflux disease, HEARTBURN Quantity:  30 Tab Refills:  1 CONTINUE these medications which have NOT CHANGED Dose & Instructions Dispensing Information Comments Morning Noon Evening Bedtime  
 carBAMazepine  mg capsule Commonly known as:  CARBATROL ER Your last dose was: Your next dose is:    
   
   
 Dose:  300 mg Take 300 mg by mouth two (2) times a day. 0900 and 2100 Refills:  0  
     
   
   
   
  
 cholecalciferol (VITAMIN D3) 5,000 unit Tab tablet Commonly known as:  VITAMIN D3 Your last dose was: Your next dose is:    
   
   
 Dose:  5000 Units Take 5,000 Units by mouth every evening. [address] Refills:  0  
     
   
   
   
  
 clonazePAM 0.5 mg tablet Commonly known as:  Estefani Loge Your last dose was: Your next dose is:    
   
   
 Dose:  0.5 mg Take 0.5 mg by mouth three (3) times daily. 0900, 1700, 2100 Refills:  0 HYDROcodone-acetaminophen 7.5-325 mg per tablet Commonly known as:  Frank Braxton Your last dose was: Your next dose is:    
   
   
 Dose:  1 Tab Take 1 Tab by mouth every four (4) hours as needed. Max Daily Amount: 6 Tabs. Quantity:  60 Tab Refills:  0 lamoTRIgine 200 mg tablet Commonly known as: LaMICtal  
   
Your last dose was: Your next dose is:    
   
   
 Dose:  200 mg Take 200 mg by mouth two (2) times a day. 0900 and 2100 Refills:  0  
     
   
   
   
  
 levETIRAcetam 100 mg/mL solution Commonly known as:  KEPPRA Your last dose was: Your next dose is:    
   
   
 Dose:  750 mg Take 750 mg by mouth two (2) times a day. 0900 and 2100 Refills:  0 Where to Get Your Medications Information on where to get these meds will be given to you by the nurse or doctor. ! Ask your nurse or doctor about these medications HYDROcodone-acetaminophen 7.5-325 mg per tablet  
 iron,carbonyl-vitamin C 65 mg iron- 125 mg Tbec Omeprazole delayed release 20 mg tablet

## 2017-06-12 ENCOUNTER — APPOINTMENT (OUTPATIENT)
Dept: GENERAL RADIOLOGY | Age: 52
DRG: 722 | End: 2017-06-12
Attending: EMERGENCY MEDICINE
Payer: MEDICAID

## 2017-06-12 ENCOUNTER — APPOINTMENT (OUTPATIENT)
Dept: CT IMAGING | Age: 52
DRG: 722 | End: 2017-06-12
Attending: PHYSICIAN ASSISTANT
Payer: MEDICAID

## 2017-06-12 PROBLEM — A41.9 SEPSIS (HCC): Status: ACTIVE | Noted: 2017-06-12

## 2017-06-12 PROBLEM — R65.10 SIRS (SYSTEMIC INFLAMMATORY RESPONSE SYNDROME) (HCC): Status: ACTIVE | Noted: 2017-06-12

## 2017-06-12 LAB
ABO + RH BLD: NORMAL
ALBUMIN SERPL BCP-MCNC: 3 G/DL (ref 3.5–5)
ALBUMIN/GLOB SERPL: 0.8 {RATIO} (ref 1.1–2.2)
ALP SERPL-CCNC: 111 U/L (ref 45–117)
ALT SERPL-CCNC: 19 U/L (ref 12–78)
ANION GAP BLD CALC-SCNC: 7 MMOL/L (ref 5–15)
APPEARANCE UR: ABNORMAL
AST SERPL W P-5'-P-CCNC: 21 U/L (ref 15–37)
ATRIAL RATE: 132 BPM
BACTERIA URNS QL MICRO: NEGATIVE /HPF
BASOPHILS # BLD AUTO: 0 K/UL (ref 0–0.1)
BASOPHILS # BLD: 0 % (ref 0–1)
BILIRUB SERPL-MCNC: 0.3 MG/DL (ref 0.2–1)
BILIRUB UR QL: NEGATIVE
BLOOD GROUP ANTIBODIES SERPL: NORMAL
BUN SERPL-MCNC: 10 MG/DL (ref 6–20)
BUN/CREAT SERPL: 12 (ref 12–20)
CALCIUM SERPL-MCNC: 9.2 MG/DL (ref 8.5–10.1)
CALCULATED P AXIS, ECG09: 48 DEGREES
CALCULATED R AXIS, ECG10: 27 DEGREES
CALCULATED T AXIS, ECG11: 59 DEGREES
CARBAMAZEPINE SERPL-MCNC: 10 UG/ML (ref 4–12)
CHLORIDE SERPL-SCNC: 107 MMOL/L (ref 97–108)
CO2 SERPL-SCNC: 27 MMOL/L (ref 21–32)
COLOR UR: ABNORMAL
CREAT SERPL-MCNC: 0.86 MG/DL (ref 0.55–1.02)
CRP SERPL-MCNC: 3.73 MG/DL
DIAGNOSIS, 93000: NORMAL
EOSINOPHIL # BLD: 0.1 K/UL (ref 0–0.4)
EOSINOPHIL NFR BLD: 1 % (ref 0–7)
EPITH CASTS URNS QL MICRO: ABNORMAL /LPF
ERYTHROCYTE [DISTWIDTH] IN BLOOD BY AUTOMATED COUNT: 12.6 % (ref 11.5–14.5)
ERYTHROCYTE [SEDIMENTATION RATE] IN BLOOD: 44 MM/HR (ref 0–30)
GLOBULIN SER CALC-MCNC: 3.9 G/DL (ref 2–4)
GLUCOSE SERPL-MCNC: 111 MG/DL (ref 65–100)
GLUCOSE UR STRIP.AUTO-MCNC: NEGATIVE MG/DL
HCT VFR BLD AUTO: 33.7 % (ref 35–47)
HGB BLD-MCNC: 11.6 G/DL (ref 11.5–16)
HGB UR QL STRIP: NEGATIVE
INR PPP: 1 (ref 0.9–1.1)
KETONES UR QL STRIP.AUTO: NEGATIVE MG/DL
LACTATE SERPL-SCNC: 1.2 MMOL/L (ref 0.4–2)
LEUKOCYTE ESTERASE UR QL STRIP.AUTO: ABNORMAL
LYMPHOCYTES # BLD AUTO: 19 % (ref 12–49)
LYMPHOCYTES # BLD: 2.3 K/UL (ref 0.8–3.5)
MCH RBC QN AUTO: 31.4 PG (ref 26–34)
MCHC RBC AUTO-ENTMCNC: 34.4 G/DL (ref 30–36.5)
MCV RBC AUTO: 91.1 FL (ref 80–99)
MONOCYTES # BLD: 0.6 K/UL (ref 0–1)
MONOCYTES NFR BLD AUTO: 5 % (ref 5–13)
NEUTS SEG # BLD: 8.9 K/UL (ref 1.8–8)
NEUTS SEG NFR BLD AUTO: 75 % (ref 32–75)
NITRITE UR QL STRIP.AUTO: NEGATIVE
P-R INTERVAL, ECG05: 150 MS
PH UR STRIP: 8 [PH] (ref 5–8)
PLATELET # BLD AUTO: 190 K/UL (ref 150–400)
POTASSIUM SERPL-SCNC: 4.2 MMOL/L (ref 3.5–5.1)
PROT SERPL-MCNC: 6.9 G/DL (ref 6.4–8.2)
PROT UR STRIP-MCNC: NEGATIVE MG/DL
PROTHROMBIN TIME: 10.5 SEC (ref 9–11.1)
Q-T INTERVAL, ECG07: 276 MS
QRS DURATION, ECG06: 84 MS
QTC CALCULATION (BEZET), ECG08: 408 MS
RBC # BLD AUTO: 3.7 M/UL (ref 3.8–5.2)
RBC #/AREA URNS HPF: ABNORMAL /HPF (ref 0–5)
SODIUM SERPL-SCNC: 141 MMOL/L (ref 136–145)
SP GR UR REFRACTOMETRY: 1.01 (ref 1–1.03)
SPECIMEN EXP DATE BLD: NORMAL
UROBILINOGEN UR QL STRIP.AUTO: 0.2 EU/DL (ref 0.2–1)
VENTRICULAR RATE, ECG03: 132 BPM
WBC # BLD AUTO: 11.8 K/UL (ref 3.6–11)
WBC URNS QL MICRO: ABNORMAL /HPF (ref 0–4)

## 2017-06-12 PROCEDURE — 36415 COLL VENOUS BLD VENIPUNCTURE: CPT | Performed by: EMERGENCY MEDICINE

## 2017-06-12 PROCEDURE — 85025 COMPLETE CBC W/AUTO DIFF WBC: CPT | Performed by: PHYSICIAN ASSISTANT

## 2017-06-12 PROCEDURE — 74011250637 HC RX REV CODE- 250/637

## 2017-06-12 PROCEDURE — 74011250636 HC RX REV CODE- 250/636: Performed by: PHYSICIAN ASSISTANT

## 2017-06-12 PROCEDURE — 85652 RBC SED RATE AUTOMATED: CPT | Performed by: EMERGENCY MEDICINE

## 2017-06-12 PROCEDURE — 74011000258 HC RX REV CODE- 258: Performed by: EMERGENCY MEDICINE

## 2017-06-12 PROCEDURE — 93970 EXTREMITY STUDY: CPT

## 2017-06-12 PROCEDURE — E0190 POSITIONING CUSHION: HCPCS

## 2017-06-12 PROCEDURE — 80175 DRUG SCREEN QUAN LAMOTRIGINE: CPT | Performed by: PHYSICIAN ASSISTANT

## 2017-06-12 PROCEDURE — 80156 ASSAY CARBAMAZEPINE TOTAL: CPT | Performed by: PHYSICIAN ASSISTANT

## 2017-06-12 PROCEDURE — 74011636320 HC RX REV CODE- 636/320: Performed by: ORTHOPAEDIC SURGERY

## 2017-06-12 PROCEDURE — 74011000258 HC RX REV CODE- 258: Performed by: PHYSICIAN ASSISTANT

## 2017-06-12 PROCEDURE — 74011250636 HC RX REV CODE- 250/636: Performed by: EMERGENCY MEDICINE

## 2017-06-12 PROCEDURE — 80053 COMPREHEN METABOLIC PANEL: CPT | Performed by: PHYSICIAN ASSISTANT

## 2017-06-12 PROCEDURE — 74011250636 HC RX REV CODE- 250/636: Performed by: FAMILY MEDICINE

## 2017-06-12 PROCEDURE — 74011250637 HC RX REV CODE- 250/637: Performed by: PHYSICIAN ASSISTANT

## 2017-06-12 PROCEDURE — 83605 ASSAY OF LACTIC ACID: CPT | Performed by: EMERGENCY MEDICINE

## 2017-06-12 PROCEDURE — 80177 DRUG SCRN QUAN LEVETIRACETAM: CPT | Performed by: PHYSICIAN ASSISTANT

## 2017-06-12 PROCEDURE — 73610 X-RAY EXAM OF ANKLE: CPT

## 2017-06-12 PROCEDURE — 86900 BLOOD TYPING SEROLOGIC ABO: CPT | Performed by: PHYSICIAN ASSISTANT

## 2017-06-12 PROCEDURE — 65660000000 HC RM CCU STEPDOWN

## 2017-06-12 PROCEDURE — 85610 PROTHROMBIN TIME: CPT | Performed by: PHYSICIAN ASSISTANT

## 2017-06-12 PROCEDURE — 71275 CT ANGIOGRAPHY CHEST: CPT

## 2017-06-12 PROCEDURE — 77030027138 HC INCENT SPIROMETER -A

## 2017-06-12 RX ORDER — CLONAZEPAM 0.5 MG/1
0.5 TABLET ORAL 3 TIMES DAILY
Status: DISCONTINUED | OUTPATIENT
Start: 2017-06-12 | End: 2017-06-13 | Stop reason: HOSPADM

## 2017-06-12 RX ORDER — SODIUM CHLORIDE 0.9 % (FLUSH) 0.9 %
5-10 SYRINGE (ML) INJECTION AS NEEDED
Status: DISCONTINUED | OUTPATIENT
Start: 2017-06-12 | End: 2017-06-13 | Stop reason: HOSPADM

## 2017-06-12 RX ORDER — SODIUM CHLORIDE 9 MG/ML
125 INJECTION, SOLUTION INTRAVENOUS CONTINUOUS
Status: DISCONTINUED | OUTPATIENT
Start: 2017-06-12 | End: 2017-06-13 | Stop reason: HOSPADM

## 2017-06-12 RX ORDER — MELATONIN
5000 EVERY EVENING
Status: DISCONTINUED | OUTPATIENT
Start: 2017-06-12 | End: 2017-06-13 | Stop reason: HOSPADM

## 2017-06-12 RX ORDER — SODIUM CHLORIDE 0.9 % (FLUSH) 0.9 %
5-10 SYRINGE (ML) INJECTION EVERY 8 HOURS
Status: DISCONTINUED | OUTPATIENT
Start: 2017-06-12 | End: 2017-06-13 | Stop reason: HOSPADM

## 2017-06-12 RX ORDER — ONDANSETRON 2 MG/ML
6 INJECTION INTRAMUSCULAR; INTRAVENOUS
Status: DISCONTINUED | OUTPATIENT
Start: 2017-06-12 | End: 2017-06-13 | Stop reason: HOSPADM

## 2017-06-12 RX ORDER — NALOXONE HYDROCHLORIDE 0.4 MG/ML
0.4 INJECTION, SOLUTION INTRAMUSCULAR; INTRAVENOUS; SUBCUTANEOUS AS NEEDED
Status: DISCONTINUED | OUTPATIENT
Start: 2017-06-12 | End: 2017-06-13 | Stop reason: HOSPADM

## 2017-06-12 RX ORDER — LAMOTRIGINE 100 MG/1
200 TABLET ORAL EVERY 12 HOURS
Status: DISCONTINUED | OUTPATIENT
Start: 2017-06-12 | End: 2017-06-13 | Stop reason: HOSPADM

## 2017-06-12 RX ORDER — ACETAMINOPHEN 500 MG
500 TABLET ORAL
Status: DISCONTINUED | OUTPATIENT
Start: 2017-06-12 | End: 2017-06-13 | Stop reason: HOSPADM

## 2017-06-12 RX ORDER — LEVETIRACETAM 100 MG/ML
750 SOLUTION ORAL 2 TIMES DAILY
COMMUNITY
End: 2019-08-14

## 2017-06-12 RX ORDER — CARBAMAZEPINE 300 MG/1
300 CAPSULE, EXTENDED RELEASE ORAL EVERY 12 HOURS
Status: DISCONTINUED | OUTPATIENT
Start: 2017-06-12 | End: 2017-06-13 | Stop reason: HOSPADM

## 2017-06-12 RX ORDER — DIPHENHYDRAMINE HYDROCHLORIDE 50 MG/ML
25 INJECTION, SOLUTION INTRAMUSCULAR; INTRAVENOUS
Status: DISCONTINUED | OUTPATIENT
Start: 2017-06-12 | End: 2017-06-13 | Stop reason: HOSPADM

## 2017-06-12 RX ORDER — AMOXICILLIN 250 MG
1 CAPSULE ORAL DAILY
Status: DISCONTINUED | OUTPATIENT
Start: 2017-06-12 | End: 2017-06-13 | Stop reason: HOSPADM

## 2017-06-12 RX ORDER — HYDROCODONE BITARTRATE AND ACETAMINOPHEN 7.5; 325 MG/1; MG/1
1 TABLET ORAL
Status: DISCONTINUED | OUTPATIENT
Start: 2017-06-12 | End: 2017-06-13 | Stop reason: HOSPADM

## 2017-06-12 RX ADMIN — VANCOMYCIN HYDROCHLORIDE 1000 MG: 1 INJECTION, POWDER, LYOPHILIZED, FOR SOLUTION INTRAVENOUS at 14:22

## 2017-06-12 RX ADMIN — SODIUM CHLORIDE 125 ML/HR: 900 INJECTION, SOLUTION INTRAVENOUS at 20:26

## 2017-06-12 RX ADMIN — HYDROCODONE BITARTRATE AND ACETAMINOPHEN 1 TABLET: 7.5; 325 TABLET ORAL at 13:57

## 2017-06-12 RX ADMIN — CLONAZEPAM 0.5 MG: 0.5 TABLET ORAL at 21:34

## 2017-06-12 RX ADMIN — HYDROCODONE BITARTRATE AND ACETAMINOPHEN 1 TABLET: 7.5; 325 TABLET ORAL at 04:31

## 2017-06-12 RX ADMIN — SODIUM CHLORIDE 125 ML/HR: 900 INJECTION, SOLUTION INTRAVENOUS at 13:57

## 2017-06-12 RX ADMIN — DOCUSATE SODIUM -SENNOSIDES 1 TABLET: 50; 8.6 TABLET, COATED ORAL at 09:06

## 2017-06-12 RX ADMIN — CARBAMAZEPINE 300 MG: 300 CAPSULE, EXTENDED RELEASE ORAL at 09:44

## 2017-06-12 RX ADMIN — CLONAZEPAM 0.5 MG: 0.5 TABLET ORAL at 17:07

## 2017-06-12 RX ADMIN — PIPERACILLIN SODIUM,TAZOBACTAM SODIUM 3.38 G: 3; .375 INJECTION, POWDER, FOR SOLUTION INTRAVENOUS at 02:20

## 2017-06-12 RX ADMIN — PIPERACILLIN SODIUM,TAZOBACTAM SODIUM 3.38 G: 3; .375 INJECTION, POWDER, FOR SOLUTION INTRAVENOUS at 09:11

## 2017-06-12 RX ADMIN — LEVETIRACETAM 750 MG: 100 SOLUTION ORAL at 09:44

## 2017-06-12 RX ADMIN — LAMOTRIGINE 200 MG: 100 TABLET ORAL at 09:06

## 2017-06-12 RX ADMIN — VANCOMYCIN HYDROCHLORIDE 1750 MG: 10 INJECTION, POWDER, LYOPHILIZED, FOR SOLUTION INTRAVENOUS at 04:19

## 2017-06-12 RX ADMIN — LEVETIRACETAM 750 MG: 100 SOLUTION ORAL at 21:34

## 2017-06-12 RX ADMIN — HYDROCODONE BITARTRATE AND ACETAMINOPHEN 1 TABLET: 7.5; 325 TABLET ORAL at 21:34

## 2017-06-12 RX ADMIN — SODIUM CHLORIDE 125 ML/HR: 900 INJECTION, SOLUTION INTRAVENOUS at 04:17

## 2017-06-12 RX ADMIN — CLONAZEPAM 0.5 MG: 0.5 TABLET ORAL at 09:06

## 2017-06-12 RX ADMIN — CARBAMAZEPINE 300 MG: 300 CAPSULE, EXTENDED RELEASE ORAL at 21:34

## 2017-06-12 RX ADMIN — PIPERACILLIN SODIUM,TAZOBACTAM SODIUM 3.38 G: 3; .375 INJECTION, POWDER, FOR SOLUTION INTRAVENOUS at 17:07

## 2017-06-12 RX ADMIN — VITAMIN D, TAB 1000IU (100/BT) 5000 UNITS: 25 TAB at 17:45

## 2017-06-12 RX ADMIN — LAMOTRIGINE 200 MG: 100 TABLET ORAL at 21:34

## 2017-06-12 RX ADMIN — IOPAMIDOL 80 ML: 755 INJECTION, SOLUTION INTRAVENOUS at 08:23

## 2017-06-12 RX ADMIN — Medication 5 ML: at 06:00

## 2017-06-12 RX ADMIN — Medication 10 ML: at 21:35

## 2017-06-12 RX ADMIN — Medication 10 ML: at 14:23

## 2017-06-12 NOTE — PROGRESS NOTES
Met with pt for initial d/c planning. Pt was most recently at [facility] 6/5-6/7/17 for cervical stenosis. Pt reportedly resides in a two story apartment with her fiancee and grandson. She is currently open with [facility] for PT and nursing; will need resumption orders prior to d/c. Pt owns a rw, bsc and shower chair. She has Rx coverage; pharmacy is [facility].  PCP is David See NP. [place] nurse navigator was notified of pt's hospital admission. Senait Tuttle LCSW    Care Management Interventions  PCP Verified by CM: Yes David See NP)  Palliative Care Consult (Criteria: CHF and RRAT>21): No  Current Support Network:  Other (Lives with Sonu Marquez and 15year old grandson)  Confirm Follow Up Transport: Friends  Plan discussed with Pt/Family/Caregiver: Yes  Discharge Location  Discharge Placement: Home with home health

## 2017-06-12 NOTE — PROGRESS NOTES
Ortho update:    S: Pt resting in bed without complaints. Patient has had similar left sided neck pain with remnants of radiculopathy from surgery. Pt is feeling better from when she first arrived in the ED. CTA chest: negative for PE  Prelim dopplers: negative for DVT    O: VSS: as of 12:00 pm 98.2, 85, 121/73  A and O x 3  Breathing even/ nonlabored  Collar intact. Dressing CDI without swelling. Strength 5/5 throughout BUE, BLE    A: Post op tachycardia, fevers/ chills; resolving this am s/p ACDF 1 week post-op  Likelihood for infection of cervical spine low; CT of soft tissue negative for fluid collection, no out of proportion neck pain. Due to no fluid collection noted, discussed with Dr. Fanta Parra, no need for MRI of cervical spine at this point. Recommend continuing IV abx  Start to mobilize with PT/ OT  Pulmonary toilet. Okay to eat  Will continue to follow along.     Donna Ambriz NP

## 2017-06-12 NOTE — ED NOTES
TRANSFER - OUT REPORT:    Verbal report given to receiving RN(name) on Tracy Spence  being transferred to 4th floor(unit) for routine progression of care       Report consisted of patients Situation, Background, Assessment and   Recommendations(SBAR). Information from the following report(s) SBAR, Kardex, ED Summary, [facility] and Recent Results was reviewed with the receiving nurse. Lines:   Peripheral IV 06/11/17 Right Antecubital (Active)   Site Assessment Clean, dry, & intact 6/11/2017 10:51 PM   Phlebitis Assessment 0 6/11/2017 10:51 PM   Infiltration Assessment 0 6/11/2017 10:51 PM   Dressing Status Clean, dry, & intact 6/11/2017 10:51 PM   Hub Color/Line Status Blue 6/11/2017 10:51 PM   Action Taken Blood drawn 6/11/2017 10:51 PM        Opportunity for questions and clarification was provided. All of pt's belongings, valuables, and medications were sent with patient.     Patient transported with:   RN

## 2017-06-12 NOTE — PROGRESS NOTES
Pharmacy requested to dose Vancomycin by Dr. Marty Ramirez for head and neck infection. I ordered 25 mg/kg dose (1750 mg) IV once. Also on Zosyn. CrCl of 65 ml/min. Will follow for further orders from admitting provider.

## 2017-06-12 NOTE — PROGRESS NOTES
[facility] Dosing Services: Antimicrobial Stewardship Progress Note    Consult for antibiotic dosing of vancomycin by Thu Cobos PA-C  Pharmacist reviewed antibiotic appropriateness for 46year old , female  for indication of Head and neck infection (post-op cervival infusion)  Day of Therapy 1    Plan:  Vancomycin therapy:  Start Vancomycin therapy, with loading dose of 1750 (mg) at 0300 6/12/17. Follow with maintenance dose of 1000(mg) at 1500 6/12/17, every 12 hours. Dose calculated to approximate a therapeutic trough of 15-20 mcg/mL. Last trough level / Plan for level:   Pharmacy to follow daily and will make changes to dose and/or frequency based on clinical status. Non-Kinetic Antimicrobial Dosing:   Current Regimen:    Recommendation:   Other Antimicrobial  (not dosed by pharmacist)   Zosyn   Cultures        Serum Creatinine     Lab Results   Component Value Date/Time    Creatinine 0.98 06/11/2017 10:34 PM       Creatinine Clearance Estimated Creatinine Clearance: 64.9 mL/min (based on Cr of 0.98).      Temp   99.6 °F (37.6 °C)    WBC   Lab Results   Component Value Date/Time    WBC 12.9 06/11/2017 10:34 PM       H/H   Lab Results   Component Value Date/Time    HGB 13.1 06/11/2017 10:34 PM        Platelets   Lab Results   Component Value Date/Time    PLATELET 791 79/79/5598 10:34 PM          Pharmacist: Paul information: [phone]

## 2017-06-12 NOTE — PROGRESS NOTES
Doing well this evening  Tolerating PO regular diet  Minimal neck pain, no arm pain  Ambulated to bathroom, no dysria    Patient Vitals for the past 12 hrs:   Temp Pulse Resp BP SpO2   06/12/17 1657 98 °F (36.7 °C) 79 15 119/73 97 %   06/12/17 1500 - 90 - - -   06/12/17 1205 98.2 °F (36.8 °C) 85 15 121/73 97 %   06/12/17 0757 97.8 °F (36.6 °C) 78 20 127/75 96 %   06/12/17 0700 - 81 - - -     Dressing is clean and dry, no swelling around neck or drainage  Motor and sensation normal RLE and LLE    CT-PA negative   Ultrasound negative    A/p: fever , now resolved, tachycardia now resolved, no findings of abscess, pneumonia, blood clots, PE, no sites of infection identified.  -monitor overnight tonight, work up any new fevers  -incentive spirometry every hour, patient not doing this at home and is common cause of postop fever  -mobilize  -change dressing again tomorrow at discharge to silver based dressing  -recheck CBC in am  -plan discharge home tomorrow if no events.

## 2017-06-12 NOTE — PROGRESS NOTES
SHIFT CHANGE:  1930 Bedside and Verbal shift change report given to Za VELEZ (oncoming nurse) by [facility] (offgoing nurse). Report included the following information SBAR, Kardex, MAR and Recent Results. SHIFT SUMMARY:  Patient had restful evening, no new issues to report. Norco given as prescribed for headache 8/10. Left ankle wrapped in cling gauze, patient ambulating well on left extremity. END OF SHIFT REPORT:  0730  Bedside and Verbal shift change report given to Tani Vu (oncoming nurse) by Tabitha Puri (offgoing nurse). Report included the following information SBAR, Kardex, MAR, Recent Results and Med Rec Status.

## 2017-06-12 NOTE — PROGRESS NOTES
0745: Patient need new IV. Orders for stat CTA. Patient hard stick. ICU nurse to come over and get with ultrasound. 1100: Patient requesting a diet. Order for NPO discontinued but no new diet order placed. Dianna VILLALOBOS paged. Daisha Butch on the floor. OK to start diet. 1715: Patient requesting restart of vitamin D 5000 units every evening. Notified family practice. Orders received to restart home medication. 1830: [place] [id number]. Orders for patient to use incentive spirometer 20x per hour. Patient educated. Return demonstrated IS use. Bedside shift change report given to Za VELEZ (oncoming nurse) by Logan Herrera RN (offgoing nurse). Report included the following information SBAR, Kardex, Intake/Output, MAR, Recent Results and Cardiac Rhythm NSR.

## 2017-06-12 NOTE — PROGRESS NOTES
ORTHO:    Discussed case with Dr. Fanta Parra this morning. He has high suspicion of PE with pt. Presentation. Requests order of STAT US of BLE's for DVT's and STAT CTA chest to eval for PE. Alethea Ernst PA-C  Orthopaedic Surgery [address]  Pgr.  [phone]

## 2017-06-12 NOTE — CONSULTS
[address] [address]   Office [phone]  Fax [phone]       Initial Consult Note     Name: Rk Chan MRN: [id number]  Sex: female    YOB: 1965  Age: 46 y.o. PCP: Roslyn Ceron NP     Date of admission:    6/11/2017  Date of consultation:   6/12/2017  Requesting physician:   Dr. Eddie Pineda  Reason for consultation:   Dr. Eddie Pineda    History of present illness  Rk Chan is a 46 y.o. female who is POD7 s/p C5-C7 cervical discectomy with fusion and instrumentation. She presents to the ED today complaining of fever/chills. CT of neck soft tissue was performed which demonstrated prevertebral swelling edema from C1-C5. Was also noted to have 4/4 SIRS criteria. Patient was admitted to the orthopedics service for sepsis 2/2 operative wound infection. Pt endores no recent increased in post-operative pain. Pt denies chest pain, shortness of breath, numbness/tingling, weakness, diarrhea/constipation. Home Medications   Prior to Admission medications    Medication Sig Start Date End Date Taking? Authorizing Provider   HYDROcodone-acetaminophen (NORCO) 7.5-325 mg per tablet Take 1 Tab by mouth every four (4) hours as needed. Max Daily Amount: 6 Tabs. 6/6/17   WILFREDO Lincoln   senna-docusate (PERICOLACE) 8.6-50 mg per tablet Take 1 Tab by mouth two (2) times a day. 6/6/17   WILFREDO Lincoln   Calcium Citrate-Vitamin D3 500 mg calcium -400 unit chew Take 1 tab twice daily to enhance dung fusion 6/6/17   WILFREDO Lincoln   cholecalciferol, VITAMIN D3, (VITAMIN D3) 5,000 unit tab tablet Take 5,000 Units by mouth daily. Historical Provider   carBAMazepine ER (CARBATROL ER) 300 mg capsule Take 300 mg by mouth two (2) times a day. Historical Provider   levETIRAcetam (KEPPRA) 750 mg tablet Take 750 mg by mouth two (2) times a day. Historical Provider   lamoTRIgine (LAMICTAL) 200 mg tablet Take 200 mg by mouth two (2) times a day. Historical Provider   clonazePAM (KLONOPIN) 0.5 mg tablet Take 0.5 mg by mouth three (3) times daily. Historical Provider       Allergies   Allergies   Allergen Reactions    Aspirin Other (comments)     Chest pain. Has taken ibuprofen ,diclofenac before    Codeine Other (comments)     Chest pain      Phenobarbital Other (comments)     hallucinate       Past Medical History   Past Medical History:   Diagnosis Date    Degenerative disc disease, cervical     C2 C3    Headache     Ill-defined condition     anxiety    Seizures (HCC)     daily petite mal seizure    UTI (urinary tract infection) 05/22/2017       Past Surgical History  Past Surgical History:   Procedure Laterality Date    HX ORTHOPAEDIC  2014    right knee       Family History  Family History   Problem Relation Age of Onset    Hypertension Father     Diabetes Maternal Grandmother     Emphysema Mother        Social History   Living arrangements: patient lives with their family. Ambulates: independently, does have walker and cane available. Alcohol history: Never    Smoking history: non-smoker    Illicit drug history: no history of illicit drug use    Review of Systems  Review of Systems   Constitutional: Positive for chills and fever. Respiratory: Negative for cough, chest tightness and shortness of breath. Cardiovascular: Negative for chest pain and palpitations. Gastrointestinal: Negative for abdominal distention, abdominal pain, constipation, diarrhea, nausea and vomiting. Genitourinary: Negative for dysuria. Musculoskeletal: Negative for arthralgias and myalgias. Skin: Negative for color change. Neurological: Negative for dizziness, seizures, syncope, weakness, light-headedness, numbness and headaches. All other systems reviewed and are negative. Physical Exam  Objective:  General Appearance: In no acute distress, in pain and uncomfortable (c-collar on neck, sitting upright and very still in bed.).     Vital signs: (most recent): Blood pressure 137/82, pulse (!) 105, temperature 99 °F (37.2 °C), resp. rate 22, height 5' 3\" (1.6 m), weight 200 lb 9.9 oz (91 kg), SpO2 93 %. Fever. Lungs:  Normal respiratory rate and normal effort. She is not in respiratory distress. Breath sounds clear to auscultation. No stridor. No wheezes, rales, rhonchi or decreased breath sounds. Heart: Normal rate. Regular rhythm. S1 normal and S2 normal.  No murmur, gallop or friction rub. Chest: Symmetric chest wall expansion. Extremities: There is no dependent edema. (Good ROM in bilateral upper extremities. Left ankle is wrapped and does have some increased swelling.)  Neurological: Patient is alert and oriented to person, place and time. Normal strength. Skin:  Warm and dry. Abdomen: Abdomen is soft and non-distended. Bowel sounds are normal.   There is no abdominal tenderness. There is no mass. Pupils:  Pupils are equal, round, and reactive to light. O2 Device: Room air     Laboratory Data  Recent Results (from the past 8 hour(s))   LACTIC ACID, PLASMA    Collection Time: 06/11/17 10:34 PM   Result Value Ref Range    Lactic acid 2.1 (HH) 0.4 - 2.0 MMOL/L   METABOLIC PANEL, COMPREHENSIVE    Collection Time: 06/11/17 10:34 PM   Result Value Ref Range    Sodium 139 136 - 145 mmol/L    Potassium 4.3 3.5 - 5.1 mmol/L    Chloride 102 97 - 108 mmol/L    CO2 29 21 - 32 mmol/L    Anion gap 8 5 - 15 mmol/L    Glucose 148 (H) 65 - 100 mg/dL    BUN 11 6 - 20 MG/DL    Creatinine 0.98 0.55 - 1.02 MG/DL    BUN/Creatinine ratio 11 (L) 12 - 20      GFR est AA >60 >60 ml/min/1.73m2    GFR est non-AA 60 (L) >60 ml/min/1.73m2    Calcium 10.4 (H) 8.5 - 10.1 MG/DL    Bilirubin, total 0.3 0.2 - 1.0 MG/DL    ALT (SGPT) 22 12 - 78 U/L    AST (SGOT) 25 15 - 37 U/L    Alk.  phosphatase 133 (H) 45 - 117 U/L    Protein, total 7.9 6.4 - 8.2 g/dL    Albumin 3.4 (L) 3.5 - 5.0 g/dL    Globulin 4.5 (H) 2.0 - 4.0 g/dL    A-G Ratio 0.8 (L) 1.1 - 2.2     CBC WITH AUTOMATED DIFF    Collection Time: 06/11/17 10:34 PM   Result Value Ref Range    WBC 12.9 (H) 3.6 - 11.0 K/uL    RBC 4.08 3.80 - 5.20 M/uL    HGB 13.1 11.5 - 16.0 g/dL    HCT 36.8 35.0 - 47.0 %    MCV 90.2 80.0 - 99.0 FL    MCH 32.1 26.0 - 34.0 PG    MCHC 35.6 30.0 - 36.5 g/dL    RDW 12.7 11.5 - 14.5 %    PLATELET 647 749 - 117 K/uL    NEUTROPHILS 82 (H) 32 - 75 %    LYMPHOCYTES 12 12 - 49 %    MONOCYTES 5 5 - 13 %    EOSINOPHILS 1 0 - 7 %    BASOPHILS 0 0 - 1 %    ABS. NEUTROPHILS 10.6 (H) 1.8 - 8.0 K/UL    ABS. LYMPHOCYTES 1.5 0.8 - 3.5 K/UL    ABS. MONOCYTES 0.7 0.0 - 1.0 K/UL    ABS. EOSINOPHILS 0.1 0.0 - 0.4 K/UL    ABS. BASOPHILS 0.0 0.0 - 0.1 K/UL   C REACTIVE PROTEIN, QT    Collection Time: 06/11/17 10:34 PM   Result Value Ref Range    C-Reactive protein 3.73 (H) <0.60 mg/dL   EKG, 12 LEAD, INITIAL    Collection Time: 06/11/17 10:40 PM   Result Value Ref Range    Ventricular Rate 132 BPM    Atrial Rate 132 BPM    P-R Interval 150 ms    QRS Duration 84 ms    Q-T Interval 276 ms    QTC Calculation (Bezet) 408 ms    Calculated P Axis 48 degrees    Calculated R Axis 27 degrees    Calculated T Axis 59 degrees    Diagnosis       ** Poor data quality, interpretation may be adversely affected  Sinus tachycardia  Possible Anterior infarct , age undetermined  Abnormal ECG  When compared with ECG of 23-MAY-2017 12:55,  Vent.  rate has increased BY  64 BPM     URINALYSIS W/ RFLX MICROSCOPIC    Collection Time: 06/11/17 10:49 PM   Result Value Ref Range    Color YELLOW/STRAW      Appearance CLOUDY (A) CLEAR      Specific gravity 1.015 1.003 - 1.030      pH (UA) 8.0 5.0 - 8.0      Protein NEGATIVE  NEG mg/dL    Glucose NEGATIVE  NEG mg/dL    Ketone NEGATIVE  NEG mg/dL    Bilirubin NEGATIVE  NEG      Blood NEGATIVE  NEG      Urobilinogen 0.2 0.2 - 1.0 EU/dL    Nitrites NEGATIVE  NEG      Leukocyte Esterase TRACE (A) NEG      WBC 0-4 0 - 4 /hpf    RBC 0-5 0 - 5 /hpf    Epithelial cells FEW FEW /lpf Bacteria NEGATIVE  NEG /hpf   SED RATE (ESR)    Collection Time: 06/12/17 12:36 AM   Result Value Ref Range    Sed rate, automated 44 (H) 0 - 30 mm/hr   LACTIC ACID, PLASMA    Collection Time: 06/12/17  2:04 AM   Result Value Ref Range    Lactic acid 1.2 0.4 - 2.0 MMOL/L     Imaging  CXR Results  (Last 48 hours)               06/11/17 2251  XR CHEST PORT Final result    Impression:  IMPRESSION:   No acute process. Narrative:  INDICATION:   Sepsis       EXAM:  AP CHEST RADIOGRAPH       COMPARISON: May 23, 2017       FINDINGS:       AP portable view of the chest demonstrates a normal cardiomediastinal   silhouette. The lungs are adequately expanded with no edema, effusion,   consolidation, or pneumothorax. Postsurgical changes cervical spine. CT Results  (Last 48 hours)               06/12/17 0005  CT NECK SOFT TISSUE W CONT Final result    Impression:  IMPRESSION:   Recent discectomy and fusion C5-7 with prevertebral edema/swelling from C1-C5. No drainable fluid collection. .           Narrative:  INDICATION:   recent surgery, fever       COMPARISON:  May 24, 2017       TECHNIQUE:  Axial neck CT was performed after the uneventful administration of   100 CC Isovue-300. Sagittal and coronal reconstructions were generated. CT dose reduction was achieved through use of a standardized protocol tailored   for this examination and automatic exposure control for dose modulation. FINDINGS:       There is extensive prevertebral soft tissue swelling, particularly from C1-C5. There is evidence of interval discectomy and anterior fusion at C5-C7. Interbody   grafts are appropriately located within the disc spaces. There is no drainable   fluid collection. The thyroid gland, submandibular glands and parotid glands are   normal. No lymphadenopathy. Lung apices are clear.                     Impression / Recommendations     Dawit Dennis is a 46 y.o. female who is POD7 s/p C5-C7 discectomy with fusion and instrumentation. The Family Medicine Service was consulted for medical management of sepsis, assistance with antibiotic management, and general medical management. POD7 s/p C5-C7 Discectomy with Fusion - presenting now with sepsis 2/2 soft tissue infection from surgical wound.  -Operative management, pain management, therapy services, and disposition planning per primary team.  -MRI Cervical Spine to be done. Sepsis 2/2 Soft Tissue Infection Likely Related to Surgical Wound - 4/4 SIRS on admission. Blood and urine cultures drawn. Started on IV vancomycin, zosyn. Given 30cc/kg NS bolus.  -Will transfer patient to telemetry floor for closer monitoring as the clinical picture here is sepsis 2/2 soft tissue infection.  -Agree with vancomycin, zosyn. Will continue these as initially ordered by primary team.  -Will increase IVF to 125cc/hr for maintenance rate. -F/u blood, urine cultures.  -Strict I&O. Seizure Disorder  -Resume home antiepileptics:  Carbamazepine, klonopin, Lamictal, keppra. FEN/GI - NPO per primary team.  IVF at 125cc/hr. Activity - Per primary team  DVT prophylaxis - Per primary team  GI prophylaxis - Per primary team.  Disposition - Plan to d/c to Per primary team.  Code Status - Full, discussed with patient / caregivers. Thank you very much for allowing us to participate in the care of this pleasant patient. The family medicine service will continue to follow the patient's medical progress along with you. Please do not hesitate to page with any questions or to discuss the case (pager # [phone]). Patient will be discussed with Dr. Alondra Hoskins    Signed by:      Hari Larson MD  Family Medicine Resident        For Billing    Chief Complaint   Patient presents with   [facility] Problems  Date Reviewed: 5/22/2017          Codes Class Noted POA    Sepsis [facility]) ICD-10-CM: A41.9  ICD-9-CM: 038.9, 995.91  6/12/2017 Unknown

## 2017-06-12 NOTE — PROCEDURES
[facility]  *** FINAL REPORT ***    Name: Henry Landrum  MRN: [id number]    Inpatient  : 1965  HIS Order #: [id number]  [address] [facility] Visit #: [id number]  Date: 2017    TYPE OF TEST: Peripheral Venous Testing    REASON FOR TEST  Suspected pulmonary embolism    Right Leg:-  Deep venous thrombosis:           No  Superficial venous thrombosis:    No  Deep venous insufficiency:        No  Superficial venous insufficiency: No    Left Leg:-  Deep venous thrombosis:           No  Superficial venous thrombosis:    No  Deep venous insufficiency:        No  Superficial venous insufficiency: No      INTERPRETATION/FINDINGS  PROCEDURE:  BILATERAL LE VENOUS DUPLEX. Evaluation of lower extremity veins with ultrasound (B-mode imaging,  pulsed Doppler, color Doppler). Includes the common femoral, deep  femoral, femoral, popliteal, posterior tibial, peroneal, and great  saphenous veins. FINDINGS:  Caffie Florentino scale and color flow duplex images of the veins in  both lower extremities demonstrate normal compressibility, spontaneous   and augmented flow profiles, and absence of filling defects  throughout the deep and superficial veins in both lower extremities. CONCLUSION:  Bilateral lower extremity venous duplex negative for deep   venous thrombosis or thrombophlebitis. ADDITIONAL COMMENTS    I have personally reviewed the data relevant to the interpretation of  this  study. TECHNOLOGIST: Ruslan Stone RDCS  Signed: 2017 08:59 AM    PHYSICIAN: Tiffanie Alvarenga.  Edis York MD  Signed: 2017 04:42 PM

## 2017-06-12 NOTE — PROGRESS NOTES
First notified of patient this am at 7:07 this am.  On hearing, i asked that an immediate CT-PA be performed as it hadn't been done to rule out PE given negative CT of neck for fluid collection, tachycardia, poor mobility and recent surgery. Patient seen and examined  Patient Vitals for the past 4 hrs:   Temp Pulse Resp BP SpO2   06/12/17 0757 97.8 °F (36.6 °C) 78 20 127/75 96 %   06/12/17 0700 - 81 - - -       Oriented x 4  No swelling  Talking normally  Reports minimal difficulty swallowing at home  Hasn't been that mobile  No arm pain  Minimal neck pain  Strong motor RUE/LUE/RLE/LLE    Currently afebrile, not tachycardic, did receive vanc/zosyn , no obvious source from neck but still feel high risk for DVT/PE or pulmonary source.  -discussed with patient indications for CT-Pa and ultrasound, agrees with pursuing it.  -will follow.    -

## 2017-06-12 NOTE — PROGRESS NOTES
[facility] FAMILY MEDICINE RESIDENCY PROGRAM   Senior Resident Consult Note    CC: Fever    HPI:  Justin Bautista is a 46 y.o. female who is Post op day 7 from C5-C7 discectomy with fusion and instrumentation secondary to disk herniation/DDD that presents to the ER complaining of Fever. Patient discharged from [facility] on 6/7/17 following procedure after uncomplicated post operative period. On 6/11 she developed fevers and chills in the morning and came to ER for evaluation as unable to control fever at home. Patient endorses Fevers and chills, patient denies chest pain, SOB, Nausea, vomiting. Patient reports no change in neck pain, no new difficulty with swallowing, new onset weakness in extremities. Vital signs significant for tachycardia and fever to 102.9F, WBC of 12.9, Lactic acid-2.1, CRP-3.73, sed rate-44. CT neck soft tissue w/ contrast showing prevertebral edema and swelling from C1-C5 without fluid collection. Patient is admitted for Sepsis secondary to likely surgical site infection. Chart reviewed. Visit Vitals    /82 (BP 1 Location: Left arm, BP Patient Position: At rest)    Pulse (!) 105    Temp 99 °F (37.2 °C)    Resp 22    Ht 5' 3\" (1.6 m)    Wt 200 lb 9.9 oz (91 kg)    SpO2 93%    BMI 35.54 kg/m2         A/P: Patient is a.age female with history of seizure disorder and DDD who is admitted for sepsis secondary to surgical site infection. We were consulted for medical management of Seizure disorder and for IV antibiotic recommendations. Sepsis secondary to Surgical site Infection: Patient POD#7 from C5-C7 Laminectomy, Fusion, and instrumentation. Patient with new onset fevers and soft tissue swelling near surgical site. - Vancomycin and zosyn  - MIVF  - MRI Cervical spine W/WO Contrast  - NPO except meds now per ortho    Seizure Disorder: Stable  - Continue Lamictal, Keppra, Carbmazepine    Patient seen, examined, and discussed with Dr. Cathie Montgomery (PGY-1).  For the remaining assessment and plan of other medical problems please refer to Dr. Melgar Bring note for more details.     Pt to be discussed with on-call attending physician    Dylan Christopher, MD  Family Medicine Resident

## 2017-06-12 NOTE — ED PROVIDER NOTES
HPI Comments: 46 y.o. female with past medical history significant for DDD, Seizures, Anxiety who presents from home with chief complaint of fever and chills. Pt is 6 days post-op cervical fusion. She reports an acute onset of chills since this morning. She has been unable to regulate temperature, \"I get cold and put on a blanket then I'm too hot\". She notes fever tmax \"102\" prior to arrival. Pt describes post-op pain as unchanged. She uses chloraseptic to help with difficulty swallowing food. Pt further notes pain to left ankle. Pain is mild. She has been taking her medications regularly including those for seizures. She denies cough, congestion, appetite change, SOB, dysuria, hematuria or any other acute medical concerns at this time. Chart review: C6/5/17 C5-C7 anterior cervical discectomy fusion s/p herniated disc. Frederick Barajas MD  PCP: Sharon Spear NP    Note written by Chris Galaviz, as dictated by Laura De La Cruz DO 10:32 PM    The history is provided by the patient. No  was used. Past Medical History:   Diagnosis Date    Degenerative disc disease, cervical     C2 C3    Headache     Ill-defined condition     anxiety    Seizures (HCC)     daily petite mal seizure    UTI (urinary tract infection) 05/22/2017       Past Surgical History:   Procedure Laterality Date    HX ORTHOPAEDIC  2014    right knee         Family History:   Problem Relation Age of Onset    Hypertension Father     Diabetes Maternal Grandmother     Emphysema Mother        Social History     Social History    Marital status: SINGLE     Spouse name: N/A    Number of children: N/A    Years of education: N/A     Occupational History    Not on file.      Social History Main Topics    Smoking status: Never Smoker    Smokeless tobacco: Never Used    Alcohol use No    Drug use: No    Sexual activity: No     Other Topics Concern    Not on file     Social History Narrative     ALLERGIES: Aspirin; Codeine; and Phenobarbital    Review of Systems   Constitutional: Positive for chills and fever (tmax \"102\"). Negative for appetite change and unexpected weight change. HENT: Negative for congestion, ear pain, hearing loss, rhinorrhea and trouble swallowing. Eyes: Negative for pain and visual disturbance. Respiratory: Negative for cough, chest tightness and shortness of breath. Cardiovascular: Negative for chest pain and palpitations. Gastrointestinal: Negative for abdominal distention, abdominal pain, blood in stool and vomiting. Genitourinary: Negative for dysuria, hematuria and urgency. Musculoskeletal: Negative for back pain and myalgias. + left ankle pain   Skin: Negative for rash. Neurological: Negative for dizziness, syncope, weakness and numbness. Psychiatric/Behavioral: Negative for confusion and suicidal ideas. All other systems reviewed and are negative. Vitals:    06/11/17 2213 06/11/17 2300 06/12/17 0030 06/12/17 0101   BP: (!) 195/102 (!) 158/101 (!) 182/94 163/76   Pulse: (!) 137 (!) 127 (!) 117 (!) 111   Resp: 18 19 18 20   Temp: (!) 102.9 °F (39.4 °C)   99.6 °F (37.6 °C)   SpO2: 95% 94% 94% 95%   Weight: 72.6 kg (160 lb)      Height: 5' 3\" (1.6 m)               Physical Exam   Constitutional: She is oriented to person, place, and time. She appears well-developed and well-nourished. No distress. HENT:   Head: Normocephalic and atraumatic. Right Ear: External ear normal.   Left Ear: External ear normal.   Nose: Nose normal.   Mouth/Throat: Oropharynx is clear and moist. No oropharyngeal exudate. Eyes: Conjunctivae and EOM are normal. Pupils are equal, round, and reactive to light. Right eye exhibits no discharge. Left eye exhibits no discharge. No scleral icterus. Neck: Neck supple. No JVD present. No tracheal deviation present. Pt is in an [place] collar. Surgical incision clean, dry and intact. No erythema. Cardiovascular: Regular rhythm, normal heart sounds and intact distal pulses. Tachycardia present. Exam reveals no gallop and no friction rub. No murmur heard. Pulmonary/Chest: Effort normal and breath sounds normal. No stridor. No respiratory distress. She has no decreased breath sounds. She has no wheezes. She has no rhonchi. She has no rales. She exhibits no tenderness. Abdominal: Soft. Bowel sounds are normal. She exhibits no distension. There is no tenderness. There is no rebound and no guarding. Musculoskeletal: Normal range of motion. She exhibits tenderness. She exhibits no edema. Left ankle: She exhibits swelling (minimal). Tenderness. Lateral malleolus tenderness found. Neurological: She is alert and oriented to person, place, and time. She has normal strength and normal reflexes. No cranial nerve deficit or sensory deficit. She exhibits normal muscle tone. Coordination normal. GCS eye subscore is 4. GCS verbal subscore is 5. GCS motor subscore is 6. Skin: Skin is warm and dry. No rash noted. She is not diaphoretic. No erythema. No pallor. Psychiatric: She has a normal mood and affect. Her behavior is normal. Judgment and thought content normal.   Nursing note and vitals reviewed.    Note written by Chris Moe, as dictated by Tammie Brown DO 10:35 PM    MDM  Number of Diagnoses or Management Options  Fever, unspecified fever cause:   Leukocytosis, unspecified type:   Sepsis, due to unspecified organism [facility]):      Amount and/or Complexity of Data Reviewed  Clinical lab tests: ordered and reviewed  Tests in the radiology section of CPT®: ordered and reviewed  Tests in the medicine section of CPT®: ordered and reviewed  Review and summarize past medical records: yes  Discuss the patient with other providers: yes (Ortho PA)  Independent visualization of images, tracings, or specimens: yes (ekg)    Risk of Complications, Morbidity, and/or Mortality  Presenting problems: moderate  Diagnostic procedures: moderate  Management options: moderate    Critical Care  Total time providing critical care: 30-74 minutes (45 minutes of CCT regardless of any procedures that might have been done.)    Patient Progress  Patient progress: stable    ED Course     CONSULT NOTE:  1:20 AM Mickey Johnson DO spoke with WILFREDO Self, Consult for Orthopedics. Discussed available diagnostic tests and clinical findings. [email or web address] will come to ED and further evaluate patient. 2:05 AM  Spoke with Ana Maria Hernandez in ED. Ortho will admit patient. Procedures   Chief Complaint   Patient presents with    Fever       2:26 AM  The patients presenting problems have been discussed, and they are in agreement with the care plan formulated and outlined with them. I have encouraged them to ask questions as they arise throughout their visit.     MEDICATIONS GIVEN:  Medications   sodium chloride (NS) flush 5-10 mL (not administered)   albuterol-ipratropium (DUO-NEB) 2.5 mg-0.5 mg/3 ml nebulizer solution (  Canceled Entry 6/11/17 0317)   piperacillin-tazobactam (ZOSYN) 3.375 g in 0.9% sodium chloride (MBP/ADV) 100 mL (3.375 g IntraVENous New Bag 6/12/17 0220)   vancomycin (VANCOCIN) 1,750 mg in 0.9% sodium chloride 500 mL IVPB (not administered)   0.9% sodium chloride infusion (not administered)   sodium chloride (NS) flush 5-10 mL (not administered)   sodium chloride (NS) flush 5-10 mL (not administered)   naloxone (NARCAN) injection 0.4 mg (not administered)   diphenhydrAMINE (BENADRYL) injection 25 mg (not administered)   ondansetron (ZOFRAN) 6 mg in 0.9% sodium chloride 50 mL IVPB (not administered)   senna-docusate (PERICOLACE) 8.6-50 mg per tablet 1 Tab (not administered)   HYDROcodone-acetaminophen (NORCO) 7.5-325 mg per tablet 1 Tab (not administered)   acetaminophen (TYLENOL) tablet 500 mg (not administered)   ondansetron (ZOFRAN) injection 6 mg (not administered)   sodium chloride 0.9 % bolus infusion 2,178 mL (0 mL/kg × 72.6 kg IntraVENous IV Completed 6/12/17 3740)   acetaminophen (TYLENOL) tablet 650 mg (650 mg Oral Given 6/11/17 1645)   iopamidol (ISOVUE 300) 61 % contrast injection 100 mL (100 mL IntraVENous Given 6/11/17 7415)       LABS REVIEWED:  Recent Results (from the past 24 hour(s))   LACTIC ACID, PLASMA    Collection Time: 06/11/17 10:34 PM   Result Value Ref Range    Lactic acid 2.1 (HH) 0.4 - 2.0 MMOL/L   METABOLIC PANEL, COMPREHENSIVE    Collection Time: 06/11/17 10:34 PM   Result Value Ref Range    Sodium 139 136 - 145 mmol/L    Potassium 4.3 3.5 - 5.1 mmol/L    Chloride 102 97 - 108 mmol/L    CO2 29 21 - 32 mmol/L    Anion gap 8 5 - 15 mmol/L    Glucose 148 (H) 65 - 100 mg/dL    BUN 11 6 - 20 MG/DL    Creatinine 0.98 0.55 - 1.02 MG/DL    BUN/Creatinine ratio 11 (L) 12 - 20      GFR est AA >60 >60 ml/min/1.73m2    GFR est non-AA 60 (L) >60 ml/min/1.73m2    Calcium 10.4 (H) 8.5 - 10.1 MG/DL    Bilirubin, total 0.3 0.2 - 1.0 MG/DL    ALT (SGPT) 22 12 - 78 U/L    AST (SGOT) 25 15 - 37 U/L    Alk. phosphatase 133 (H) 45 - 117 U/L    Protein, total 7.9 6.4 - 8.2 g/dL    Albumin 3.4 (L) 3.5 - 5.0 g/dL    Globulin 4.5 (H) 2.0 - 4.0 g/dL    A-G Ratio 0.8 (L) 1.1 - 2.2     CBC WITH AUTOMATED DIFF    Collection Time: 06/11/17 10:34 PM   Result Value Ref Range    WBC 12.9 (H) 3.6 - 11.0 K/uL    RBC 4.08 3.80 - 5.20 M/uL    HGB 13.1 11.5 - 16.0 g/dL    HCT 36.8 35.0 - 47.0 %    MCV 90.2 80.0 - 99.0 FL    MCH 32.1 26.0 - 34.0 PG    MCHC 35.6 30.0 - 36.5 g/dL    RDW 12.7 11.5 - 14.5 %    PLATELET 363 662 - 256 K/uL    NEUTROPHILS 82 (H) 32 - 75 %    LYMPHOCYTES 12 12 - 49 %    MONOCYTES 5 5 - 13 %    EOSINOPHILS 1 0 - 7 %    BASOPHILS 0 0 - 1 %    ABS. NEUTROPHILS 10.6 (H) 1.8 - 8.0 K/UL    ABS. LYMPHOCYTES 1.5 0.8 - 3.5 K/UL    ABS. MONOCYTES 0.7 0.0 - 1.0 K/UL    ABS. EOSINOPHILS 0.1 0.0 - 0.4 K/UL    ABS.  BASOPHILS 0.0 0.0 - 0.1 K/UL   C REACTIVE PROTEIN, QT    Collection Time: 06/11/17 10:34 PM Result Value Ref Range    C-Reactive protein 3.73 (H) <0.60 mg/dL   EKG, 12 LEAD, INITIAL    Collection Time: 06/11/17 10:40 PM   Result Value Ref Range    Ventricular Rate 132 BPM    Atrial Rate 132 BPM    P-R Interval 150 ms    QRS Duration 84 ms    Q-T Interval 276 ms    QTC Calculation (Bezet) 408 ms    Calculated P Axis 48 degrees    Calculated R Axis 27 degrees    Calculated T Axis 59 degrees    Diagnosis       ** Poor data quality, interpretation may be adversely affected  Sinus tachycardia  Possible Anterior infarct , age undetermined  Abnormal ECG  When compared with ECG of 23-MAY-2017 12:55,  Vent.  rate has increased BY  64 BPM     URINALYSIS W/ RFLX MICROSCOPIC    Collection Time: 06/11/17 10:49 PM   Result Value Ref Range    Color YELLOW/STRAW      Appearance CLOUDY (A) CLEAR      Specific gravity 1.015 1.003 - 1.030      pH (UA) 8.0 5.0 - 8.0      Protein NEGATIVE  NEG mg/dL    Glucose NEGATIVE  NEG mg/dL    Ketone NEGATIVE  NEG mg/dL    Bilirubin NEGATIVE  NEG      Blood NEGATIVE  NEG      Urobilinogen 0.2 0.2 - 1.0 EU/dL    Nitrites NEGATIVE  NEG      Leukocyte Esterase TRACE (A) NEG      WBC 0-4 0 - 4 /hpf    RBC 0-5 0 - 5 /hpf    Epithelial cells FEW FEW /lpf    Bacteria NEGATIVE  NEG /hpf   SED RATE (ESR)    Collection Time: 06/12/17 12:36 AM   Result Value Ref Range    Sed rate, automated 44 (H) 0 - 30 mm/hr       VITAL SIGNS:  Patient Vitals for the past 12 hrs:   Temp Pulse Resp BP SpO2   06/12/17 0101 99.6 °F (37.6 °C) (!) 111 20 163/76 95 %   06/12/17 0030 - (!) 117 18 (!) 182/94 94 %   06/11/17 2300 - (!) 127 19 (!) 158/101 94 %   06/11/17 2213 (!) 102.9 °F (39.4 °C) (!) 137 18 (!) 195/102 95 %       RADIOLOGY RESULTS:  The following have been ordered and reviewed:  XR ANKLE LT MIN 3 V   Final Result      CT NECK SOFT TISSUE W CONT   Final Result      XR CHEST PORT   Final Result      MRI CERV SPINE W WO CONT    (Results Pending)     Study Result      INDICATION: recent surgery, fever     COMPARISON: May 24, 2017     TECHNIQUE: Axial neck CT was performed after the uneventful administration of  100 CC Isovue-300. Sagittal and coronal reconstructions were generated.      CT dose reduction was achieved through use of a standardized protocol tailored  for this examination and automatic exposure control for dose modulation.     FINDINGS:     There is extensive prevertebral soft tissue swelling, particularly from C1-C5. There is evidence of interval discectomy and anterior fusion at C5-C7. Interbody  grafts are appropriately located within the disc spaces. There is no drainable  fluid collection. The thyroid gland, submandibular glands and parotid glands are  normal. No lymphadenopathy. Lung apices are clear.     IMPRESSION  IMPRESSION:  Recent discectomy and fusion C5-7 with prevertebral edema/swelling from C1-C5. No drainable fluid collection. .     Study Result      INDICATION: Sepsis     EXAM: AP CHEST RADIOGRAPH     COMPARISON: May 23, 2017     FINDINGS:     AP portable view of the chest demonstrates a normal cardiomediastinal  silhouette. The lungs are adequately expanded with no edema, effusion,  consolidation, or pneumothorax. Postsurgical changes cervical spine.     IMPRESSION  IMPRESSION:  No acute process. Study Result      INDICATION: pain, swelling     EXAM: ANKLE 3 VIEW     COMPARISON: None     FINDINGS:  3 views of the left ankle demonstrate no acute fracture or subluxation. Medial  soft tissue swelling. The ankle mortise is intact.     IMPRESSION  IMPRESSION:  No acute fracture. ED EKG interpretation:  Rhythm: sinus tachycardia; and regular . Rate (approx.): 132; Axis: normal; P wave: normal; QRS interval: normal ; ST/T wave: normal; Other findings: abnormal ekg. This EKG was interpreted by Dez Abebe DO,ED Provider. CONSULTATIONS:   Ortho    PROGRESS NOTES:  Discussed results and plan with patient.  Patient will be admitted/observed for further evaluation and treatment. DIAGNOSIS:    1. Fever, unspecified fever cause    2. Leukocytosis, unspecified type    3. Sepsis, due to unspecified organism [facility])        PLAN:  Admit/obs      ED COURSE: The patients hospital course has been uncomplicated.

## 2017-06-12 NOTE — PROGRESS NOTES
SHIFT CHANGE:  1930 Bedside and Verbal shift change report given to Za VELEZ (oncoming nurse) by Erlinda Michel (offgoing nurse). Report included the following information SBAR, Kardex, MAR and Recent Results. SHIFT SUMMARY:  [id number]  Left IV removed per patient request, right IV still in place. Patients VS stable overnight, no new issues to report. END OF SHIFT REPORT:  0730  Bedside and Verbal shift change report given to Pedro Stern (oncoming nurse) by Camille Metcalf (offgoing nurse). Report included the following information SBAR, Kardex, MAR and Recent Results.

## 2017-06-12 NOTE — H&P
Orthopaedic PRE-OP Admission History and Physical    Patient: Arlen Morrow MRN: [id number]  SSN: [id number]    YOB: 1965  Age: 46 y.o. Sex: female            Subjective:   Patient is a 46 y.o.  female who presents with history of recent ACDF C5-7 with Dr. Omari White x 6 days ago. C/o fever and chills at home x 48hrs, worse tonight. Denies worsening neck pain post op, denies radiating symptoms. Pain improves with Pain meds. ER workup reveals SIRS criteria. WBC 13, ESR 44, CRP 3.73, , Lactic acid 2.1. No source detected. BC x 2 sent to lab. The patient was evaluated and determined the most appropriate plan of care is to admit for IV abx and further workup. Patient denies chest pain, tightness, pain radiating down left arm, palpitations, dizziness, lightheadedness, fevers, chills. Patient denies shortness of breath, wheezing, diarrhea, constipation, abdominal pain. Patient denies urinary problems, dysuria, hesitancy, urgency. Denies skin breakdown, rashes, insect bites or open area. PCP is Dr. Catherine Vo. Pt. Is a nonsmoker. Patient Active Problem List    Diagnosis Date Noted    Sepsis ([facility] [address] 06/12/2017    Cervical stenosis of spine 06/05/2017    Convulsion ([facility] [address] 06/01/2015     Past Medical History:   Diagnosis Date    Degenerative disc disease, cervical     C2 C3    Headache     Ill-defined condition     anxiety    Seizures (HCC)     daily petite mal seizure    UTI (urinary tract infection) 05/22/2017      Past Surgical History:   Procedure Laterality Date    HX ORTHOPAEDIC  2014    right knee      Prior to Admission medications    Medication Sig Start Date End Date Taking? Authorizing Provider   HYDROcodone-acetaminophen (NORCO) 7.5-325 mg per tablet Take 1 Tab by mouth every four (4) hours as needed. Max Daily Amount: 6 Tabs. 6/6/17   WILFREDO Lincoln   senna-docusate (PERICOLACE) 8.6-50 mg per tablet Take 1 Tab by mouth two (2) times a day.  6/6/17   Mg Melaniek WILFREDO Aleman   Calcium Citrate-Vitamin D3 500 mg calcium -400 unit chew Take 1 tab twice daily to enhance dung fusion 6/6/17   WILFREDO Lincoln   cholecalciferol, VITAMIN D3, (VITAMIN D3) 5,000 unit tab tablet Take 5,000 Units by mouth daily. Historical Provider   carBAMazepine ER (CARBATROL ER) 300 mg capsule Take 300 mg by mouth two (2) times a day. Historical Provider   levETIRAcetam (KEPPRA) 750 mg tablet Take 750 mg by mouth two (2) times a day. Historical Provider   lamoTRIgine (LAMICTAL) 200 mg tablet Take 200 mg by mouth two (2) times a day. Historical Provider   clonazePAM (KLONOPIN) 0.5 mg tablet Take 0.5 mg by mouth three (3) times daily.     Historical Provider     Current Facility-Administered Medications   Medication Dose Route Frequency    piperacillin-tazobactam (ZOSYN) 3.375 g in 0.9% sodium chloride (MBP/ADV) 100 mL  3.375 g IntraVENous NOW    vancomycin (VANCOCIN) 1,750 mg in 0.9% sodium chloride 500 mL IVPB  1,750 mg IntraVENous ONCE    0.9% sodium chloride infusion  75 mL/hr IntraVENous CONTINUOUS    sodium chloride (NS) flush 5-10 mL  5-10 mL IntraVENous Q8H    sodium chloride (NS) flush 5-10 mL  5-10 mL IntraVENous PRN    naloxone (NARCAN) injection 0.4 mg  0.4 mg IntraVENous PRN    diphenhydrAMINE (BENADRYL) injection 25 mg  25 mg IntraVENous Q4H PRN    senna-docusate (PERICOLACE) 8.6-50 mg per tablet 1 Tab  1 Tab Oral DAILY    HYDROcodone-acetaminophen (NORCO) 7.5-325 mg per tablet 1 Tab  1 Tab Oral Q4H PRN    acetaminophen (TYLENOL) tablet 500 mg  500 mg Oral Q6H PRN    ondansetron (ZOFRAN) injection 6 mg  6 mg IntraVENous Q6H PRN    piperacillin-tazobactam (ZOSYN) 3.375 g in 0.9% sodium chloride (MBP/ADV) 100 mL  3.375 g IntraVENous Q8H    vancomycin (VANCOCIN) 1,000 mg in 0.9% sodium chloride (MBP/ADV) 250 mL  1 g IntraVENous Q12H    sodium chloride (NS) flush 5-10 mL  5-10 mL IntraVENous PRN    albuterol-ipratropium (DUO-NEB) 2.5 mg-0.5 mg/3 ml nebulizer solution Current Outpatient Prescriptions   Medication Sig    HYDROcodone-acetaminophen (NORCO) 7.5-325 mg per tablet Take 1 Tab by mouth every four (4) hours as needed. Max Daily Amount: 6 Tabs.  senna-docusate (PERICOLACE) 8.6-50 mg per tablet Take 1 Tab by mouth two (2) times a day.  Calcium Citrate-Vitamin D3 500 mg calcium -400 unit chew Take 1 tab twice daily to enhance dung fusion    cholecalciferol, VITAMIN D3, (VITAMIN D3) 5,000 unit tab tablet Take 5,000 Units by mouth daily.  carBAMazepine ER (CARBATROL ER) 300 mg capsule Take 300 mg by mouth two (2) times a day.  levETIRAcetam (KEPPRA) 750 mg tablet Take 750 mg by mouth two (2) times a day.  lamoTRIgine (LAMICTAL) 200 mg tablet Take 200 mg by mouth two (2) times a day.  clonazePAM (KLONOPIN) 0.5 mg tablet Take 0.5 mg by mouth three (3) times daily. Allergies   Allergen Reactions    Aspirin Other (comments)     Chest pain. Has taken ibuprofen ,diclofenac before    Codeine Other (comments)     Chest pain      Phenobarbital Other (comments)     hallucinate      Social History   Substance Use Topics    Smoking status: Never Smoker    Smokeless tobacco: Never Used    Alcohol use No      Family History   Problem Relation Age of Onset    Hypertension Father     Diabetes Maternal Grandmother     Emphysema Mother         Review of Systems  A comprehensive review of systems was negative except for that written in the HPI.         Objective:   Patient Vitals for the past 8 hrs:   BP Temp Pulse Resp SpO2 Height Weight   17 0101 163/76 99.6 °F (37.6 °C) (!) 111 20 95 % - -   17 0030 (!) 182/94 - (!) 117 18 94 % - -   17 2300 (!) 158/101 - (!) 127 19 94 % - -   17 2213 (!) 195/102 (!) 102.9 °F (39.4 °C) (!) 137 18 95 % 5' 3\" (1.6 m) 72.6 kg (160 lb)     Temp (24hrs), Av.3 °F (38.5 °C), Min:99.6 °F (37.6 °C), Max:102.9 °F (39.4 °C)      Gen: Well-developed,  in no acute distress     Neck: Supple, without masses, thyroid non-tender     Abd: Soft, non-tender, non-distended, normoactive bowel sounds are present, no palpable organomegaly and no detectable hernias   Lymph: No cervical or inguinal adenopathy   Musc: I examined pt's C spine. No Spinous process pain. No paraspinous process pain on exam. Strength 5/5 BLE's, DTR 2+ BUE's, +Dorsi/plantar flexion BLE's. BUE strength 5/5. NVI distally BUE's. Cap. Refill <2secs all fingers. Skin: No skin breakdown noted. Skin warm, pink, dry. No rashes. Anterior Cervical incision site without erythema, no drainage, no fluctuance. Steristrips intact. Neuro: Cranial nerves are grossly intact, no focal motor weakness, follows commands appropriately   Psych: Good insight, oriented to person, place and time, alert    INDICATION: recent surgery, fever     COMPARISON: May 24, 2017     TECHNIQUE: Axial neck CT was performed after the uneventful administration of  100 CC Isovue-300. Sagittal and coronal reconstructions were generated.      CT dose reduction was achieved through use of a standardized protocol tailored  for this examination and automatic exposure control for dose modulation.     FINDINGS:     There is extensive prevertebral soft tissue swelling, particularly from C1-C5. There is evidence of interval discectomy and anterior fusion at C5-C7. Interbody  grafts are appropriately located within the disc spaces. There is no drainable  fluid collection. The thyroid gland, submandibular glands and parotid glands are  normal. No lymphadenopathy. Lung apices are clear.     IMPRESSION  IMPRESSION:  Recent discectomy and fusion C5-7 with prevertebral edema/swelling from C1-C5. No drainable fluid collection. .    Labs:   Recent Results (from the past 24 hour(s))   LACTIC ACID, PLASMA    Collection Time: 06/11/17 10:34 PM   Result Value Ref Range    Lactic acid 2.1 (HH) 0.4 - 2.0 MMOL/L   METABOLIC PANEL, COMPREHENSIVE    Collection Time: 06/11/17 10:34 PM   Result Value Ref Range    Sodium 139 136 - 145 mmol/L    Potassium 4.3 3.5 - 5.1 mmol/L    Chloride 102 97 - 108 mmol/L    CO2 29 21 - 32 mmol/L    Anion gap 8 5 - 15 mmol/L    Glucose 148 (H) 65 - 100 mg/dL    BUN 11 6 - 20 MG/DL    Creatinine 0.98 0.55 - 1.02 MG/DL    BUN/Creatinine ratio 11 (L) 12 - 20      GFR est AA >60 >60 ml/min/1.73m2    GFR est non-AA 60 (L) >60 ml/min/1.73m2    Calcium 10.4 (H) 8.5 - 10.1 MG/DL    Bilirubin, total 0.3 0.2 - 1.0 MG/DL    ALT (SGPT) 22 12 - 78 U/L    AST (SGOT) 25 15 - 37 U/L    Alk. phosphatase 133 (H) 45 - 117 U/L    Protein, total 7.9 6.4 - 8.2 g/dL    Albumin 3.4 (L) 3.5 - 5.0 g/dL    Globulin 4.5 (H) 2.0 - 4.0 g/dL    A-G Ratio 0.8 (L) 1.1 - 2.2     CBC WITH AUTOMATED DIFF    Collection Time: 06/11/17 10:34 PM   Result Value Ref Range    WBC 12.9 (H) 3.6 - 11.0 K/uL    RBC 4.08 3.80 - 5.20 M/uL    HGB 13.1 11.5 - 16.0 g/dL    HCT 36.8 35.0 - 47.0 %    MCV 90.2 80.0 - 99.0 FL    MCH 32.1 26.0 - 34.0 PG    MCHC 35.6 30.0 - 36.5 g/dL    RDW 12.7 11.5 - 14.5 %    PLATELET 657 893 - 933 K/uL    NEUTROPHILS 82 (H) 32 - 75 %    LYMPHOCYTES 12 12 - 49 %    MONOCYTES 5 5 - 13 %    EOSINOPHILS 1 0 - 7 %    BASOPHILS 0 0 - 1 %    ABS. NEUTROPHILS 10.6 (H) 1.8 - 8.0 K/UL    ABS. LYMPHOCYTES 1.5 0.8 - 3.5 K/UL    ABS. MONOCYTES 0.7 0.0 - 1.0 K/UL    ABS. EOSINOPHILS 0.1 0.0 - 0.4 K/UL    ABS.  BASOPHILS 0.0 0.0 - 0.1 K/UL   C REACTIVE PROTEIN, QT    Collection Time: 06/11/17 10:34 PM   Result Value Ref Range    C-Reactive protein 3.73 (H) <0.60 mg/dL   EKG, 12 LEAD, INITIAL    Collection Time: 06/11/17 10:40 PM   Result Value Ref Range    Ventricular Rate 132 BPM    Atrial Rate 132 BPM    P-R Interval 150 ms    QRS Duration 84 ms    Q-T Interval 276 ms    QTC Calculation (Bezet) 408 ms    Calculated P Axis 48 degrees    Calculated R Axis 27 degrees    Calculated T Axis 59 degrees    Diagnosis       ** Poor data quality, interpretation may be adversely affected  Sinus tachycardia  Possible Anterior infarct , age undetermined  Abnormal ECG  When compared with ECG of 23-MAY-2017 12:55,  Vent. rate has increased BY  64 BPM     URINALYSIS W/ RFLX MICROSCOPIC    Collection Time: 06/11/17 10:49 PM   Result Value Ref Range    Color YELLOW/STRAW      Appearance CLOUDY (A) CLEAR      Specific gravity 1.015 1.003 - 1.030      pH (UA) 8.0 5.0 - 8.0      Protein NEGATIVE  NEG mg/dL    Glucose NEGATIVE  NEG mg/dL    Ketone NEGATIVE  NEG mg/dL    Bilirubin NEGATIVE  NEG      Blood NEGATIVE  NEG      Urobilinogen 0.2 0.2 - 1.0 EU/dL    Nitrites NEGATIVE  NEG      Leukocyte Esterase TRACE (A) NEG      WBC 0-4 0 - 4 /hpf    RBC 0-5 0 - 5 /hpf    Epithelial cells FEW FEW /lpf    Bacteria NEGATIVE  NEG /hpf   SED RATE (ESR)    Collection Time: 06/12/17 12:36 AM   Result Value Ref Range    Sed rate, automated 44 (H) 0 - 30 mm/hr   LACTIC ACID, PLASMA    Collection Time: 06/12/17  2:04 AM   Result Value Ref Range    Lactic acid 1.2 0.4 - 2.0 MMOL/L       Assessment:     Patient Active Problem List    Diagnosis Date Noted    Sepsis ([facility] [address] 06/12/2017    Cervical stenosis of spine 06/05/2017    Convulsion ([facility] [address] 06/01/2015         Plan:   Concern for early soft tissue infection of the neck post operative. CT of soft tissue shows edema but no fluid collection. Will obtain MRI this AM with and without contrast of the C-Spine for further evaluation. Consult FP for Medical Management and IV abx. Appreciate any further Medical workup and recommendations. NPO for possible surgery today, No chemical DVT prophylaxis today in anticipation of possible surgery. May take meds with sips of Water. Dr. Kamila Thompson agrees with plan. Warren Huizar PA-C  Orthopaedic Surgery [address]  [address]  [address]

## 2017-06-12 NOTE — PROGRESS NOTES
0330  Primary Nurse Janet Bobo, RN and Analy Liz RN performed a dual skin assessment on this patient Impairment noted- see wound doc flow sheet  Kalia score is 20

## 2017-06-12 NOTE — PROGRESS NOTES
[facility]: MED RECONCILIATION    Comments/Recommendations:    Patient awake, alert and oriented medications   Patient confirmed allergies.  Patient reported missing medication doses roughly once per week.  Patient reports taking medications at specified dosing times. Times updated in PTA list.   Patient reports taking hydrocodone-APAP less than prescribed, roughly once daily at night.  Patient reports switching to liquid formulation of levetiracetam after surgery since pills were to hard to swallow. Is taking oral tablets of other medications.  Patient reports having a prescription for senna-docusate, but was not able to get it since her surgery since the insurance would not cover it. Counseled patient to discuss with her pharmacist for over the counter options.  Patient confirmed preferred pharmacy. Medications added:   · none    Medications removed:  · Calcium citrate- Vitamin D3 500 mg calcium-400 unit chew 1 tab twice daily to enhance dung fusion  · Senna-docusate 8.6-50 mg tablets 1 tablet by mouth 2 times daily    Medications adjusted:  · Added dosing times to medications  · Levetiracetam 750 mg tablet 1 tablet twice daily to liquid formulation    Information obtained from: Patient, Rx Query    Allergies: Aspirin; Codeine; and Phenobarbital    Prior to Admission Medications:   Prior to Admission Medications   Prescriptions Last Dose Informant Patient Reported? Taking? HYDROcodone-acetaminophen (NORCO) 7.5-325 mg per tablet 6/11/2017 at 2000 Self No Yes   Sig: Take 1 Tab by mouth every four (4) hours as needed. Max Daily Amount: 6 Tabs. carBAMazepine ER (CARBATROL ER) 300 mg capsule 6/11/2017 at 2100 Self Yes Yes   Sig: Take 300 mg by mouth two (2) times a day. 0900 and 2100   cholecalciferol, VITAMIN D3, (VITAMIN D3) 5,000 unit tab tablet 6/11/2017 at 1700 Self Yes Yes   Sig: Take 5,000 Units by mouth every evening.  1700   clonazePAM (KLONOPIN) 0.5 mg tablet 6/11/2017 at 2100 Self Yes Yes Sig: Take 0.5 mg by mouth three (3) times daily. 0900, 1700, 2100   lamoTRIgine (LAMICTAL) 200 mg tablet 6/11/2017 at 2100 Self Yes Yes   Sig: Take 200 mg by mouth two (2) times a day. 0900 and 2100   levETIRAcetam (KEPPRA) 100 mg/mL solution 6/11/2017 at 2100 Self Yes Yes   Sig: Take 750 mg by mouth two (2) times a day.  0900 and 2100      Facility-Administered Medications: None     Thank you,    Nicola Alcala, Pharmacy Student, Class of [employer]

## 2017-06-12 NOTE — ED TRIAGE NOTES
Pt. C/o fever and chills since today. Had neck surgery here on Monday. Temp 102F at home. Feeling dehydrated.

## 2017-06-13 VITALS
HEIGHT: 63 IN | SYSTOLIC BLOOD PRESSURE: 124 MMHG | RESPIRATION RATE: 18 BRPM | WEIGHT: 202.82 LBS | OXYGEN SATURATION: 98 % | TEMPERATURE: 97.7 F | DIASTOLIC BLOOD PRESSURE: 75 MMHG | BODY MASS INDEX: 35.94 KG/M2 | HEART RATE: 78 BPM

## 2017-06-13 LAB
ANION GAP BLD CALC-SCNC: 7 MMOL/L (ref 5–15)
BACTERIA SPEC CULT: NORMAL
BASOPHILS # BLD AUTO: 0 K/UL (ref 0–0.1)
BASOPHILS # BLD: 0 % (ref 0–1)
BUN SERPL-MCNC: 8 MG/DL (ref 6–20)
BUN/CREAT SERPL: 10 (ref 12–20)
CALCIUM SERPL-MCNC: 9.2 MG/DL (ref 8.5–10.1)
CC UR VC: NORMAL
CHLORIDE SERPL-SCNC: 106 MMOL/L (ref 97–108)
CO2 SERPL-SCNC: 28 MMOL/L (ref 21–32)
CREAT SERPL-MCNC: 0.8 MG/DL (ref 0.55–1.02)
EOSINOPHIL # BLD: 0.1 K/UL (ref 0–0.4)
EOSINOPHIL NFR BLD: 2 % (ref 0–7)
ERYTHROCYTE [DISTWIDTH] IN BLOOD BY AUTOMATED COUNT: 12.5 % (ref 11.5–14.5)
GLUCOSE SERPL-MCNC: 100 MG/DL (ref 65–100)
HCT VFR BLD AUTO: 31.4 % (ref 35–47)
HGB BLD-MCNC: 10.6 G/DL (ref 11.5–16)
LAMOTRIGINE SERPL-MCNC: 5 UG/ML (ref 2–20)
LEVETIRACETAM SERPL-MCNC: 3.4 UG/ML (ref 10–40)
LYMPHOCYTES # BLD AUTO: 33 % (ref 12–49)
LYMPHOCYTES # BLD: 2 K/UL (ref 0.8–3.5)
MCH RBC QN AUTO: 30.8 PG (ref 26–34)
MCHC RBC AUTO-ENTMCNC: 33.8 G/DL (ref 30–36.5)
MCV RBC AUTO: 91.3 FL (ref 80–99)
MONOCYTES # BLD: 0.5 K/UL (ref 0–1)
MONOCYTES NFR BLD AUTO: 8 % (ref 5–13)
NEUTS SEG # BLD: 3.4 K/UL (ref 1.8–8)
NEUTS SEG NFR BLD AUTO: 57 % (ref 32–75)
PLATELET # BLD AUTO: 166 K/UL (ref 150–400)
POTASSIUM SERPL-SCNC: 4 MMOL/L (ref 3.5–5.1)
RBC # BLD AUTO: 3.44 M/UL (ref 3.8–5.2)
SERVICE CMNT-IMP: NORMAL
SODIUM SERPL-SCNC: 141 MMOL/L (ref 136–145)
WBC # BLD AUTO: 6 K/UL (ref 3.6–11)

## 2017-06-13 PROCEDURE — 74011250637 HC RX REV CODE- 250/637: Performed by: PHYSICIAN ASSISTANT

## 2017-06-13 PROCEDURE — 85025 COMPLETE CBC W/AUTO DIFF WBC: CPT | Performed by: ORTHOPAEDIC SURGERY

## 2017-06-13 PROCEDURE — 74011250636 HC RX REV CODE- 250/636: Performed by: PHYSICIAN ASSISTANT

## 2017-06-13 PROCEDURE — 77030012935 HC DRSG AQUACEL BMS -B

## 2017-06-13 PROCEDURE — 77030032490 HC SLV COMPR SCD KNE COVD -B

## 2017-06-13 PROCEDURE — 36415 COLL VENOUS BLD VENIPUNCTURE: CPT | Performed by: ORTHOPAEDIC SURGERY

## 2017-06-13 PROCEDURE — 80048 BASIC METABOLIC PNL TOTAL CA: CPT | Performed by: ORTHOPAEDIC SURGERY

## 2017-06-13 PROCEDURE — 77030012893

## 2017-06-13 PROCEDURE — 74011000258 HC RX REV CODE- 258: Performed by: PHYSICIAN ASSISTANT

## 2017-06-13 RX ORDER — HYDROCODONE BITARTRATE AND ACETAMINOPHEN 7.5; 325 MG/1; MG/1
1 TABLET ORAL
Qty: 60 TAB | Refills: 0 | Status: SHIPPED | OUTPATIENT
Start: 2017-06-13 | End: 2019-08-14

## 2017-06-13 RX ORDER — PHENOL/SODIUM PHENOLATE
20 AEROSOL, SPRAY (ML) MUCOUS MEMBRANE DAILY
Qty: 30 TAB | Refills: 1 | Status: SHIPPED | OUTPATIENT
Start: 2017-06-13 | End: 2019-08-14

## 2017-06-13 RX ORDER — POLYETHYLENE GLYCOL 3350 17 G/17G
17 POWDER, FOR SOLUTION ORAL
Status: DISCONTINUED | OUTPATIENT
Start: 2017-06-13 | End: 2017-06-13 | Stop reason: HOSPADM

## 2017-06-13 RX ADMIN — VANCOMYCIN HYDROCHLORIDE 1000 MG: 1 INJECTION, POWDER, LYOPHILIZED, FOR SOLUTION INTRAVENOUS at 02:04

## 2017-06-13 RX ADMIN — HYDROCODONE BITARTRATE AND ACETAMINOPHEN 1 TABLET: 7.5; 325 TABLET ORAL at 09:06

## 2017-06-13 RX ADMIN — Medication 10 ML: at 13:20

## 2017-06-13 RX ADMIN — HYDROCODONE BITARTRATE AND ACETAMINOPHEN 1 TABLET: 7.5; 325 TABLET ORAL at 15:41

## 2017-06-13 RX ADMIN — PIPERACILLIN SODIUM,TAZOBACTAM SODIUM 3.38 G: 3; .375 INJECTION, POWDER, FOR SOLUTION INTRAVENOUS at 09:06

## 2017-06-13 RX ADMIN — LAMOTRIGINE 200 MG: 100 TABLET ORAL at 08:56

## 2017-06-13 RX ADMIN — CLONAZEPAM 0.5 MG: 0.5 TABLET ORAL at 08:56

## 2017-06-13 RX ADMIN — CARBAMAZEPINE 300 MG: 300 CAPSULE, EXTENDED RELEASE ORAL at 08:56

## 2017-06-13 RX ADMIN — Medication 10 ML: at 05:28

## 2017-06-13 RX ADMIN — DOCUSATE SODIUM -SENNOSIDES 1 TABLET: 50; 8.6 TABLET, COATED ORAL at 08:56

## 2017-06-13 RX ADMIN — PIPERACILLIN SODIUM,TAZOBACTAM SODIUM 3.38 G: 3; .375 INJECTION, POWDER, FOR SOLUTION INTRAVENOUS at 05:20

## 2017-06-13 RX ADMIN — LEVETIRACETAM 750 MG: 100 SOLUTION ORAL at 08:56

## 2017-06-13 NOTE — PROGRESS NOTES
2:52 PM Spoke to Altagracia Carrillo with [facility] [phone], the office will call and obtain the orders for home health. Thanks ATIF Bourne       2:39 PM I have sent AVS to [facility]. I have not heard back from physicians office. I have asked [facility] to please follow up and call the physician for orders. Thanks ATIF Bourne     I called Dr. Lema Pill nurse [phone] and left a voice mail message regarding orders for home health [phone]. Pt is currently open to [facility].  Thanks ATIF Bourne

## 2017-06-13 NOTE — PROGRESS NOTES
Bedside and Verbal shift change report given to Sharyle Hinds, RN (oncoming nurse) by Blas Essex, RN (offgoing nurse). Report included the following information SBAR, Kardex, MAR and Cardiac Rhythm NSR.

## 2017-06-13 NOTE — DISCHARGE SUMMARY
[address] [facility])   [address] [place],  [address] [facility]    DISCHARGE SUMMARY     Patient: Felton Samuels                             Medical Record Number: [id number]                : 1965  Age: 46 y.o. Admit Date: 2017  Discharge Date:   Admission Diagnosis: Sepsis [facility])  Discharge Diagnosis: Atelectasis  Procedures: None  Surgeon: Freddy Ferguson MD  Anesthesia: None  Complications: None       Hospital Course:  Felton Samuels was readmitted one week status post ACDF C5-C7 performed on 2017 the previous week. The patient was admitted with fever and tachycardia and was presumed to have an infection although, no source of infection was ever identified. The patient had an extensive workup including but not limited to chest CTA, CBC with diff x 3,  UA and venous doppler. The patient was found to have mild anemia. She was transferred  from the ER to the Joint and Spine Unit at [facility].  On day #1, the dressing was clean and dry, she was neurovascularly intact. She received 1 dose of Vanc and Zosyn which was discontinued when no source of infection was identified. The patient was afebrile and vital signs were stable by day #2. Calves were soft and non-tender bilaterally. On day  # 2, the patient was tolerating a regular diet and making satisfactory progress with physical therapy. Hemoglobin and INR prior to discharge were     Lab Results   Component Value Date/Time    HGB 10.6 2017 01:11 AM    INR 1.0 2017 02:56 AM       Briana Leslie made satisfactory progress with physical therapy and was discharged to Home in stable condition on day #2. She was provided with routine postoperative instructions and advised to follow up in my office in 1-2 weeks following discharge from the hospital.  She was prescribed pain medication for post-operative analgesia and iron for anemia.     Discharge Medications:  Current Discharge Medication List      START taking these medications    Details   Omeprazole delayed release (PRILOSEC D/R) 20 mg tablet Take 1 Tab by mouth daily. Indications: gastroesophageal reflux disease, HEARTBURN  Qty: 30 Tab, Refills: 1         CONTINUE these medications which have CHANGED    Details   HYDROcodone-acetaminophen (NORCO) 7.5-325 mg per tablet Take 1 Tab by mouth every four (4) hours as needed. Max Daily Amount: 6 Tabs. Qty: 60 Tab, Refills: 0         CONTINUE these medications which have NOT CHANGED    Details   levETIRAcetam (KEPPRA) 100 mg/mL solution Take 750 mg by mouth two (2) times a day. 0900 and 2100      cholecalciferol, VITAMIN D3, (VITAMIN D3) 5,000 unit tab tablet Take 5,000 Units by mouth every evening. 1700      carBAMazepine ER (CARBATROL ER) 300 mg capsule Take 300 mg by mouth two (2) times a day. 0900 and 2100      lamoTRIgine (LAMICTAL) 200 mg tablet Take 200 mg by mouth two (2) times a day. 0900 and 2100      clonazePAM (KLONOPIN) 0.5 mg tablet Take 0.5 mg by mouth three (3) times daily.  0900, 1700, 2100         STOP taking these medications       levETIRAcetam (KEPPRA) 750 mg tablet Comments:   Reason for Stopping: not taking              Signed by: WILFREDO Abarca  6/13/2017

## 2017-06-13 NOTE — DISCHARGE INSTRUCTIONS
Lindsay Logan M.D. [address] [facility]  Office Phone: [phone]    Neck Surgery Discharge Instructions    Activities   You are going home a well person, be as active as possible. Your only exercise should be walking. Start with short frequent walks and increase your walking distance each day. Start with walking twice a day for 5 minutes and increase your distance each day 2-3 minutes until you reach 25 minutes twice a day. Limit the amount of time you sit to 20-30 minute intervals. Sitting for prolonged periods of time will be uncomfortable for you following your surgery.  Do not lift anything over five pounds, and do not do any bending or straining.  Avoid reaching overhead for 2-3 weeks   Do not do any neck exercises until you have been instructed by your doctor.  When you are in the bed, you may lay on your back or on either side. Do not lay on your stomach.  Continue using your incentive spirometer regularly for deep breathing exercises   You may resume sexual relations 2 weeks after your surgery    Diet   You may resume your normal diet. If your throat is sore, you may want to eat soft foods for a few days. Be sure to drink plenty of fluids, it is important to keep yourself hydrated. If you begin having trouble swallowing, call the office immediately.  Avoid alcoholic beverages and ABSOLUTELY NO tobacco products. Tobacco products will interfere with your healing. If you continue to use tobacco, you may end up needing another surgery in the future. Medications   Do not take anti-inflammatory medications or aspirin unless instructed by your physician.  Take your pain medication as directed.  Do NOT take additional Tylenol if your prescribed pain medication has acetaminophen in it (Endocet/Percocet, Lortab, Norco).  It is important to have regular bowel movements. Pain medications may cause constipation.   Stool softeners, prune juice, and increasing your water and fiber intake may help in preventing constipation.  Do NOT take laxatives if at all possible except in severe situations. It can results in a vicious cycle of constipation and diarrhea.  Do not be alarmed if you still have some of the same symptoms you had prior to surgery. The nerves often require time to heal after the pressure has been relieved. You may experience pain in your shoulders or between your shoulder blades, which is common after this surgery. The level of pain you experience should improve as your body heals. Driving   You may not drive or return to work until instructed by your physician. However, you may ride in the car for doctors appointments ONLY. Neck collar   You are required to wear your cervical collar at all times. You may remove the collar long enough to change the pads when needed and to change your dressing each day.  Do not bend or twist your neck when your collar is removed. It is best to have someone assist you when changing the pads and dressing to prevent you from bending your neck. Showering   You may shower in approximately 4 days after your surgery if your incision is not draining.  Leave the dressing on during your shower but avoid getting the dressing wet.  Do not use tub baths, swimming pools or Jacuzzis.  Neck collars may need to be worn even while in the shower. If your collar is removed for showering, be sure not to turn your neck while the collar is off. Also, make sure you put dry pads on your collar after your shower. Caring for your incision   Leave the dressing on x 7 days after surgery. At that point, you may remove the dressing and keep the incision open to air.  You will probably have steri-strips on your incision (small, white pieces of tape). Do not pull the steri-strips; they will fall off on their own after several days.   If you have sutures or staples, they will be removed when you see your physician.  Do not rub or apply any lotions or ointments to your incision site. Follow Up   Once you are home, call your physicians office to schedule an appointment for your 1-2 week postoperative visit. Notify your physician if you develop any of the following conditions:   Fever above 101 degrees for 24 hours.  Nausea or vomiting.  Severe headache.  Inability to urinate.  Loss of bowel or bladder control (sudden onset of incontinence).  Changes in sensation in your extremities (numbness, tingling, loss of color).  Severe pain or pain not relieved by medications.  Redness, swelling, or drainage from your incision.  Persistent pain in the chest.    Pain in the calf of either leg.  Increased weakness (if this is greater than before your surgery).

## 2017-06-13 NOTE — PROGRESS NOTES
Discharge instructions were reviewed with patient. All questions were answered. IV site and heart monitor were removed. Dressing was changed to silver based dressing prior to leaving as per ortho PA. Case Management arranged for her home health to be resumed by [facility]. Patient stated that they had already called her this afternoon. Patient received a copy of her discharge papers and 3 prescriptions and will be discharged home with her family.

## 2017-06-13 NOTE — PROGRESS NOTES
Left message for Dr. Marisa Sterling assistance to call and clarify what type of silver based dressing we are to use prior to discharge today. 1515  I spoke to William Monsalve with Wound Care and she answered my questions about the silver based dressing. Will apply before patient is discharged.

## 2017-06-13 NOTE — CONSULTS
[address] [facility] RESIDENCY PROGRAM  PROGRESS NOTE     6/13/2017  PCP: Emelina Josue NP     Assessment/Plan:   Sindhu Howell is a 46 y.o. female who is POD s/p C5-C7 discectomy with fusion and instrumentation. The Family Medicine Service was consulted for medical management of sepsis, assistance with antibiotic management, and general medical management.     POD8 s/p C5-C7 Discectomy with Fusion - Per ortho    Postsurgical fever. Admitted for sepsis 2/2 Soft Tissue Infection Likely Related to Surgical Wound (?).- 4/4 SIRS  however per ortho records the infection is less likely. Work up post post surgical fever work up was done. Blood cx negative for 2 days. CTA negative for PE, Duplex negative for DVT. Started on IV vancomycin, zosyn. Given 30cc/kg NS bolus. Blood cultures negative x 2 days so far, urine cultures are pending. The septic picture resolved. -Will stop antibiotics at this point, follow up on final blood and urine culture and put on antibiotics if needed.   -Can be discharged today w/o antibiotics today    Seizure Disorder  -Resume home antiepileptics: Carbamazepine, klonopin, Lamictal, keppra. PT/OT, disposition, DVT Prophylaxis & Pain Management: per primary team    Thank you very much for allowing us to participate in the care of this pleasant patient. The family medicine service will continue to follow the patient's medical progress along with you. Please do not hesitate to page with any questions or to discuss the case. Subjective:   Pt was seen and examined at bedside. Afebrile and hemodynamically stable. Concerns overnight include: None. Denies chest pain, SOB, nausea, vomiting, abdominal pain, dizziness. Not having the BM but passing the gas. Allergies: Allergies   Allergen Reactions    Aspirin Other (comments)     Chest pain.  Has taken ibuprofen ,diclofenac before    Codeine Other (comments)     Chest pain      Phenobarbital Other (comments) hallucinate       Objective:   Physical examination  Visit Vitals    /65 (BP 1 Location: Left arm, BP Patient Position: At rest)    Pulse 83    Temp 97.9 °F (36.6 °C)    Resp 19    Ht 5' 3\" (1.6 m)    Wt 202 lb 13.2 oz (92 kg)    SpO2 96%    BMI 35.93 kg/m2      Temp (24hrs), Av.9 °F (36.6 °C), Min:97.7 °F (36.5 °C), Max:98.2 °F (36.8 °C)         O2 Device: Room air      Date 17 - 17 - 1759   Shift 9870-9097 8215-8970 24 Hour Total 6324-3941 2318-2125 24 Hour Total   I  N  T  A  K  E   P.O.  200 200         P. O.  200 200       I.V.  (mL/kg/hr)  2458  (2.2) 2458  (1.1)         Volume (0.9% sodium chloride infusion)  1658 1658         Volume (piperacillin-tazobactam (ZOSYN) 3.375 g in 0.9% sodium chloride (MBP/ADV) 100 mL)  300 300         Volume (vancomycin (VANCOCIN) 1,000 mg in 0.9% sodium chloride (MBP/ADV) 250 mL)  500 500       Shift Total  (mL/kg)  2658  (28.9) 2658  (28.9)      O  U  T  P  U  T   Urine  (mL/kg/hr)            Urine Occurrence(s) 4 x 4 x 8 x       Shift Total  (mL/kg)         NET  2658 2658      Weight (kg) 91 92 92 92 92 92         Last 3 shifts:    1901 -  0700  In: 5216.3 [P.O.:200; I.V.:5016.3]  Out: -     General:   Alert, cooperative, no acute distress   Head:   Atraumatic   Eyes:   Conjunctivae clear   ENT:  Oral mucosa normal   Neck:  Wearing postsurgical collar.    Back:    No CVA tenderness    Chest wall:    No tenderness or deformities    Lungs:   Clear to auscultation bilaterally    Heart:   Regular rhythm, no murmur   Abdomen:    Soft, non-tender   No masses or organomegaly    Extremities:  No edema or DVT signs   Pulses:  Symmetric all extremities   Skin:  Warm and dry    No rashes or lesions   Neurologic:  Oriented   No focal deficits         Data Review:     Recent Labs      17   0111  17   0251  174   WBC  6.0  11.8*  12.9*   HGB  10.6*  11.6  13.1   HCT  31.4*  33.7*  36.8   PLT 166  190  220     Recent Labs      06/13/17   0111  06/12/17   0256  06/12/17   0251  06/11/17   2234   NA  141   --   141  139   K  4.0   --   4.2  4.3   CL  106   --   107  102   CO2  28   --   27  29   GLU  100   --   111*  148*   BUN  8   --   10  11   CREA  0.80   --   0.86  0.98   CA  9.2   --   9.2  10.4*   ALB   --    --   3.0*  3.4*   SGOT   --    --   21  25   ALT   --    --   19  22   INR   --   1.0   --    --      Medications reviewed  Current Facility-Administered Medications   Medication Dose Route Frequency    vancomycin trough at 1330, 6/13   Other ONCE    polyethylene glycol (MIRALAX) packet 17 g  17 g Oral DAILY PRN    0.9% sodium chloride infusion  125 mL/hr IntraVENous CONTINUOUS    sodium chloride (NS) flush 5-10 mL  5-10 mL IntraVENous Q8H    sodium chloride (NS) flush 5-10 mL  5-10 mL IntraVENous PRN    naloxone (NARCAN) injection 0.4 mg  0.4 mg IntraVENous PRN    diphenhydrAMINE (BENADRYL) injection 25 mg  25 mg IntraVENous Q4H PRN    senna-docusate (PERICOLACE) 8.6-50 mg per tablet 1 Tab  1 Tab Oral DAILY    HYDROcodone-acetaminophen (NORCO) 7.5-325 mg per tablet 1 Tab  1 Tab Oral Q4H PRN    acetaminophen (TYLENOL) tablet 500 mg  500 mg Oral Q6H PRN    ondansetron (ZOFRAN) injection 6 mg  6 mg IntraVENous Q6H PRN    piperacillin-tazobactam (ZOSYN) 3.375 g in 0.9% sodium chloride (MBP/ADV) 100 mL  3.375 g IntraVENous Q8H    carBAMazepine ER (CARBATROL ER) capsule 300 mg  300 mg Oral Q12H    clonazePAM (KlonoPIN) tablet 0.5 mg  0.5 mg Oral TID    lamoTRIgine (LaMICtal) tablet 200 mg  200 mg Oral Q12H    levETIRAcetam (KEPPRA) oral solution 750 mg  750 mg Oral Q12H    vancomycin (VANCOCIN) 1,000 mg in 0.9% sodium chloride (MBP/ADV) 250 mL  1 g IntraVENous Q12H    cholecalciferol (VITAMIN D3) tablet 5,000 Units  5,000 Units Oral QPM    sodium chloride (NS) flush 5-10 mL  5-10 mL IntraVENous PRN           Signed:   Gerard Pete MD   Resident, Family Medicine        Attending note: Attending note to follow. ..

## 2017-06-13 NOTE — PROGRESS NOTES
Ortho Spine Daily Progress Note    Date of Surgery:  2017     Patient: Ana Castillo   YOB: 1965  Age: 46 y.o. SUBJECTIVE:       8 days status post ACDF C5-7. The patient is without c/o at this time. The patient's pain is well-controlled. Arm pain remains resolved, modest neck pain. Fever/Tach improved. She denies CP, SOB, N/V/D, dizziness,  + abdominal pain, HA. +flatus, - BM    OBJECTIVE:     Vital Signs:    Temp (24hrs), Av.9 °F (36.6 °C), Min:97.7 °F (36.5 °C), Max:98.2 °F (36.8 °C)    Patient Vitals for the past 8 hrs:   BP Temp Pulse Resp SpO2 Weight   17 0748 134/65 97.9 °F (36.6 °C) 83 19 96 % -   17 0548 133/79 97.8 °F (36.6 °C) 97 20 97 % -   17 0529 - - - - - 92 kg (202 lb 13.2 oz)   17 0027 130/66 97.9 °F (36.6 °C) 78 16 95 % -           Physical Exam:  General: A&Ox3, NAD. Respiratory: Respirations are unlabored. Abdomen: mild LLQ pain  Surgical site: C,D and I .  Musculoskeletal: Calves are S/NT, Tejinder /intrinsics/wrist extension/biceps/triceps intact,   Tejinder dorsi/plantar flexion/EHL intact, Tejinder RP/DP 2+  Neurological:  Tejinder UE's/LE's NVI    Laboratory Values:             Recent Labs      17   0111  17   0256   HGB  10.6*   --    PLT  166   --    INR   --   1.0   CREA  0.80   --    GLU  100   --      Lab Results   Component Value Date/Time    Sodium 141 2017 01:11 AM    Potassium 4.0 2017 01:11 AM    Chloride 106 2017 01:11 AM    CO2 28 2017 01:11 AM    Glucose 100 2017 01:11 AM    BUN 8 2017 01:11 AM    Creatinine 0.80 2017 01:11 AM    Calcium 9.2 2017 01:11 AM       PLAN:     8 days S/P ACDF C5-7 - readmit for fever/tach now resolved. Mobilize and continue with PT BID until discharged. Hemodynamics Hgb today is 10.6. Wound Change dressing to silver based dressing prior to discharge. Post Operative Pain Pain Control: Adequate, continue current regimen.      DVT Prophylaxis Continue with SCD'S, Ankle Pump Exercises, Mobilize. Discharge Disposition Discharge plan: Plan on d/c home today. Follow up in office in 1-2 weeks.       Signed By: Lilibeth Casas PA-C  June 13, 2017 8:17 AM

## 2017-06-14 ENCOUNTER — PATIENT OUTREACH (OUTPATIENT)
Dept: FAMILY MEDICINE CLINIC | Age: 52
End: 2017-06-14

## 2017-06-14 NOTE — PROGRESS NOTES
[address] [facility]  Nursing Note  [phone]    Patient Name: Manohar Zabala  YOB: 1965   Date/Time:  6/14/2017 10:13 AM    Patient listed on nurse navigator combined daily census report on 6/14/17. Patient hospitalized from 6/11-6/13/17 for SIRS s/p 6/5-6/7/17 Cervical stenosis- c5-c7 cervical fusion at [address] [place] [address]. Per ED Provider, Patient C/o fever and chills at home x 48 hrs. ER workup reveals SIRS criteria. /102, P 137, Temp-102.9, BC 13, ESR 44, CRP 3.73, , Lactic acid 2.1. Last seen by [facility] on 5/22/17 by Ania Guan NP. Has appt with surgeon next week. Declined follow up with PCP at present time. Nurse Navigator(NN) contacted the patient by telephone to perform post hospital/ED discharge assessment. Feels she is doing well. Pain controlled by current pain medication regimen. Activity as tolerated. Reviewed restrictions/red flags. Collar in place. Transparent dressing and steri strips in place. Denies drainage. Working with PT. Readmission Risk  RRAT score: Chales Minus Chales Minus Low Risk            7       Total Score        3 Relationship with PCP    4 More than 1 Admission in calendar year        Criteria that do not apply:    Patient Living Status    Patient Length of Stay > 5    Patient Insurance is Medicare, Medicaid or Self Pay    Charlson Comorbidity Score      Total Hospitalizations/ED visits:  1 in the past 6 months prior to most recent hospitalization    Medical History:     Past Medical History:   Diagnosis Date    Degenerative disc disease, cervical     C2 C3    Headache     Ill-defined condition     anxiety    Seizures (HCC)     daily petite mal seizure    UTI (urinary tract infection) 05/22/2017       Diet:   She is sticking with a soft diet for swallowing reasons. Using chloraseptic to help. Activity:    Activity increase as tolerated. No lifting > 5, bending, straining  or reaching overhead.  Wear neck collar at all times    Current Outpatient Prescriptions   Medication Sig    HYDROcodone-acetaminophen (NORCO) 7.5-325 mg per tablet Take 1 Tab by mouth every four (4) hours as needed. Max Daily Amount: 6 Tabs.  Omeprazole delayed release (PRILOSEC D/R) 20 mg tablet Take 1 Tab by mouth daily. Indications: gastroesophageal reflux disease, HEARTBURN    iron,carbonyl-vitamin C (VITRON-C) 65 mg iron- 125 mg TbEC Take 65 mg by mouth two (2) times a day. Indications: post op anemia    levETIRAcetam (KEPPRA) 100 mg/mL solution Take 750 mg by mouth two (2) times a day. 0900 and 2100    cholecalciferol, VITAMIN D3, (VITAMIN D3) 5,000 unit tab tablet Take 5,000 Units by mouth every evening. 1700    carBAMazepine ER (CARBATROL ER) 300 mg capsule Take 300 mg by mouth two (2) times a day. 0900 and 2100    lamoTRIgine (LAMICTAL) 200 mg tablet Take 200 mg by mouth two (2) times a day. 0900 and 2100    clonazePAM (KLONOPIN) 0.5 mg tablet Take 0.5 mg by mouth three (3) times daily. 0900, 1700, 2100     No current facility-administered medications for this visit. Allergies   Allergen Reactions    Aspirin Other (comments)     Chest pain. Has taken ibuprofen ,diclofenac before    Codeine Other (comments)     Chest pain      Phenobarbital Other (comments)     hallucinate       Support system:  patient, boyfriend and 15year old grandson.:      [address] orders at discharge? yes If yes, what agency and what services? SN and PT with [facility]     Red Flags:  Fever >101 for 24 hours, nausea, vomiting, severe headache, unable to urinate, changes in sensation, severe pain, redness, swelling or drainage from your incision site, pain in the calf of leg.     Labs Reviewed:  Reviewed with patient  Recent Labs      06/13/17   0111 06/12/17 0251 06/11/17   2234   WBC  6.0  11.8*  12.9*   HGB  10.6*  11.6  13.1   HCT  31.4*  33.7*  36.8   PLT  166  190  220     Recent Labs      06/13/17   0111  06/12/17 0256  06/12/17 0251 06/11/17   2234   NA  141   --   141  139   K  4.0   --   4.2  4.3   CL  106   --   107  102   CO2  28   --   27  29   GLU  100   --   111*  148*   BUN  8   --   10  11   CREA  0.80   --   0.86  0.98   CA  9.2   --   9.2  10.4*   ALB   --    --   3.0*  3.4*   SGOT   --    --   21  25   ALT   --    --   19  22   INR   --   1.0   --    --        Recent Labs      06/12/17   0256   INR  1.0     Advance Medical Directive on file in EMR? no not on file; Briefly discussed ACP and provided Honoring Choices packet; willing to look over and discuss further at next office visit. Plan:  NN plan is to continue to monitor during the [address] Follow-up episode. Agrees to follow up call in a couple of weeks. Will verify she has received ACP packet. .   ..  Goals      Attends follow-up appointments as directed. 6/15- States she has appt next week with Surgeon, Dr Merceeds Hawkins. Declined appt with Lilliana Sen at present time.  Coordinate Pain Management Plan for Patient. 6/15/17 Current pain management regimen controlling pain. Norco 7.5mg taking mostly just at bedtime.  Prevent complications post hospitalization. 6/15-  SN. PT and OT coming into home to monitor.

## 2017-06-17 LAB
BACTERIA SPEC CULT: NORMAL
BACTERIA SPEC CULT: NORMAL
SERVICE CMNT-IMP: NORMAL
SERVICE CMNT-IMP: NORMAL

## 2017-06-21 ENCOUNTER — PATIENT OUTREACH (OUTPATIENT)
Dept: FAMILY MEDICINE CLINIC | Age: 52
End: 2017-06-21

## 2017-06-21 NOTE — PROGRESS NOTES
[address]  [phone]  Fax [phone]    Patient Name: Sindhu Howell  YOB: 1965  . Isabel Stein      Attends follow-up appointments as directed. 6/15- States she has appt next week with Surgeon, Dr Wade Lancaster. Declined appt with Low Velez at present time.  Coordinate Pain Management Plan for Patient. 6/15/17 Current pain management regimen controlling pain. Norco 7.5mg taking mostly just at bedtime.  Prepare patients and caregivers for end of life decisions (ie. need for hospice, pain management, symptom relief, advance directives etc.)            6/21/17 Sending Respecting [address] 18 packet in mail per patient request.        Prevent complications post hospitalization. 6/15-  . PT and OT coming into home to monitor.

## 2017-06-29 ENCOUNTER — PATIENT OUTREACH (OUTPATIENT)
Dept: FAMILY MEDICINE CLINIC | Age: 52
End: 2017-06-29

## 2017-06-29 NOTE — PROGRESS NOTES
[address]  [phone]  Fax [phone]    Patient Name: Syd Cameron  YOB: 1965    Follow up call from Ambulatory NN. MATTHEW on VM    . Espinoza Bains Emil      Attends follow-up appointments as directed. 6/15- States she has appt next week with Surgeon, Dr Barb Brunson. Declined appt with Lisa Mcgowan at present time.  Coordinate Pain Management Plan for Patient. 6/15/17 Current pain management regimen controlling pain. Norco 7.5mg taking mostly just at bedtime.  Prepare patients and caregivers for end of life decisions (ie. need for hospice, pain management, symptom relief, advance directives etc.)            6/21/17 Sending Respecting [address] 18 packet in mail per patient request.        Prevent complications post hospitalization. 6/15- HH SN. PT and OT coming into home to monitor.

## 2017-07-13 ENCOUNTER — PATIENT OUTREACH (OUTPATIENT)
Dept: FAMILY MEDICINE CLINIC | Age: 52
End: 2017-07-13

## 2017-07-13 NOTE — PROGRESS NOTES
[address]  [phone]  Fax [phone]    Patient Name: Beti Gomez  YOB: 1965    Patient hospitalized from 6/11-6/13/17 for SIRS s/p 6/5-6/7/17 Cervical stenosis- c5-c7 cervical fusion at [address] [place] [address]. She states she has kept her follow up appts and has not had any further issues at the present time. Only complaint at present is difficulty sleeping due to collar. Resolving post 30 day Transitions of Care Coordination episode.

## 2019-08-14 ENCOUNTER — APPOINTMENT (OUTPATIENT)
Dept: MRI IMAGING | Age: 54
End: 2019-08-14
Attending: STUDENT IN AN ORGANIZED HEALTH CARE EDUCATION/TRAINING PROGRAM
Payer: MEDICAID

## 2019-08-14 ENCOUNTER — APPOINTMENT (OUTPATIENT)
Dept: CT IMAGING | Age: 54
End: 2019-08-14
Attending: EMERGENCY MEDICINE
Payer: MEDICAID

## 2019-08-14 ENCOUNTER — HOSPITAL ENCOUNTER (OUTPATIENT)
Age: 54
Setting detail: OBSERVATION
Discharge: HOME | End: 2019-08-16
Attending: EMERGENCY MEDICINE | Admitting: FAMILY MEDICINE
Payer: MEDICAID

## 2019-08-14 ENCOUNTER — APPOINTMENT (OUTPATIENT)
Dept: CT IMAGING | Age: 54
End: 2019-08-14
Attending: STUDENT IN AN ORGANIZED HEALTH CARE EDUCATION/TRAINING PROGRAM
Payer: MEDICAID

## 2019-08-14 ENCOUNTER — APPOINTMENT (OUTPATIENT)
Dept: GENERAL RADIOLOGY | Age: 54
End: 2019-08-14
Attending: EMERGENCY MEDICINE
Payer: MEDICAID

## 2019-08-14 DIAGNOSIS — R27.0 ATAXIA: Primary | ICD-10-CM

## 2019-08-14 DIAGNOSIS — G40.909 SEIZURE DISORDER (HCC): ICD-10-CM

## 2019-08-14 DIAGNOSIS — F98.5 ADULT STUTTERING: ICD-10-CM

## 2019-08-14 DIAGNOSIS — R47.1 DYSARTHRIA: ICD-10-CM

## 2019-08-14 PROBLEM — G40.209 COMPLEX PARTIAL SEIZURE (HCC): Status: ACTIVE | Noted: 2019-08-14

## 2019-08-14 PROBLEM — I63.9 STROKE (HCC): Status: ACTIVE | Noted: 2019-08-14

## 2019-08-14 LAB
ALBUMIN SERPL-MCNC: 3.5 G/DL (ref 3.5–5)
ALBUMIN/GLOB SERPL: 0.9 {RATIO} (ref 1.1–2.2)
ALP SERPL-CCNC: 126 U/L (ref 45–117)
ALT SERPL-CCNC: 21 U/L (ref 12–78)
AMPHET UR QL SCN: NEGATIVE
ANION GAP SERPL CALC-SCNC: 3 MMOL/L (ref 5–15)
APPEARANCE UR: CLEAR
AST SERPL-CCNC: 22 U/L (ref 15–37)
BACTERIA URNS QL MICRO: ABNORMAL /HPF
BARBITURATES UR QL SCN: NEGATIVE
BASOPHILS # BLD: 0 K/UL (ref 0–0.1)
BASOPHILS NFR BLD: 0 % (ref 0–1)
BENZODIAZ UR QL: NEGATIVE
BILIRUB SERPL-MCNC: 0.2 MG/DL (ref 0.2–1)
BILIRUB UR QL: NEGATIVE
BUN SERPL-MCNC: 8 MG/DL (ref 6–20)
BUN/CREAT SERPL: 9 (ref 12–20)
CALCIUM SERPL-MCNC: 9.6 MG/DL (ref 8.5–10.1)
CANNABINOIDS UR QL SCN: NEGATIVE
CHLORIDE SERPL-SCNC: 107 MMOL/L (ref 97–108)
CO2 SERPL-SCNC: 30 MMOL/L (ref 21–32)
COCAINE UR QL SCN: NEGATIVE
COLOR UR: ABNORMAL
CREAT SERPL-MCNC: 0.9 MG/DL (ref 0.55–1.02)
DIFFERENTIAL METHOD BLD: ABNORMAL
DRUG SCRN COMMENT,DRGCM: NORMAL
EOSINOPHIL # BLD: 0.1 K/UL (ref 0–0.4)
EOSINOPHIL NFR BLD: 1 % (ref 0–7)
EPITH CASTS URNS QL MICRO: ABNORMAL /LPF
ERYTHROCYTE [DISTWIDTH] IN BLOOD BY AUTOMATED COUNT: 12.4 % (ref 11.5–14.5)
ETHANOL SERPL-MCNC: <10 MG/DL
GLOBULIN SER CALC-MCNC: 4 G/DL (ref 2–4)
GLUCOSE BLD STRIP.AUTO-MCNC: 108 MG/DL (ref 65–100)
GLUCOSE SERPL-MCNC: 100 MG/DL (ref 65–100)
GLUCOSE UR STRIP.AUTO-MCNC: NEGATIVE MG/DL
HCT VFR BLD AUTO: 38 % (ref 35–47)
HGB BLD-MCNC: 12.9 G/DL (ref 11.5–16)
HGB UR QL STRIP: NEGATIVE
HYALINE CASTS URNS QL MICRO: ABNORMAL /LPF (ref 0–5)
IMM GRANULOCYTES # BLD AUTO: 0.1 K/UL (ref 0–0.04)
IMM GRANULOCYTES NFR BLD AUTO: 1 % (ref 0–0.5)
KETONES UR QL STRIP.AUTO: NEGATIVE MG/DL
LEUKOCYTE ESTERASE UR QL STRIP.AUTO: ABNORMAL
LYMPHOCYTES # BLD: 2.3 K/UL (ref 0.8–3.5)
LYMPHOCYTES NFR BLD: 31 % (ref 12–49)
MCH RBC QN AUTO: 31.9 PG (ref 26–34)
MCHC RBC AUTO-ENTMCNC: 33.9 G/DL (ref 30–36.5)
MCV RBC AUTO: 93.8 FL (ref 80–99)
METHADONE UR QL: NEGATIVE
MONOCYTES # BLD: 0.5 K/UL (ref 0–1)
MONOCYTES NFR BLD: 6 % (ref 5–13)
NEUTS SEG # BLD: 4.6 K/UL (ref 1.8–8)
NEUTS SEG NFR BLD: 61 % (ref 32–75)
NITRITE UR QL STRIP.AUTO: NEGATIVE
NRBC # BLD: 0 K/UL (ref 0–0.01)
NRBC BLD-RTO: 0 PER 100 WBC
OPIATES UR QL: NEGATIVE
PCP UR QL: NEGATIVE
PH UR STRIP: 7 [PH] (ref 5–8)
PLATELET # BLD AUTO: 213 K/UL (ref 150–400)
PMV BLD AUTO: 9.3 FL (ref 8.9–12.9)
POTASSIUM SERPL-SCNC: 4.8 MMOL/L (ref 3.5–5.1)
PROT SERPL-MCNC: 7.5 G/DL (ref 6.4–8.2)
PROT UR STRIP-MCNC: NEGATIVE MG/DL
RBC # BLD AUTO: 4.05 M/UL (ref 3.8–5.2)
RBC #/AREA URNS HPF: ABNORMAL /HPF (ref 0–5)
SERVICE CMNT-IMP: ABNORMAL
SODIUM SERPL-SCNC: 140 MMOL/L (ref 136–145)
SP GR UR REFRACTOMETRY: 1.01 (ref 1–1.03)
UR CULT HOLD, URHOLD: NORMAL
UROBILINOGEN UR QL STRIP.AUTO: 1 EU/DL (ref 0.2–1)
WBC # BLD AUTO: 7.6 K/UL (ref 3.6–11)
WBC URNS QL MICRO: ABNORMAL /HPF (ref 0–4)

## 2019-08-14 PROCEDURE — 70450 CT HEAD/BRAIN W/O DYE: CPT

## 2019-08-14 PROCEDURE — 87086 URINE CULTURE/COLONY COUNT: CPT

## 2019-08-14 PROCEDURE — 70551 MRI BRAIN STEM W/O DYE: CPT

## 2019-08-14 PROCEDURE — 82962 GLUCOSE BLOOD TEST: CPT

## 2019-08-14 PROCEDURE — 81001 URINALYSIS AUTO W/SCOPE: CPT

## 2019-08-14 PROCEDURE — 74011636320 HC RX REV CODE- 636/320

## 2019-08-14 PROCEDURE — 82140 ASSAY OF AMMONIA: CPT

## 2019-08-14 PROCEDURE — 65270000029 HC RM PRIVATE

## 2019-08-14 PROCEDURE — 99218 HC RM OBSERVATION: CPT

## 2019-08-14 PROCEDURE — 80175 DRUG SCREEN QUAN LAMOTRIGINE: CPT

## 2019-08-14 PROCEDURE — 99284 EMERGENCY DEPT VISIT MOD MDM: CPT

## 2019-08-14 PROCEDURE — 71046 X-RAY EXAM CHEST 2 VIEWS: CPT

## 2019-08-14 PROCEDURE — 80177 DRUG SCRN QUAN LEVETIRACETAM: CPT

## 2019-08-14 PROCEDURE — 80053 COMPREHEN METABOLIC PANEL: CPT

## 2019-08-14 PROCEDURE — 36415 COLL VENOUS BLD VENIPUNCTURE: CPT

## 2019-08-14 PROCEDURE — 74011250637 HC RX REV CODE- 250/637: Performed by: STUDENT IN AN ORGANIZED HEALTH CARE EDUCATION/TRAINING PROGRAM

## 2019-08-14 PROCEDURE — 85025 COMPLETE CBC W/AUTO DIFF WBC: CPT

## 2019-08-14 PROCEDURE — 93005 ELECTROCARDIOGRAM TRACING: CPT

## 2019-08-14 PROCEDURE — 80307 DRUG TEST PRSMV CHEM ANLYZR: CPT

## 2019-08-14 PROCEDURE — 70496 CT ANGIOGRAPHY HEAD: CPT

## 2019-08-14 PROCEDURE — 80156 ASSAY CARBAMAZEPINE TOTAL: CPT

## 2019-08-14 RX ORDER — ACETAMINOPHEN 650 MG/1
650 SUPPOSITORY RECTAL
Status: DISCONTINUED | OUTPATIENT
Start: 2019-08-14 | End: 2019-08-16 | Stop reason: HOSPADM

## 2019-08-14 RX ORDER — LEVETIRACETAM 750 MG/1
750 TABLET ORAL 2 TIMES DAILY
COMMUNITY

## 2019-08-14 RX ORDER — SODIUM CHLORIDE 0.9 % (FLUSH) 0.9 %
5-40 SYRINGE (ML) INJECTION AS NEEDED
Status: DISCONTINUED | OUTPATIENT
Start: 2019-08-14 | End: 2019-08-16 | Stop reason: HOSPADM

## 2019-08-14 RX ORDER — SODIUM CHLORIDE 0.9 % (FLUSH) 0.9 %
5-40 SYRINGE (ML) INJECTION EVERY 8 HOURS
Status: DISCONTINUED | OUTPATIENT
Start: 2019-08-14 | End: 2019-08-16 | Stop reason: HOSPADM

## 2019-08-14 RX ORDER — ASCORBIC ACID 250 MG
250 TABLET ORAL EVERY EVENING
COMMUNITY

## 2019-08-14 RX ORDER — ACETAMINOPHEN 325 MG/1
650 TABLET ORAL
Status: DISCONTINUED | OUTPATIENT
Start: 2019-08-14 | End: 2019-08-16 | Stop reason: HOSPADM

## 2019-08-14 RX ORDER — NYSTATIN 100000 U/G
CREAM TOPICAL
COMMUNITY

## 2019-08-14 RX ADMIN — ACETAMINOPHEN 650 MG: 325 TABLET ORAL at 23:27

## 2019-08-14 RX ADMIN — Medication 10 ML: at 23:28

## 2019-08-14 RX ADMIN — IOPAMIDOL 100 ML: 755 INJECTION, SOLUTION INTRAVENOUS at 22:06

## 2019-08-14 NOTE — ED PROVIDER NOTES
Patient with hx of absent seizure hx, followed at [facility] (states she has 2-3 seizures daily) arrives c/o loss of balance, slurred speech, and vision changes described as \"not being able to see straight\" x4-5 days. States she takes Tegretol 300mg BID, Lamictal 200mg BID, Keppra 750mg BID. Does admit her Lamictal prescription was changed from chewable tablet to oral tablet 2 weeks ago due to insurance coverage, no dosing changes through. She states, \"it's weird, I'm talking like I'm drunk. \" She states she was admitted to [facility] \"sometime in the past year\" she states due to \"abnormal levels\" of her seizure meds and had to relearn to walk and presented with similar gait issues but not the vision or speech problems, however I am able to find this in our records, only admission in 2017 for sepsis. She reports some nausea, but no vomiting - notes this has since resolved. Fiance at bedside states she is \"staggering\" and walking into walls which is new for her, the past 4-5 days, more pronounced today. No fever, head injury, trauma, neck/back pain, vision loss, No pain. No fever, chills, or numbness/tingling. She is not anticoagulated. She didn't come in to the ER sooner thinking it would resolve as well as she was caring for her grandchild. States she's had 2 seizures today which is her baseline. Neurologist: U  PCP: Karlos Aguilera    I have evaluated the patient as the Provider in Triage. I have reviewed her vital signs and the triage nurse assessment. I have talked with the patient and any available family and advised that I am the provider in triage and have ordered the appropriate study to initiate their work up based on the clinical presentation during my assessment. I have advised that the patient will be accommodated in the Main ED as soon as possible.   I have also requested to contact the triage nurse or myself immediately if the patient experiences any changes in their condition during this brief waiting period.     Note written by Chris Andujar, as dictated by Margaux Martinez MD 5:58 PM             Past Medical History:   Diagnosis Date    Degenerative disc disease, cervical     C2 C3    Headache     Ill-defined condition     anxiety    Seizures ([facility] [address]     daily petite mal seizure    UTI (urinary tract infection) 05/22/2017       Past Surgical History:   Procedure Laterality Date    HX ORTHOPAEDIC  2014    right knee         Family History:   Problem Relation Age of Onset    Hypertension Father     Diabetes Maternal Grandmother     Emphysema Mother        Social History     Socioeconomic History    Marital status: SINGLE     Spouse name: Not on file    Number of children: Not on file    Years of education: Not on file    Highest education level: Not on file   Occupational History    Not on file   Social Needs    Financial resource strain: Not on file    Food insecurity:     Worry: Not on file     Inability: Not on file    Transportation needs:     Medical: Not on file     Non-medical: Not on file   Tobacco Use    Smoking status: Never Smoker    Smokeless tobacco: Never Used   Substance and Sexual Activity    Alcohol use: No    Drug use: No    Sexual activity: Never   Lifestyle    Physical activity:     Days per week: Not on file     Minutes per session: Not on file    Stress: Not on file   Relationships    Social connections:     Talks on phone: Not on file     Gets together: Not on file     Attends [employer] service: Not on file     Active member of club or organization: Not on file     Attends meetings of clubs or organizations: Not on file     Relationship status: Not on file    Intimate partner violence:     Fear of current or ex partner: Not on file     Emotionally abused: Not on file     Physically abused: Not on file     Forced sexual activity: Not on file   Other Topics Concern    Not on file   Social History Narrative    Not on file         ALLERGIES: Aspirin; Codeine; and Phenobarbital    Review of Systems   Constitutional: Negative. Negative for activity change, chills, fatigue and unexpected weight change. HENT: Negative for trouble swallowing. Eyes: Positive for visual disturbance (states \"things around me are moving\". ). Respiratory: Negative for cough, chest tightness, shortness of breath and wheezing. Cardiovascular: Negative. Negative for chest pain and palpitations. Gastrointestinal: Negative. Negative for abdominal pain, diarrhea, nausea and vomiting. Genitourinary: Negative. Negative for dysuria, flank pain, frequency and hematuria. Musculoskeletal: Negative. Negative for arthralgias, back pain, neck pain and neck stiffness. Skin: Negative. Negative for color change and rash. Neurological: Positive for dizziness, seizures and speech difficulty. Negative for syncope, numbness and headaches. Balances problems   All other systems reviewed and are negative. There were no vitals filed for this visit. Physical Exam   Constitutional: She is oriented to person, place, and time. She appears well-developed and well-nourished. She is active. Non-toxic appearance. No distress. WF   HENT:   Head: Normocephalic and atraumatic. Mouth/Throat: Oropharynx is clear and moist.   Eyes: Pupils are equal, round, and reactive to light. Conjunctivae are normal. Right eye exhibits no discharge. Left eye exhibits no discharge. Neck: Normal range of motion and full passive range of motion without pain. No tracheal tenderness present. Cardiovascular: Normal rate, regular rhythm, normal heart sounds, intact distal pulses and normal pulses. Exam reveals no gallop and no friction rub. No murmur heard. Pulmonary/Chest: Effort normal and breath sounds normal. No respiratory distress. She has no wheezes. She has no rales. She exhibits no tenderness. Abdominal: Soft. Bowel sounds are normal. She exhibits no distension. There is no tenderness. There is no rebound and no guarding. Musculoskeletal: Normal range of motion. She exhibits no edema or tenderness. Neurological: She is alert and oriented to person, place, and time. She has normal strength. No cranial nerve deficit. Intermittent slurred speech; no facial droop; Neg pronator drift; fingertip-nose-fingertip intact. Strength 5/5 in upper and lower extremities. NVI throughout. Skin: Skin is warm, dry and intact. Capillary refill takes less than 2 seconds. No abrasion and no rash noted. She is not diaphoretic. No erythema. Psychiatric: She has a normal mood and affect. Her speech is normal and behavior is normal. Cognition and memory are normal.   Nursing note and vitals reviewed. MDM  Number of Diagnoses or Management Options  Cerebrovascular accident (CVA), unspecified mechanism ([facility] [address]):   Seizure disorder ([facility] [address]):   Slurred speech:   Diagnosis management comments:   Ddx: medication reaction, overdose, UTI, electrolyte abnormality, CVA / TIA       Amount and/or Complexity of Data Reviewed  Clinical lab tests: ordered and reviewed  Tests in the radiology section of CPT®: reviewed and ordered  Review and summarize past medical records: yes  Discuss the patient with other providers: yes  Independent visualization of images, tracings, or specimens: yes    Patient Progress  Patient progress: stable         Procedures    EKG interpretation:   Rhythm: normal sinus rhythm; and regular . Rate (approx.): 97; Axis: normal; P wave: normal; QRS interval: normal ; ST/T wave: normal; Other findings: normal.  Interpreted by Dr. Jayden Sousa    Slurred speech, ataxia x 4-5 days per patient, no new sx's today just persistent sx's. Hx of abnormal antiepileptic values in the past causing ataxia per patient, will send off levels, add UDS, ETOH, salicylate/ASA levels. Admit per Dr. Jayden Sousa.   Ruel Lucas PA-C      CONSULT NOTE:   8:11 PM  Ruel Lucas PA-C spoke with family practice resident on call with  Maria[facility],   Discussed pt's hx, disposition, and available diagnostic and imaging results. Reviewed care plans. Consultant agrees with plans as outlined. He will come see and admit patient.    Written by Orlando Cole PA-C.

## 2019-08-14 NOTE — ED TRIAGE NOTES
Pt c/o of slurred speech, blurred vision, and balance problems x 4 days. Reports seizure hx with frequent mini seizures. Pt appears dazed in triage. States \"I felt liek I was drunk yesterday\".

## 2019-08-15 ENCOUNTER — APPOINTMENT (OUTPATIENT)
Dept: NON INVASIVE DIAGNOSTICS | Age: 54
End: 2019-08-15
Attending: STUDENT IN AN ORGANIZED HEALTH CARE EDUCATION/TRAINING PROGRAM
Payer: MEDICAID

## 2019-08-15 LAB
AMMONIA PLAS-SCNC: 22 UMOL/L
ANION GAP SERPL CALC-SCNC: 4 MMOL/L (ref 5–15)
APAP SERPL-MCNC: <2 UG/ML (ref 10–30)
ATRIAL RATE: 79 BPM
BUN SERPL-MCNC: 10 MG/DL (ref 6–20)
BUN/CREAT SERPL: 11 (ref 12–20)
CALCIUM SERPL-MCNC: 9.4 MG/DL (ref 8.5–10.1)
CALCULATED P AXIS, ECG09: 20 DEGREES
CALCULATED R AXIS, ECG10: 48 DEGREES
CALCULATED T AXIS, ECG11: 61 DEGREES
CARBAMAZEPINE SERPL-MCNC: 15.4 UG/ML (ref 4–12)
CHLORIDE SERPL-SCNC: 107 MMOL/L (ref 97–108)
CHOLEST SERPL-MCNC: 201 MG/DL
CO2 SERPL-SCNC: 29 MMOL/L (ref 21–32)
CREAT SERPL-MCNC: 0.89 MG/DL (ref 0.55–1.02)
DIAGNOSIS, 93000: NORMAL
ECHO AO ASC DIAM: 2.8 CM
ECHO AO ROOT DIAM: 2.67 CM
ECHO LA AREA 4C: 11.3 CM2
ECHO LA MAJOR AXIS: 2.62 CM
ECHO LA TO AORTIC ROOT RATIO: 0.98
ECHO LA VOL 2C: 16.59 ML (ref 22–52)
ECHO LA VOL 4C: 23.12 ML (ref 22–52)
ECHO LA VOL BP: 20.4 ML (ref 22–52)
ECHO LA VOL/BSA BIPLANE: 10.51 ML/M2 (ref 16–28)
ECHO LA VOLUME INDEX A2C: 8.54 ML/M2 (ref 16–28)
ECHO LA VOLUME INDEX A4C: 11.91 ML/M2 (ref 16–28)
ECHO LV E' LATERAL VELOCITY: 11.46 CENTIMETER/SECOND
ECHO LV INTERNAL DIMENSION DIASTOLIC: 4.27 CM (ref 3.9–5.3)
ECHO LV INTERNAL DIMENSION SYSTOLIC: 2.89 CM
ECHO LV IVSD: 0.89 CM (ref 0.6–0.9)
ECHO LV MASS 2D: 126 G (ref 67–162)
ECHO LV MASS INDEX 2D: 64.9 G/M2 (ref 43–95)
ECHO LV POSTERIOR WALL DIASTOLIC: 0.8 CM (ref 0.6–0.9)
ECHO LVOT DIAM: 1.72 CM
ECHO LVOT PEAK GRADIENT: 6.8 MMHG
ECHO LVOT PEAK VELOCITY: 130.85 CM/S
ECHO LVOT SV: 72.4 ML
ECHO LVOT VTI: 31.24 CM
ECHO MV A VELOCITY: 78.15 CM/S
ECHO MV AREA PHT: 4 CM2
ECHO MV E DECELERATION TIME (DT): 191.2 MS
ECHO MV E VELOCITY: 97.48 CM/S
ECHO MV E/A RATIO: 1.25
ECHO MV PRESSURE HALF TIME (PHT): 55.4 MS
ECHO PV MAX VELOCITY: 96.12 CM/S
ECHO PV PEAK GRADIENT: 3.7 MMHG
ECHO RV INTERNAL DIMENSION: 2.77 CM
ECHO RV TAPSE: 1.98 CM (ref 1.5–2)
ECHO TV REGURGITANT MAX VELOCITY: 241.15 CM/S
ECHO TV REGURGITANT PEAK GRADIENT: 23.3 MMHG
ERYTHROCYTE [DISTWIDTH] IN BLOOD BY AUTOMATED COUNT: 12.4 % (ref 11.5–14.5)
EST. AVERAGE GLUCOSE BLD GHB EST-MCNC: 103 MG/DL
GLUCOSE SERPL-MCNC: 83 MG/DL (ref 65–100)
HBA1C MFR BLD: 5.2 % (ref 4.2–6.3)
HCT VFR BLD AUTO: 38.4 % (ref 35–47)
HDLC SERPL-MCNC: 60 MG/DL
HDLC SERPL: 3.4 {RATIO} (ref 0–5)
HGB BLD-MCNC: 12.9 G/DL (ref 11.5–16)
LDLC SERPL CALC-MCNC: 118.4 MG/DL (ref 0–100)
LIPID PROFILE,FLP: ABNORMAL
MAGNESIUM SERPL-MCNC: 2.3 MG/DL (ref 1.6–2.4)
MCH RBC QN AUTO: 32.3 PG (ref 26–34)
MCHC RBC AUTO-ENTMCNC: 33.6 G/DL (ref 30–36.5)
MCV RBC AUTO: 96 FL (ref 80–99)
NRBC # BLD: 0 K/UL (ref 0–0.01)
NRBC BLD-RTO: 0 PER 100 WBC
P-R INTERVAL, ECG05: 176 MS
PHOSPHATE SERPL-MCNC: 3.6 MG/DL (ref 2.6–4.7)
PLATELET # BLD AUTO: 156 K/UL (ref 150–400)
PMV BLD AUTO: 9.3 FL (ref 8.9–12.9)
POTASSIUM SERPL-SCNC: 4.4 MMOL/L (ref 3.5–5.1)
Q-T INTERVAL, ECG07: 360 MS
QRS DURATION, ECG06: 86 MS
QTC CALCULATION (BEZET), ECG08: 412 MS
RBC # BLD AUTO: 4 M/UL (ref 3.8–5.2)
SALICYLATES SERPL-MCNC: <1.7 MG/DL (ref 2.8–20)
SODIUM SERPL-SCNC: 140 MMOL/L (ref 136–145)
TRIGL SERPL-MCNC: 113 MG/DL (ref ?–150)
TROPONIN I SERPL-MCNC: <0.05 NG/ML
TSH SERPL DL<=0.05 MIU/L-ACNC: 2.21 UIU/ML (ref 0.36–3.74)
VENTRICULAR RATE, ECG03: 79 BPM
VLDLC SERPL CALC-MCNC: 22.6 MG/DL
WBC # BLD AUTO: 7.7 K/UL (ref 3.6–11)

## 2019-08-15 PROCEDURE — 80048 BASIC METABOLIC PNL TOTAL CA: CPT

## 2019-08-15 PROCEDURE — 99218 HC RM OBSERVATION: CPT

## 2019-08-15 PROCEDURE — 77030040361 HC SLV COMPR DVT MDII -B

## 2019-08-15 PROCEDURE — 96374 THER/PROPH/DIAG INJ IV PUSH: CPT

## 2019-08-15 PROCEDURE — 97535 SELF CARE MNGMENT TRAINING: CPT

## 2019-08-15 PROCEDURE — 83036 HEMOGLOBIN GLYCOSYLATED A1C: CPT

## 2019-08-15 PROCEDURE — 74011250637 HC RX REV CODE- 250/637: Performed by: STUDENT IN AN ORGANIZED HEALTH CARE EDUCATION/TRAINING PROGRAM

## 2019-08-15 PROCEDURE — 80061 LIPID PANEL: CPT

## 2019-08-15 PROCEDURE — 74011250637 HC RX REV CODE- 250/637: Performed by: FAMILY MEDICINE

## 2019-08-15 PROCEDURE — 83735 ASSAY OF MAGNESIUM: CPT

## 2019-08-15 PROCEDURE — 97116 GAIT TRAINING THERAPY: CPT

## 2019-08-15 PROCEDURE — 97162 PT EVAL MOD COMPLEX 30 MIN: CPT

## 2019-08-15 PROCEDURE — 84484 ASSAY OF TROPONIN QUANT: CPT

## 2019-08-15 PROCEDURE — 97165 OT EVAL LOW COMPLEX 30 MIN: CPT

## 2019-08-15 PROCEDURE — 95816 EEG AWAKE AND DROWSY: CPT | Performed by: FAMILY MEDICINE

## 2019-08-15 PROCEDURE — 84443 ASSAY THYROID STIM HORMONE: CPT

## 2019-08-15 PROCEDURE — 92610 EVALUATE SWALLOWING FUNCTION: CPT

## 2019-08-15 PROCEDURE — 85027 COMPLETE CBC AUTOMATED: CPT

## 2019-08-15 PROCEDURE — 84100 ASSAY OF PHOSPHORUS: CPT

## 2019-08-15 RX ORDER — LEVETIRACETAM 250 MG/1
750 TABLET ORAL 2 TIMES DAILY
Status: DISCONTINUED | OUTPATIENT
Start: 2019-08-15 | End: 2019-08-16 | Stop reason: HOSPADM

## 2019-08-15 RX ORDER — LAMOTRIGINE 100 MG/1
200 TABLET ORAL 2 TIMES DAILY
Status: DISCONTINUED | OUTPATIENT
Start: 2019-08-15 | End: 2019-08-16 | Stop reason: HOSPADM

## 2019-08-15 RX ORDER — CLONAZEPAM 0.5 MG/1
0.5 TABLET ORAL 3 TIMES DAILY
Status: DISCONTINUED | OUTPATIENT
Start: 2019-08-15 | End: 2019-08-15

## 2019-08-15 RX ORDER — CLONAZEPAM 0.5 MG/1
0.5 TABLET ORAL 2 TIMES DAILY
Status: DISCONTINUED | OUTPATIENT
Start: 2019-08-15 | End: 2019-08-16 | Stop reason: HOSPADM

## 2019-08-15 RX ORDER — CARBAMAZEPINE 200 MG/1
200 CAPSULE, EXTENDED RELEASE ORAL EVERY EVENING
Status: DISCONTINUED | OUTPATIENT
Start: 2019-08-15 | End: 2019-08-16 | Stop reason: HOSPADM

## 2019-08-15 RX ORDER — CARBAMAZEPINE 300 MG/1
300 CAPSULE, EXTENDED RELEASE ORAL 2 TIMES DAILY
Status: DISCONTINUED | OUTPATIENT
Start: 2019-08-15 | End: 2019-08-15

## 2019-08-15 RX ORDER — CARBAMAZEPINE 300 MG/1
300 CAPSULE, EXTENDED RELEASE ORAL DAILY
Status: DISCONTINUED | OUTPATIENT
Start: 2019-08-16 | End: 2019-08-16 | Stop reason: HOSPADM

## 2019-08-15 RX ADMIN — CLONAZEPAM 0.5 MG: 0.5 TABLET ORAL at 09:25

## 2019-08-15 RX ADMIN — ACETAMINOPHEN 650 MG: 325 TABLET ORAL at 09:32

## 2019-08-15 RX ADMIN — LEVETIRACETAM 750 MG: 250 TABLET ORAL at 20:50

## 2019-08-15 RX ADMIN — Medication 10 ML: at 06:11

## 2019-08-15 RX ADMIN — LAMOTRIGINE 200 MG: 100 TABLET ORAL at 01:59

## 2019-08-15 RX ADMIN — CARBAMAZEPINE 300 MG: 300 CAPSULE, EXTENDED RELEASE ORAL at 09:26

## 2019-08-15 RX ADMIN — CLONAZEPAM 0.5 MG: 0.5 TABLET ORAL at 18:48

## 2019-08-15 RX ADMIN — Medication 10 ML: at 21:07

## 2019-08-15 RX ADMIN — CARBAMAZEPINE 300 MG: 300 CAPSULE, EXTENDED RELEASE ORAL at 01:59

## 2019-08-15 RX ADMIN — LEVETIRACETAM 750 MG: 250 TABLET ORAL at 09:25

## 2019-08-15 RX ADMIN — LEVETIRACETAM 750 MG: 250 TABLET ORAL at 01:59

## 2019-08-15 RX ADMIN — LAMOTRIGINE 200 MG: 100 TABLET ORAL at 21:04

## 2019-08-15 RX ADMIN — LAMOTRIGINE 200 MG: 100 TABLET ORAL at 09:26

## 2019-08-15 RX ADMIN — CARBAMAZEPINE 200 MG: 200 CAPSULE, EXTENDED RELEASE ORAL at 19:57

## 2019-08-15 NOTE — PROGRESS NOTES
Problem: Mobility Impaired (Adult and Pediatric)  Goal: *Acute Goals and Plan of Care (Insert Text)  Description  FUNCTIONAL STATUS PRIOR TO ADMISSION: Patient was independent and active without use of DME. She reports occasional usage of grandchildren (16 yo's) when she has her seizure episodes. HOME SUPPORT PRIOR TO ADMISSION: The patient lived with daughter who works during the day, 8 grandchildren, boyfriend and 2 of her own children. [facility] Goals  Initiated 8/15/2019  1. Patient will move from supine to sit and sit to supine  in bed with supervision/set-up within 7 day(s). 2.  Patient will transfer from bed to chair and chair to bed with supervision/set-up using the least restrictive device within 7 day(s). 3.  Patient will perform sit to stand with supervision/set-up within 7 day(s). 4.  Patient will ambulate with supervision/set-up for 150 feet with the least restrictive device within 7 day(s). 5.  Patient will ascend/descend 6 stairs with 1 handrail(s) with minimal assistance/contact guard assist within 7 day(s). Outcome: Progressing Towards Goal  Note:   PHYSICAL THERAPY EVALUATION  Patient: Angelina Negrete (39 y.o. female)  Date: 8/15/2019  Primary Diagnosis: Stroke ([address] [I63.9]  Complex partial seizure ([address] [G40.209]  Dysarthria [R47.1]  Stroke ([address] [I63.9]  Complex partial seizure ([address] [G40.209]  Dysarthria [R47.1]        Precautions:   Fall, Seizure      ASSESSMENT  Based on the objective data described below, the patient presents with decreased balance and coordination following admission for possible seizure vs. Stroke rule out. Patient has a history of seizures. She reports her neurologist just recently changed one of her seizure medications. CT and MRI are negative for an acute infarct. She was initially found to have hand tremors on ED MD notes, but patient did not demonstrate during current session.   Patient overall MIN A for bed level activity, she demonstrates good sitting balance (static and dynamic) but required MOD A for all upright activity due to increased trunk sway decreased balance and coordination. She requires verbal cuing fo improved LE sequence for safety. Would benefit from RW at next PT session. Current Level of Function Impacting Discharge (mobility/balance): MIN A for bed mobility, MOD A for sit-stand and ambulation for short distance of 50 feet. Trunk sway increased unable to perform upright activity without external support    Functional Outcome Measure: The patient scored 7/28 on the Tinetti outcome measure which is indicative of high fall risk. Other factors to consider for discharge: has grandchildren to assist that are 16years old, however not good family support- daughter works during the day. Multiple young children in the home which patient reports she \"watches\"     Patient will benefit from skilled therapy intervention to address the above noted impairments. PLAN :  Recommendations and Planned Interventions: bed mobility training, transfer training, gait training, therapeutic exercises, neuromuscular re-education and therapeutic activities      Frequency/Duration: Patient will be followed by physical therapy:  5 times a week to address goals. Recommendation for discharge: (in order for the patient to meet his/her long term goals)  Physical therapy at least 2 days/week in the home AND ensure assist and/or supervision for safety with assistive device vs SNF     This discharge recommendation:  A follow-up discussion with the attending provider and/or case management is planned    Equipment recommendations for successful discharge (if) home: may need RW          SUBJECTIVE:   Patient stated i feel like I am drunk.     OBJECTIVE DATA SUMMARY:   HISTORY:    Past Medical History:   Diagnosis Date    Degenerative disc disease, cervical     C2 C3    Headache     Ill-defined condition     anxiety    Seizures ([facility] [address] daily petite mal seizure    UTI (urinary tract infection) 05/22/2017     Past Surgical History:   Procedure Laterality Date    HX ORTHOPAEDIC  2014    right knee       Personal factors and/or comorbidities impacting plan of care: see below    Home Situation  Home Environment: Private residence  # Steps to Enter: 6  Rails to Enter: Yes  Hand Rails : Right  Wheelchair Ramp: No  One/Two Story Residence: Two story  # of Interior Steps: 12  Living Alone: No  Support Systems: Family member(s)(8 grandchildren (2 assist with care) and 2 of her own childr)  Patient Expects to be Discharged to[de-identified] Private residence  Current DME Used/Available at Home: None  Tub or Shower Type: Shower    EXAMINATION/PRESENTATION/DECISION MAKING:   Vitals     Blood pressure Heart rate(bpm) Oxygen saturation (room air)  Pre-activity  126/76   85   98%  Sitting   130/71   86   98%  Post activity  145/77   86   96%  Critical Behavior:  Neurologic State: Alert  Orientation Level: Oriented X4  Cognition: Appropriate decision making  Safety/Judgement: Awareness of environment  Hearing:     Skin:  all exposed intact  Edema: none noted  Range Of Motion:  AROM: Within functional limits           PROM: Within functional limits           Strength:    Strength: Generally decreased, functional                    Tone & Sensation:   Tone: Normal              Sensation: Intact               Coordination:  Coordination: Generally decreased, functional  Vision:      Functional Mobility:  Bed Mobility:     Supine to Sit: Minimum assistance  Sit to Supine: Supervision     Transfers:  Sit to Stand: Minimum assistance  Stand to Sit: Minimum assistance        Bed to Chair: Moderate assistance              Balance:   Sitting: Intact  Standing: Impaired  Standing - Static: Constant support  Standing - Dynamic : Constant support  Ambulation/Gait Training:  Distance (ft): 50 Feet (ft)  Assistive Device: Gait belt  Ambulation - Level of Assistance:  Moderate assistance; Additional time     Gait Description (WDL): Exceptions to WDL  Gait Abnormalities: Decreased step clearance;Trunk sway increased; Path deviations                               Functional Measure:  Tinetti test:    Sitting Balance: 1  Arises: 0  Attempts to Rise: 0  Immediate Standing Balance: 0  Standing Balance: 0  Nudged: 0  Eyes Closed: 0  Turn 360 Degrees - Continuous/Discontinuous: 0  Turn 360 Degrees - Steady/Unsteady: 0  Sitting Down: 1  Balance Score: 2  Indication of Gait: 0  R Step Length/Height: 1  L Step Length/Height: 1  R Foot Clearance: 1  L Foot Clearance: 1  Step Symmetry: 1  Step Continuity: 0  Path: 0  Trunk: 0  Walking Time: 0  Gait Score: 5  Total Score: 7         Tinetti Tool Score Risk of Falls  <19 = High Fall Risk  19-24 = Moderate Fall Risk  25-28 = Low Fall Risk  Tinetti ME. Performance-Oriented Assessment of Mobility Problems in Elderly Patients. [facility]; [id number].  (Scoring Description: PT Bulletin Feb. 10, 1993)    Older adults: Leigha Pressley et al, 2009; n = 1000 [facility] elderly evaluated with ABC, RIKA, ADL, and IADL)  · Mean RIKA score for males aged 69-68 years = 26.21(3.40)  · Mean RIKA score for females age 69-68 years = 25.16(4.30)  · Mean RIKA score for males over 80 years = 23.29(6.02)  · Mean RIKA score for females over 80 years = 17.20(8.32)           Physical Therapy Evaluation Charge Determination   History Examination Presentation Decision-Making   HIGH Complexity :3+ comorbidities / personal factors will impact the outcome/ POC  HIGH Complexity : 4+ Standardized tests and measures addressing body structure, function, activity limitation and / or participation in recreation  MEDIUM Complexity : Evolving with changing characteristics  Other outcome measures Tinetti  HIGH       Based on the above components, the patient evaluation is determined to be of the following complexity level: MEDIUM    Pain Rating:  None reported    Activity Tolerance:   Fair  Please refer to the flowsheet for vital signs taken during this treatment. After treatment patient left in no apparent distress:   Supine in bed, Call bell within reach and Bed / chair alarm activated    COMMUNICATION/EDUCATION:   The patients plan of care was discussed with: Registered Nurse. Fall prevention education was provided and the patient/caregiver indicated understanding., Patient/family have participated as able in goal setting and plan of care. and Patient/family agree to work toward stated goals and plan of care.     Thank you for this referral.  Batool Deleon, PT, DPT   Time Calculation: 20 mins

## 2019-08-15 NOTE — PROGRESS NOTES
[facility]: MED RECONCILIATION    Comments/Recommendations:   Patient reports good compliance with medications although she was not able to take her evening and night meds today. Patient reports that she had been taking lamotrigine 200 mg ODT tablets twice daily until, but d/t unavailability/insurance her prescription had to be changed to lamotrigine 200 mg (regular formulation tablets). This occurred on 8/1/19 as confirmed by Rx query. She wonders if this is what caused her current issues. Medications added:     Vitamin C 250 mg PO QPM at 1700  Nystatin cream daily PRN for rash/itching    Medications removed:    Norco PRN  Vitron-C - takes plain Vitamin C  Omeprazole 20 mg PO daily    Medications adjusted:    none    Information obtained from: patient, Rx query    Significant PMH/Disease States:   Past Medical History:   Diagnosis Date    Degenerative disc disease, cervical     C2 C3    Headache     Ill-defined condition     anxiety    Seizures ([facility] [address]     daily petite mal seizure    UTI (urinary tract infection) 05/22/2017     Chief Complaint for this Admission:   Chief Complaint   Patient presents with    Dysarthria     Allergies: Aspirin; Codeine; and Phenobarbital    Prior to Admission Medications:     Medication Documentation Review Audit       Reviewed by Bri Holly, PHARMD (Pharmacist) on 08/14/19 at 2238      Medication Sig Documenting Provider Last Dose Status Taking?   ascorbic acid, vitamin C, (VITAMIN C) 250 mg tablet Take 250 mg by mouth every evening. 1700 Provider, Historical 8/13/2019 pm Active Yes   carBAMazepine ER (CARBATROL ER) 300 mg capsule Take 300 mg by mouth two (2) times a day. 0900 and 2100 Provider, Historical 8/14/2019 am Active Yes   cholecalciferol, VITAMIN D3, (VITAMIN D3) 5,000 unit tab tablet Take 5,000 Units by mouth every evening. 1700 Provider, Historical 8/13/2019 pm Active Yes   clonazePAM (KLONOPIN) 0.5 mg tablet Take 0.5 mg by mouth three (3) times daily.  0900, 1700, 2100 Provider, Historical 8/14/2019 am Active Yes   lamoTRIgine (LAMICTAL) 200 mg tablet Take 200 mg by mouth two (2) times a day. 0900 and 2100 Provider, Historical 8/14/2019 am Active Yes   levETIRAcetam (KEPPRA) 750 mg tablet Take 750 mg by mouth two (2) times a day. 0900 and 2100 Provider, Historical 8/14/2019 am Active Yes   nystatin (MYCOSTATIN) topical cream Apply  to affected area daily as needed for Skin Irritation. Provider, Historical  Active Yes                Thank you for the consult,  Nikhil PEÑALOZA [facility]

## 2019-08-15 NOTE — PROGRESS NOTES
0715 - Bedside and Verbal shift change report given to damari bejarano (oncoming nurse) by Alexandr Aiken (offgoing nurse). Report included the following information SBAR, Kardex, ED Summary, Intake/Output, MAR, Recent Results and Cardiac Rhythm NSR.   1132 - patient off the unit for EEG.   1225 - patient back on unit from EEG. 1900 - Bedside and Verbal shift change report given to Sania (oncoming nurse) by Bret Smith (offgoing nurse). Report included the following information SBAR, Kardex, ED Summary, Intake/Output, MAR, Recent Results and Cardiac Rhythm NSR.

## 2019-08-15 NOTE — H&P
[address] [facility] [address] [address]   Office [phone]  Fax [phone]       Admission H&P     Name: Jie Liu MRN: [id number]  Sex: Female   YOB: 1965  Age: 47 y.o. PCP: Stacy Melgoza NP     Source of Information: patient, medical records and Pt daughter     Chief complaint: dysarthria and ataxia    History of Present Illness  Jie Liu is a 47 y.o. female with known h/o complex partial seziure (followed by [facility] Dr. Vignesh Lewis- last follow up was 4 months ago), and cervical spinal stenosis, who presents to the ER complaining of 5 day h/o slurred speech, ataxia, weakness, and blurry vision. Pt denies fall, syncope or LOC. States \"I feel like I am talking like I am drunk. \" She is prescribed Lamictal 200 BID, Keppra 759 BID and Tegretol 300 BID for her seizure history. Notes that her neurologist switched her from Lamictal chewable tablets to oral tablets about a week ago. Dose did not change. Pt denies any fever, chills, chest pain, SOB, abdominal pain, n/v/d, dysuria, numbness or tingling in arms or legs. In the ER,  - vital signs were remarkable for  HTN  -Labs were wnl   -CT Head w/o contrast showed no acute abnormality, no intracranial hemorrhage. -CXR showed no acute processes. -EKG NSR with rate of 79   - Pt was not treated with TPA and FMS was consulted. Past Medical History:   Diagnosis Date    Degenerative disc disease, cervical     C2 C3    Headache     Ill-defined condition     anxiety    Seizures (HCC)     daily petite mal seizure    UTI (urinary tract infection) 05/22/2017      Patient Vitals for the past 12 hrs:   Temp Pulse Resp BP SpO2   08/14/19 2045    161/88 99 %   08/14/19 1756 97.7 °F (36.5 °C) 84 14 154/74 98 %       Home Medications   Prior to Admission medications    Medication Sig Start Date End Date Taking?  Authorizing Provider   levETIRAcetam (KEPPRA) 750 mg tablet Take 750 mg by mouth two (2) times a day. 0900 and 2100   Yes Provider, Historical   ascorbic acid, vitamin C, (VITAMIN C) 250 mg tablet Take 250 mg by mouth every evening. 1700   Yes Provider, Historical   nystatin (MYCOSTATIN) topical cream Apply  to affected area daily as needed for Skin Irritation. Yes Provider, Historical   cholecalciferol, VITAMIN D3, (VITAMIN D3) 5,000 unit tab tablet Take 5,000 Units by mouth every evening. 1700   Yes Provider, Historical   carBAMazepine ER (CARBATROL ER) 300 mg capsule Take 300 mg by mouth two (2) times a day. 0900 and 2100   Yes Provider, Historical   lamoTRIgine (LAMICTAL) 200 mg tablet Take 200 mg by mouth two (2) times a day. 0900 and 2100   Yes Provider, Historical   clonazePAM (KLONOPIN) 0.5 mg tablet Take 0.5 mg by mouth three (3) times daily. 0900, 1700, 2100   Yes Provider, Historical       Allergies  Allergies   Allergen Reactions    Aspirin Other (comments)     Chest pain.  Has taken ibuprofen ,diclofenac before    Codeine Other (comments)     Chest pain      Phenobarbital Other (comments)     hallucinate       Past Medical History:   Diagnosis Date    Degenerative disc disease, cervical     C2 C3    Headache     Ill-defined condition     anxiety    Seizures (HCC)     daily petite mal seizure    UTI (urinary tract infection) 05/22/2017       Past Surgical History:   Procedure Laterality Date    HX ORTHOPAEDIC  2014    right knee       Family History   Problem Relation Age of Onset    Hypertension Father     Diabetes Maternal Grandmother     Emphysema Mother        Social History  Social History     Socioeconomic History    Marital status: SINGLE     Spouse name: Not on file    Number of children: Not on file    Years of education: Not on file    Highest education level: Not on file   Occupational History    Not on file   Social Needs    Financial resource strain: Not on file    Food insecurity:     Worry: Not on file     Inability: Not on file    Transportation needs: Medical: Not on file     Non-medical: Not on file   Tobacco Use    Smoking status: Never Smoker    Smokeless tobacco: Never Used   Substance and Sexual Activity    Alcohol use: No    Drug use: No    Sexual activity: Never   Lifestyle    Physical activity:     Days per week: Not on file     Minutes per session: Not on file    Stress: Not on file   Relationships    Social connections:     Talks on phone: Not on file     Gets together: Not on file     Attends Holiness service: Not on file     Active member of club or organization: Not on file     Attends meetings of clubs or organizations: Not on file     Relationship status: Not on file    Intimate partner violence:     Fear of current or ex partner: Not on file     Emotionally abused: Not on file     Physically abused: Not on file     Forced sexual activity: Not on file   Other Topics Concern    Not on file   Social History Narrative    Not on file       Alcohol history: Not at all  Smoking history: Non-smoker  Illicit drug history: Not at all    Living arrangement: patient lives with their spouse. Ambulates: Independently     Review of Systems: (bolded are positive):   General Negative for fever, chills, changes in weight, changes in appetite   HEENT Negative for hearing loss, earache, congestion, sore throat. Positive for blurry vision   CV Negative for chest pain, palpitations, edema   Respiratory Negative for cough, shortness of breath, wheezing   GI Negative for change in bowel habits, abdominal pain, black or bloody stools, nausea or vomiting    Negative for frequency, dysuria, hematuria,    MSK Negative for back pain, joint pain, muscle pain       Skin Negative for itching, rash   Neuro Negative for dizziness, headache, confusion, numbness or tingling.  Positive for weakness, slurred speech    Psych Negative for anxiety, depression, change in mood         Physical Exam  Objective    O2 Device: Room air     General:   Alert, cooperative, no acute distress   Head:   Atraumatic   Eyes:   Conjunctivae clear, PERRL    ENT:  Oral mucosa normal   Neck:  Supple, trachea midline, no adenopathy   No JVD       Lungs:   Clear to auscultation bilaterally    Heart:   Regular rhythm, no murmur   Abdomen:    Soft, non-tender   No masses or organomegaly   Bowel sounds present    Extremities:  No edema or DVT signs. Positive flapping motion of bilateral hands (astericsis vs tremor?)    Skin:  Warm and dry    No rashes or lesions   Neurologic:  Oriented x3  Speech appears to be more labile (confirmed by daughter this is not Pt's baseline)     No focal deficits  CN 2-12 intact. Strength 5/5 BUE and BLE   No pronator drift            Laboratory Data  Recent Results (from the past 8 hour(s))   GLUCOSE, POC    Collection Time: 08/14/19  5:54 PM   Result Value Ref Range    Glucose (POC) 108 (H) 65 - 100 mg/dL    Performed by Tory Llanes    CBC WITH AUTOMATED DIFF    Collection Time: 08/14/19  6:02 PM   Result Value Ref Range    WBC 7.6 3.6 - 11.0 K/uL    RBC 4.05 3.80 - 5.20 M/uL    HGB 12.9 11.5 - 16.0 g/dL    HCT 38.0 35.0 - 47.0 %    MCV 93.8 80.0 - 99.0 FL    MCH 31.9 26.0 - 34.0 PG    MCHC 33.9 30.0 - 36.5 g/dL    RDW 12.4 11.5 - 14.5 %    PLATELET 149 062 - 936 K/uL    MPV 9.3 8.9 - 12.9 FL    NRBC 0.0 0  WBC    ABSOLUTE NRBC 0.00 0.00 - 0.01 K/uL    NEUTROPHILS 61 32 - 75 %    LYMPHOCYTES 31 12 - 49 %    MONOCYTES 6 5 - 13 %    EOSINOPHILS 1 0 - 7 %    BASOPHILS 0 0 - 1 %    IMMATURE GRANULOCYTES 1 (H) 0.0 - 0.5 %    ABS. NEUTROPHILS 4.6 1.8 - 8.0 K/UL    ABS. LYMPHOCYTES 2.3 0.8 - 3.5 K/UL    ABS. MONOCYTES 0.5 0.0 - 1.0 K/UL    ABS. EOSINOPHILS 0.1 0.0 - 0.4 K/UL    ABS. BASOPHILS 0.0 0.0 - 0.1 K/UL    ABS. IMM.  GRANS. 0.1 (H) 0.00 - 0.04 K/UL    DF AUTOMATED     METABOLIC PANEL, COMPREHENSIVE    Collection Time: 08/14/19  6:02 PM   Result Value Ref Range    Sodium 140 136 - 145 mmol/L    Potassium 4.8 3.5 - 5.1 mmol/L    Chloride 107 97 - 108 mmol/L    CO2 30 21 - 32 mmol/L    Anion gap 3 (L) 5 - 15 mmol/L    Glucose 100 65 - 100 mg/dL    BUN 8 6 - 20 MG/DL    Creatinine 0.90 0.55 - 1.02 MG/DL    BUN/Creatinine ratio 9 (L) 12 - 20      GFR est AA >60 >60 ml/min/1.73m2    GFR est non-AA >60 >60 ml/min/1.73m2    Calcium 9.6 8.5 - 10.1 MG/DL    Bilirubin, total 0.2 0.2 - 1.0 MG/DL    ALT (SGPT) 21 12 - 78 U/L    AST (SGOT) 22 15 - 37 U/L    Alk. phosphatase 126 (H) 45 - 117 U/L    Protein, total 7.5 6.4 - 8.2 g/dL    Albumin 3.5 3.5 - 5.0 g/dL    Globulin 4.0 2.0 - 4.0 g/dL    A-G Ratio 0.9 (L) 1.1 - 2.2     EKG, 12 LEAD, INITIAL    Collection Time: 08/14/19  6:41 PM   Result Value Ref Range    Ventricular Rate 79 BPM    Atrial Rate 79 BPM    P-R Interval 176 ms    QRS Duration 86 ms    Q-T Interval 360 ms    QTC Calculation (Bezet) 412 ms    Calculated P Axis 20 degrees    Calculated R Axis 48 degrees    Calculated T Axis 61 degrees    Diagnosis       Normal sinus rhythm  Normal ECG  When compared with ECG of 11-JUN-2017 22:40,  Vent.  rate has decreased BY  53 BPM     ETHYL ALCOHOL    Collection Time: 08/14/19  8:45 PM   Result Value Ref Range    ALCOHOL(ETHYL),SERUM <10 <10 MG/DL   DRUG SCREEN, URINE    Collection Time: 08/14/19  8:57 PM   Result Value Ref Range    AMPHETAMINES NEGATIVE  NEG      BARBITURATES NEGATIVE  NEG      BENZODIAZEPINES NEGATIVE  NEG      COCAINE NEGATIVE  NEG      METHADONE NEGATIVE  NEG      OPIATES NEGATIVE  NEG      PCP(PHENCYCLIDINE) NEGATIVE  NEG      THC (TH-CANNABINOL) NEGATIVE  NEG      Drug screen comment (NOTE)    URINALYSIS W/MICROSCOPIC    Collection Time: 08/14/19  8:57 PM   Result Value Ref Range    Color YELLOW/STRAW      Appearance CLEAR CLEAR      Specific gravity 1.012 1.003 - 1.030      pH (UA) 7.0 5.0 - 8.0      Protein NEGATIVE  NEG mg/dL    Glucose NEGATIVE  NEG mg/dL    Ketone NEGATIVE  NEG mg/dL    Bilirubin NEGATIVE  NEG      Blood NEGATIVE  NEG      Urobilinogen 1.0 0.2 - 1.0 EU/dL    Nitrites NEGATIVE  NEG Leukocyte Esterase SMALL (A) NEG      WBC 5-10 0 - 4 /hpf    RBC 0-5 0 - 5 /hpf    Epithelial cells FEW FEW /lpf    Bacteria 1+ (A) NEG /hpf    Hyaline cast 0-2 0 - 5 /lpf   URINE CULTURE HOLD SAMPLE    Collection Time: 08/14/19  8:57 PM   Result Value Ref Range    Urine culture hold        URINE ON HOLD IN MICROBIOLOGY DEPT FOR 3 DAYS. IF UNPRESERVED URINE IS SUBMITTED, IT CANNOT BE USED FOR ADDITIONAL TESTING AFTER 24 HRS, RECOLLECTION WILL BE REQUIRED. Imaging  CXR Results  (Last 48 hours)               08/14/19 1901  XR CHEST PA LAT Final result    Impression:  IMPRESSION: No acute process identified                       Narrative:  EXAM:  XR CHEST PA LAT       INDICATION:   dizzy       COMPARISON: 2017. FINDINGS: PA and lateral radiographs of the chest demonstrate clear lungs. The   cardiac and mediastinal contours and pulmonary vascularity are normal.  Bony   structures are intact. 2 fusion changes are noted cervical spine. Report delayed   by slowness of PACS and batching of films               CT Results  (Last 48 hours)               08/14/19 2209  CTA HEAD NECK W CONT Final result    Impression:  IMPRESSION: No aneurysm or stenosis identified. . Common carotid bifurcations are   within normal limits. Vertebral arteries are patent. Narrative:  EXAM: CTA HEAD NECK W CONT       INDICATION: CVA workup       COMPARISON: None. CONTRAST: 100 mL of .       TECHNIQUE:  Unenhanced  images were obtained to localize the volume for   acquisition. Multislice helical axial CT angiography was performed from the   aortic arch to the top of the head during uneventful rapid bolus intravenous   contrast administration. Coronal and sagittal reformations and 3D post   processing was performed. CT dose reduction was achieved through use of a   standardized protocol tailored for this examination and automatic exposure   control for dose modulation.          FINDINGS:       CTA NECK   There is conventional three vessel arch anatomy. The bilateral subclavian,   common carotid, and internal carotid arteries are patent with no flow-limiting   stenosis. % of right carotid artery stenosis: No stenosis   % of left carotid artery stenosis: No stenosis       NASCET method was utilized for calculating stenosis. The vertebral arteries are codominant and patent. The cervical soft tissues are   unremarkable. Anterior fusion changes are noted C5, C6-C7 levels. There is mild   prominence of the palatine tonsils. CTA HEAD   The vertebral arteries are codominant. The basilar artery and its branches are   normal. The internal carotid, anterior cerebral, and middle cerebral arteries   are patent. There is no flow-limiting intracranial stenosis. There is no   aneurysm. There are bilateral posterior communicating arteries. Clemencia Valadez 08/14/19 1812  CT HEAD WO CONT Final result    Impression:  IMPRESSION: No acute abnormality identified               Narrative:  EXAM: CT HEAD WO CONT       INDICATION: vision changes, slurred speech, trouble walking       COMPARISON: None. CONTRAST: None. TECHNIQUE: Unenhanced CT of the head was performed using 5 mm images. Brain and   bone windows were generated. CT dose reduction was achieved through use of a   standardized protocol tailored for this examination and automatic exposure   control for dose modulation. . Report delayed by slowness of PACS and batching of   films. FINDINGS:   The ventricles and sulci are normal in size, shape and configuration and   midline. There is no significant white matter disease. There is no intracranial   hemorrhage, extra-axial collection, mass, mass effect or midline shift. The   basilar cisterns are open. No acute infarct is identified. The bone windows   demonstrate no abnormalities. The visualized portions of the paranasal sinuses   and mastoid air cells are clear.                  EKG: normal EKG, normal sinus rhythm, unchanged from previous tracings. Assessment and Plan     Josef Araiza is a 47 y.o. female who is admitted for CVA and dysarthria with h/o complex partial seizures (followed by [facility]). 1. CVA/TIA: not a candidate for TPA. NIHSS 1  -CT Head w/o contrast showed no acute abnormality, no intracranial hemorrhage. -CXR showed no acute processes. -EKG NSR with rate of 79  -Echo, CTA head and neck and Brain MRI w/o contrast results pending  -CBC, BMP, phosphorus, Mag, TSH  -Lipid (LDL goal <70)   -Troponin q6hrs   - UDS & EtOH negative  -continuous neuro checks q4h   -Neurology consult ordered and all recommendations appreciated. -PT/OT/Speech and CW consult       2. Complex partial seizures: (followed by [facility] Dr. Bakari Jiménez) baseline is 2-3 seizures per day. Do not have [facility] records at this time.    -restart home Carbamazepine, Lamictal, and Keppra. -Neurology consult ordered and all recommendations appreciated. 3. Asterixis vs tremor  -Bilateral flapping motion of hands noted on exam.  could be side effect of P's seizure medication   -amonia level pending. Obesity BMI Body mass index is 35.78 kg/m². - Encouraging lifestyle modifications and further follow up outpatient. FEN/GI - Regular diet. Activity - Ambulate with assistance  DVT prophylaxis - SCDs  GI prophylaxis - Not indicated at this time  Fall prophylaxis - Not indicated at this time. Disposition - Admit to Telemetry. Plan to d/c to Home. Code Status - Full, discussed with patient / caregivers.   Next of Kin Name and Contact Daughter     Patient Josef Araiza will be discussed Dr. Jesus Orozco.     10:17 PM, 08/15/19  Natividad Trammell DO  Family Medicine Resident       For Billing    Chief Complaint   Patient presents with   [facility] Problems  Date Reviewed: 6/12/2017          Codes Class Noted POA    Stroke [facility]) ICD-10-CM: I63.9  ICD-9-CM: 434.91  8/14/2019 Unknown        Dysarthria ICD-10-CM: R47.1  ICD-9-CM: 784.51  8/14/2019 Unknown        Complex partial seizure ([facility] [address] ICD-10-CM: W74.321  ICD-9-CM: 345.40  8/14/2019 Unknown

## 2019-08-15 NOTE — PROGRESS NOTES
8/15/2019  10:59 AM  Case management note    Reason for Admission:   Seizure vs stroke  Patient came to hospital for slurred speech. She has history of seizures and recent cervical spinal stenosis surgery  Patient is independent and lives with boyfriend. She does not drive but does most ADL's independently  She is currently staying with daughter and helping with children. Patient is able to do steps. [facility] [address] [facility]                   RRAT Score:          9           Plan for utilizing home health: To be determined by PT/OT                    Current Advanced Directive/Advance Care Plan:  Full code, does not have advance directive                         Transition of Care Plan:                      1. Home with family assistance  2. PCP follow up  3. Advance Directive planning (patient indicated she would like to do one while here  4. CM to follow until discharge    Care Management Interventions  PCP Verified by CM: Yes(nargis santiago np)  Mode of Transport at Discharge: Self  Transition of Care Consult (CM Consult): Discharge Planning  Current Support Network:  Other(lives with fiance but staying recently at daughters home)  Confirm Follow Up Transport: Family  Plan discussed with Pt/Family/Caregiver: Yes  Discharge Location  Discharge Placement: Home with family assistance  Jason Morgan

## 2019-08-15 NOTE — CONSULTS
[facility]: [id number] [address] Neurology  [address] [facility]  [phone]        Name:   Mikayla Wong   Medical record #: [id number]  Admission Date: 8/14/2019     Who Consulted: Dr. Jessie Velez    Reason for Consult:  CVA/TIA rule out    HISTORY OF PRESENT ILLNESS:     This is a 47 y.o. female who is admitted for dysarthria, blurred vision, balance problems for 4 days. The Neurology Service is asked to evaluate for CVA/TIA rule out. PMHx of complex partial seziure (followed by [facility] Dr. Estefany Vazquez), and cervical spinal stenosis. For her seizure history she is prescribed and takes Lamictal 200 BID (neurologist switched her from chewable tablets to oral tablets about a week ago), Keppra 759 BID and Tegretol 300 BID. Pt states she has been having symptoms (slurred speech and ataxia, and balance problems) for 4 days. She presented to ED. Code stroke called. Not a TPA candidate. Neuro-imaging:     CT Head:   FINDINGS:  The ventricles and sulci are normal in size, shape and configuration and  midline. There is no significant white matter disease. There is no intracranial  hemorrhage, extra-axial collection, mass, mass effect or midline shift. The  basilar cisterns are open. No acute infarct is identified. The bone windows  demonstrate no abnormalities. The visualized portions of the paranasal sinuses  and mastoid air cells are clear.     IMPRESSION  IMPRESSION: No acute abnormality identified    CTA Head and Neck:   FINDINGS:     CTA NECK  There is conventional three vessel arch anatomy. The bilateral subclavian,  common carotid, and internal carotid arteries are patent with no flow-limiting  stenosis.     % of right carotid artery stenosis: No stenosis  % of left carotid artery stenosis: No stenosis     NASCET method was utilized for calculating stenosis.      The vertebral arteries are codominant and patent. The cervical soft tissues are  unremarkable.   Anterior fusion changes are noted C5, C6-C7 levels. There is mild  prominence of the palatine tonsils.     CTA HEAD  The vertebral arteries are codominant. The basilar artery and its branches are  normal. The internal carotid, anterior cerebral, and middle cerebral arteries  are patent. There is no flow-limiting intracranial stenosis. There is no  aneurysm. There are bilateral posterior communicating arteries. .     IMPRESSION  IMPRESSION: No aneurysm or stenosis identified. . Common carotid bifurcations are  within normal limits. Vertebral arteries are patent. MRI Brain:   FINDINGS:  The ventricles are normal in size and position. There is no acute infarct,  hemorrhage, extra-axial fluid collection, or mass effect. There is no cerebellar  tonsillar herniation. Expected arterial flow-voids are present.     The paranasal sinuses, mastoid air cells, and middle ears are clear. The orbital  contents are within normal limits. No significant osseous or scalp lesions are  identified.     IMPRESSION  IMPRESSION:   No acute abnormality identified    Care Plan discussed with:  Patient x   Family    RN    Care Manager    Consultant/Specialist:         Thank you for allowing the Neurology Service the pleasure of participating in the care of your patient. This patient will be discussed with my collaborating care team physician Dr. Rk Slade and he may have further recommendations regarding this patient's care      Khadijah Mckinley MD    ====================    Attending Attestation:         NEUROLOGY ATTENDING ADDENDUM:    August 15, 2019    Pt personally seen and examined. Chart reviewed.     Agree with Dr Farrah Baptiste (PGY-3, [facility]) history, exam, and and  A/P with changes/additions. Reviewed chart. Pt seen by my colleage Dr Brea Villa once in Feb 2017 for seizure evaluation. She had been followed at [address] [place] [address] for the prior 20 years and was on Keppra 750 BID, Lamictal 200 mg BID, Carbamazepine 300 mg BID.  She reported having daily random staring episodes associated with muscle tension and feeling faint/ scared, all lasting for several minutes followed by being postictal for several seconds then has to urinate. She described other episodes of GTC but none in years. He had no access to prior EEGs or MRIs, and she had upcoming appt with [facility] so he advised her to continue care with them. Pt came to ER for evaluation of 4 days of slurred speech, gait unsteadiness, and blurred vision. She tells me her speech has been \"stuttering. \"  She had a recent med change where the formulation (not dosage) of her lamictal was changed about a week prior. MRI Brain was done: normal, no evidence of stroke. EEG was done today and that was a normal awake and drowsy study. Keppra and Lamictal levels PENDING  Carbamazepine level is 15.4, mildly elevated (rr 4-12)     Patient Vitals for the past 4 hrs:   Pulse Resp BP SpO2   08/15/19 1400 73 15 112/68 99 %   08/15/19 1326   113/60    08/15/19 1300 68 13 113/60 94 %         Exam   General: awake, resting, conversant. CN: EOMI, Face symmetric, Facial sensation intact bilaterally, Hearing grossly normal, Language normal (no aphasia, no dysarthria), Tongue Midline, Shrug symmetric. Motor: 5/ 5 in all exts. Sensory: intact LT, vibration in all extremities. Cerebellar: Normal FNF bilaterally. DTRs: 2+ in all exts. Gait: not tested. Impression/ Plan    47 y.o. female with prior dx of Epilepsy    No recent convulsive seizures. Pt reports stuttering/ slurred speech, gait unsteadiness, and blurry vision. D/w pt that MRI and EEG are normal.  Carbamazepine level is mildly elevated at 15.4. Cannot reduce break current Rx to reduce dosage (Carbamazepine  mg tablet). Recommend Changing to Carbamazepine  mg tabs, 3 tabs in AM and 2 tabs in PM and follow up with [facility]. Thank you for the consultation.     Signed By: Jeff Sargent MD     August 15, 2019 Assessment/Plan:     1. CVA/TIA r/o:  Blurry vision is still present but has not worsened. Imaging studies are all unremarkable   · BP Goal: Less than 160  · Neurochecks:  Every 4 hours   · Check Lamictal level  · ECHO  · EEG    2. Mobility:   · Has been/not been OOB. · PT/OT to eval for rehab    3. Diet:    · Does need SLP    4. VTE prophylaxis  · Per the primary team     Review of Systems: 10 point ROS was performed. Pertinent positives listed in HPI. Negative ROS is as follows. Pt denies: angina, palpitations, paresthesias, weakness, vision loss, slurred speech, aphasia, confusion, fever, chills, falls, headache, diplopia, back pain, neck pain, prior episodes of vertigo, hallucinations, new medications or dosage changes. PHYSICAL EXAM:     Visit Vitals  /68   Pulse 73   Temp 97.5 °F (36.4 °C)   Resp 15   Ht 5' 3\" (1.6 m)   Wt 91.6 kg (201 lb 15.1 oz)   SpO2 99%   BMI 35.77 kg/m²          General:   Alert, cooperative, no acute distress. Lungs:   Clear to auscultation bilaterally. No crackles/wheezes. Heart:  Abdominal:  Normal rhythm, no carotid bruits, no peripheral edema  Soft and nondistended   NEUROLOGICAL EXAM:     Mental Status: Oriented to time, place and person. Fully attentive. No aphasia. Full fund of knowledge. Normal recent and remote memory. Cranial Nerves:   Visual Fields:  normal in all quadrants in both eyes. EOM: no nystagmus. Facial movements:  symmetric, no ptosis Facial sensation:  intact to LT on both sides. Hearing:  normal.       Language:  Mild dysarthria, no aphasia, normal fluency, normal repetition. Tongue: midline. Soft palate: not examined  SCMs: normal, symmetric. Reflexes:   LUExt: 2+/ 4                 RUExt: 2+/ 4  LLExt: 2+/4                  RLExt: 2+/ 4          Sensory:   LT and Temp intact in all extremities            Cerebellar:  No resting but has postural tremors, normal finger nose finger.   No pronator drift Motor:           LUExt: 5/ 5               RUExt: 5/ 5                                              LLExt: 5/ 5                RLExt: 5/ 5        Gait:   Not tested        Allergies: Allergies   Allergen Reactions    Aspirin Other (comments)     Chest pain. Has taken ibuprofen ,diclofenac before    Codeine Other (comments)     Chest pain      Phenobarbital Other (comments)     hallucinate       Outpatient Meds  No current facility-administered medications on file prior to encounter. Current Outpatient Medications on File Prior to Encounter   Medication Sig Dispense Refill    levETIRAcetam (KEPPRA) 750 mg tablet Take 750 mg by mouth two (2) times a day. 0900 and 2100      ascorbic acid, vitamin C, (VITAMIN C) 250 mg tablet Take 250 mg by mouth every evening. 1700      nystatin (MYCOSTATIN) topical cream Apply  to affected area daily as needed for Skin Irritation.  cholecalciferol, VITAMIN D3, (VITAMIN D3) 5,000 unit tab tablet Take 5,000 Units by mouth every evening. 1700      carBAMazepine ER (CARBATROL ER) 300 mg capsule Take 300 mg by mouth two (2) times a day. 0900 and 2100      lamoTRIgine (LAMICTAL) 200 mg tablet Take 200 mg by mouth two (2) times a day. 0900 and 2100      clonazePAM (KLONOPIN) 0.5 mg tablet Take 0.5 mg by mouth three (3) times daily.  0900, 1700, 2100         Inpatient Meds    Current Facility-Administered Medications   Medication Dose Route Frequency Provider Last Rate Last Dose    carBAMazepine ER (CARBATROL ER) capsule 300 mg  300 mg Oral BID Harrison Ibrahim MD   300 mg at 08/15/19 6834    lamoTRIgine (LaMICtal) tablet 200 mg  200 mg Oral BID Harrison Ibrahim MD   200 mg at 08/15/19 2062    levETIRAcetam (KEPPRA) tablet 750 mg  750 mg Oral BID Harrison Ibrahim MD   750 mg at 08/15/19 3643    clonazePAM (KlonoPIN) tablet 0.5 mg  0.5 mg Oral BID Harrison Ibrahim MD   0.5 mg at 08/15/19 0925    sodium chloride (NS) flush 5-40 mL  5-40 mL IntraVENous Q8H Ba Melok, DO   10 mL at 08/15/19 9177    sodium chloride (NS) flush 5-40 mL  5-40 mL IntraVENous PRN Ba Melok, DO        acetaminophen (TYLENOL) tablet 650 mg  650 mg Oral Q4H PRN Ba Melok, DO   650 mg at 08/15/19 0932    Or    acetaminophen (TYLENOL) solution 650 mg  650 mg Per NG tube Q4H PRN Ba Melok, DO        Or    acetaminophen (TYLENOL) suppository 650 mg  650 mg Rectal Q4H PRN Ba Melok, DO         Current Outpatient Medications   Medication Sig Dispense Refill    levETIRAcetam (KEPPRA) 750 mg tablet Take 750 mg by mouth two (2) times a day. 0900 and 2100      ascorbic acid, vitamin C, (VITAMIN C) 250 mg tablet Take 250 mg by mouth every evening. 1700      nystatin (MYCOSTATIN) topical cream Apply  to affected area daily as needed for Skin Irritation.  cholecalciferol, VITAMIN D3, (VITAMIN D3) 5,000 unit tab tablet Take 5,000 Units by mouth every evening. 1700      carBAMazepine ER (CARBATROL ER) 300 mg capsule Take 300 mg by mouth two (2) times a day. 0900 and 2100      lamoTRIgine (LAMICTAL) 200 mg tablet Take 200 mg by mouth two (2) times a day. 0900 and 2100      clonazePAM (KLONOPIN) 0.5 mg tablet Take 0.5 mg by mouth three (3) times daily. 0900, 1700, 2100             Past Medical History:   Diagnosis Date    Degenerative disc disease, cervical     C2 C3    Headache     Ill-defined condition     anxiety    Seizures (HCC)     daily petite mal seizure    UTI (urinary tract infection) 05/22/2017       Past Surgical History:   Procedure Laterality Date    HX ORTHOPAEDIC  2014    right knee       family history includes Diabetes in her maternal grandmother; Emphysema in her mother; Hypertension in her father. reports that she has never smoked. She has never used smokeless tobacco. She reports that she does not drink alcohol or use drugs.                Lab Results (last 24 hrs)  Recent Results (from the past 24 hour(s))   GLUCOSE, POC Collection Time: 08/14/19  5:54 PM   Result Value Ref Range    Glucose (POC) 108 (H) 65 - 100 mg/dL    Performed by Jennifer Nieves    CBC WITH AUTOMATED DIFF    Collection Time: 08/14/19  6:02 PM   Result Value Ref Range    WBC 7.6 3.6 - 11.0 K/uL    RBC 4.05 3.80 - 5.20 M/uL    HGB 12.9 11.5 - 16.0 g/dL    HCT 38.0 35.0 - 47.0 %    MCV 93.8 80.0 - 99.0 FL    MCH 31.9 26.0 - 34.0 PG    MCHC 33.9 30.0 - 36.5 g/dL    RDW 12.4 11.5 - 14.5 %    PLATELET 256 280 - 570 K/uL    MPV 9.3 8.9 - 12.9 FL    NRBC 0.0 0  WBC    ABSOLUTE NRBC 0.00 0.00 - 0.01 K/uL    NEUTROPHILS 61 32 - 75 %    LYMPHOCYTES 31 12 - 49 %    MONOCYTES 6 5 - 13 %    EOSINOPHILS 1 0 - 7 %    BASOPHILS 0 0 - 1 %    IMMATURE GRANULOCYTES 1 (H) 0.0 - 0.5 %    ABS. NEUTROPHILS 4.6 1.8 - 8.0 K/UL    ABS. LYMPHOCYTES 2.3 0.8 - 3.5 K/UL    ABS. MONOCYTES 0.5 0.0 - 1.0 K/UL    ABS. EOSINOPHILS 0.1 0.0 - 0.4 K/UL    ABS. BASOPHILS 0.0 0.0 - 0.1 K/UL    ABS. IMM. GRANS. 0.1 (H) 0.00 - 0.04 K/UL    DF AUTOMATED     METABOLIC PANEL, COMPREHENSIVE    Collection Time: 08/14/19  6:02 PM   Result Value Ref Range    Sodium 140 136 - 145 mmol/L    Potassium 4.8 3.5 - 5.1 mmol/L    Chloride 107 97 - 108 mmol/L    CO2 30 21 - 32 mmol/L    Anion gap 3 (L) 5 - 15 mmol/L    Glucose 100 65 - 100 mg/dL    BUN 8 6 - 20 MG/DL    Creatinine 0.90 0.55 - 1.02 MG/DL    BUN/Creatinine ratio 9 (L) 12 - 20      GFR est AA >60 >60 ml/min/1.73m2    GFR est non-AA >60 >60 ml/min/1.73m2    Calcium 9.6 8.5 - 10.1 MG/DL    Bilirubin, total 0.2 0.2 - 1.0 MG/DL    ALT (SGPT) 21 12 - 78 U/L    AST (SGOT) 22 15 - 37 U/L    Alk.  phosphatase 126 (H) 45 - 117 U/L    Protein, total 7.5 6.4 - 8.2 g/dL    Albumin 3.5 3.5 - 5.0 g/dL    Globulin 4.0 2.0 - 4.0 g/dL    A-G Ratio 0.9 (L) 1.1 - 2.2     EKG, 12 LEAD, INITIAL    Collection Time: 08/14/19  6:41 PM   Result Value Ref Range    Ventricular Rate 79 BPM    Atrial Rate 79 BPM    P-R Interval 176 ms    QRS Duration 86 ms    Q-T Interval 360 ms    QTC Calculation (Bezet) 412 ms    Calculated P Axis 20 degrees    Calculated R Axis 48 degrees    Calculated T Axis 61 degrees    Diagnosis       Normal sinus rhythm  Normal ECG  When compared with ECG of 11-JUN-2017 22:40,  Vent. rate has decreased BY  53 BPM     ETHYL ALCOHOL    Collection Time: 08/14/19  8:45 PM   Result Value Ref Range    ALCOHOL(ETHYL),SERUM <10 <10 MG/DL   DRUG SCREEN, URINE    Collection Time: 08/14/19  8:57 PM   Result Value Ref Range    AMPHETAMINES NEGATIVE  NEG      BARBITURATES NEGATIVE  NEG      BENZODIAZEPINES NEGATIVE  NEG      COCAINE NEGATIVE  NEG      METHADONE NEGATIVE  NEG      OPIATES NEGATIVE  NEG      PCP(PHENCYCLIDINE) NEGATIVE  NEG      THC (TH-CANNABINOL) NEGATIVE  NEG      Drug screen comment (NOTE)    URINALYSIS W/MICROSCOPIC    Collection Time: 08/14/19  8:57 PM   Result Value Ref Range    Color YELLOW/STRAW      Appearance CLEAR CLEAR      Specific gravity 1.012 1.003 - 1.030      pH (UA) 7.0 5.0 - 8.0      Protein NEGATIVE  NEG mg/dL    Glucose NEGATIVE  NEG mg/dL    Ketone NEGATIVE  NEG mg/dL    Bilirubin NEGATIVE  NEG      Blood NEGATIVE  NEG      Urobilinogen 1.0 0.2 - 1.0 EU/dL    Nitrites NEGATIVE  NEG      Leukocyte Esterase SMALL (A) NEG      WBC 5-10 0 - 4 /hpf    RBC 0-5 0 - 5 /hpf    Epithelial cells FEW FEW /lpf    Bacteria 1+ (A) NEG /hpf    Hyaline cast 0-2 0 - 5 /lpf   URINE CULTURE HOLD SAMPLE    Collection Time: 08/14/19  8:57 PM   Result Value Ref Range    Urine culture hold        URINE ON HOLD IN MICROBIOLOGY DEPT FOR 3 DAYS. IF UNPRESERVED URINE IS SUBMITTED, IT CANNOT BE USED FOR ADDITIONAL TESTING AFTER 24 HRS, RECOLLECTION WILL BE REQUIRED.    AMMONIA    Collection Time: 08/14/19 11:31 PM   Result Value Ref Range    Ammonia 22 <32 UMOL/L   CARBAMAZEPINE    Collection Time: 08/14/19 11:45 PM   Result Value Ref Range    Carbamazepine 15.4 (H) 4.0 - 12.0 ug/mL   ACETAMINOPHEN    Collection Time: 08/14/19 11:45 PM   Result Value Ref Range Acetaminophen level <2 (L) 10 - 30 ug/mL   SALICYLATE    Collection Time: 08/14/19 11:45 PM   Result Value Ref Range    Salicylate level <4.0 (L) 2.8 - 20.0 MG/DL   LIPID PANEL    Collection Time: 08/15/19  2:23 AM   Result Value Ref Range    LIPID PROFILE          Cholesterol, total 201 (H) <200 MG/DL    Triglyceride 113 <150 MG/DL    HDL Cholesterol 60 MG/DL    LDL, calculated 118.4 (H) 0 - 100 MG/DL    VLDL, calculated 22.6 MG/DL    CHOL/HDL Ratio 3.4 0.0 - 5.0     HEMOGLOBIN A1C WITH EAG    Collection Time: 08/15/19  2:23 AM   Result Value Ref Range    Hemoglobin A1c 5.2 4.2 - 6.3 %    Est. average glucose 103 mg/dL   CBC W/O DIFF    Collection Time: 08/15/19  2:23 AM   Result Value Ref Range    WBC 7.7 3.6 - 11.0 K/uL    RBC 4.00 3.80 - 5.20 M/uL    HGB 12.9 11.5 - 16.0 g/dL    HCT 38.4 35.0 - 47.0 %    MCV 96.0 80.0 - 99.0 FL    MCH 32.3 26.0 - 34.0 PG    MCHC 33.6 30.0 - 36.5 g/dL    RDW 12.4 11.5 - 14.5 %    PLATELET 636 482 - 834 K/uL    MPV 9.3 8.9 - 12.9 FL    NRBC 0.0 0  WBC    ABSOLUTE NRBC 0.00 0.00 - 0.01 K/uL   TSH 3RD GENERATION    Collection Time: 08/15/19  2:23 AM   Result Value Ref Range    TSH 2.21 0.36 - 2.04 uIU/mL   METABOLIC PANEL, BASIC    Collection Time: 08/15/19  2:23 AM   Result Value Ref Range    Sodium 140 136 - 145 mmol/L    Potassium 4.4 3.5 - 5.1 mmol/L    Chloride 107 97 - 108 mmol/L    CO2 29 21 - 32 mmol/L    Anion gap 4 (L) 5 - 15 mmol/L    Glucose 83 65 - 100 mg/dL    BUN 10 6 - 20 MG/DL    Creatinine 0.89 0.55 - 1.02 MG/DL    BUN/Creatinine ratio 11 (L) 12 - 20      GFR est AA >60 >60 ml/min/1.73m2    GFR est non-AA >60 >60 ml/min/1.73m2    Calcium 9.4 8.5 - 10.1 MG/DL   MAGNESIUM    Collection Time: 08/15/19  2:23 AM   Result Value Ref Range    Magnesium 2.3 1.6 - 2.4 mg/dL   PHOSPHORUS    Collection Time: 08/15/19  2:23 AM   Result Value Ref Range    Phosphorus 3.6 2.6 - 4.7 MG/DL   TROPONIN I    Collection Time: 08/15/19  2:23 AM   Result Value Ref Range Troponin-I, Qt. <0.05 <0.05 ng/mL   TROPONIN I    Collection Time: 08/15/19  9:38 AM   Result Value Ref Range    Troponin-I, Qt. <0.05 <0.05 ng/mL   ECHO ADULT COMPLETE    Collection Time: 08/15/19  2:03 PM   Result Value Ref Range    LA Volume 20.4 22 - 52 mL    LV E' Lateral Velocity 11.46 centimeter/second    Tapse 1.98 1.5 - 2.0 cm    Ao Root D 2.67 cm    LA Vol Index 10.51 16 - 28 ml/m2    AO ASC D 2.80 cm    LVIDd 4.27 3.9 - 5.3 cm    LVPWd 0.80 0.6 - 0.9 cm    LVIDs 2.89 cm    IVSd 0.89 0.6 - 0.9 cm    LVOT d 1.72 cm    LVOT Peak Velocity 130.85 cm/s    LVOT Peak Gradient 6.8 mmHg    LVOT VTI 31.24 cm    MVA (PHT) 4.0 cm2    MV A Eric 78.15 cm/s    MV E Eric 97.48 cm/s    MV E/A 1.25     Left Atrium to Aortic Root Ratio 0.98     RVIDd 2.77 cm    LA Vol 4C 23.12 22 - 52 mL    LA Vol 2C 16.59 (A) 22 - 52 mL    LA Area 4C 11.3 cm2    LV Mass .0 67 - 162 g    LV Mass AL Index 64.9 43 - 95 g/m2    Mitral Valve E Wave Deceleration Time 191.2 ms    Mitral Valve Pressure Half-time 55.4 ms    Left Atrium Major Axis 2.62 cm    Triscuspid Valve Regurgitation Peak Gradient 23.3 mmHg    Pulmonic Valve Max Velocity 96.12 cm/s    LVOT SV 72.4 ml    TR Max Velocity 241.15 cm/s    LA Vol Index 8.54 16 - 28 ml/m2    LA Vol Index 11.91 16 - 28 ml/m2    PV peak gradient 3.7 mmHg

## 2019-08-15 NOTE — PROGRESS NOTES
Problem: Self Care Deficits Care Plan (Adult)  Goal: *Acute Goals and Plan of Care (Insert Text)  Description    FUNCTIONAL STATUS PRIOR TO ADMISSION: Patient was independent and active without use of DME.    HOME SUPPORT: The patient lived with grandchildren, children and fiance'  but only required assist after seizures. Occupational Therapy Goals  Initiated 8/15/2019  1. Patient will perform grooming with modified independence within 7 day(s). 2.  Patient will perform upper body dressing with modified independence within 7 day(s). 3.  Patient will perform lower body dressing with modified independence within 7 day(s). 4.  Patient will perform toilet transfers with modified independence within 7 day(s). 5.  Patient will perform all aspects of toileting with modified independence within 7 day(s). 6.  Patient will participate in upper extremity therapeutic exercise/activities with modified independence for 10 minutes within 7 day(s). Outcome: Progressing Towards Goal   OCCUPATIONAL THERAPY EVALUATION  Patient: Jennifer Erickson (89 y.o. female)  Date: 8/15/2019  Primary Diagnosis: Stroke ([address] [I63.9]  Complex partial seizure ([address] [G40.209]  Dysarthria [R47.1]  Stroke ([address] [I63.9]  Complex partial seizure ([address] [G40.209]  Dysarthria [R47.1]        Precautions:   Fall, Seizure    ASSESSMENT  Based on the objective data described below, the patient presents with decreased balance, functional mobility with admission for seizure vs CVA rule out. Patient  CT and MRI negative for acute infarct. Patient complaints of overall feeling of dizziness, but able to participate in OT related tasks today. Patient uses RW with OT for bathroom transfers and required min assist x 1 with increased time. Patient scored 66/66 on the Humza Clunes, indicating no UE deficits. With vision testing patient demonstrates nystagmus with both left and right tracking.   Patient will benefit from continued OT services. Current Level of Function Impacting Discharge (ADLs/self-care): dizziness, decreased balance    Functional Outcome Measure: The patient scored 66/66 on the Barthel Index outcome measure. Other factors to consider for discharge: lives with family but only has assist of grandchildren during day who will return to school shortly. Patient will benefit from skilled therapy intervention to address the above noted impairments. PLAN :  Recommendations and Planned Interventions: self care training, functional mobility training, therapeutic exercise, balance training, therapeutic activities, endurance activities, patient education, home safety training and family training/education    Frequency/Duration: Patient will be followed by occupational therapy 5 times a week to address goals. Recommendation for discharge: (in order for the patient to meet his/her long term goals)  Occupational therapy at least 2 days/week in the home AND ensure assist and/or supervision for safety with ADLs and transfers     This discharge recommendation:  Has not yet been discussed the attending provider and/or case management    Equipment recommendations for successful discharge (if) home: none       SUBJECTIVE:   Patient stated I feel dizzy.     OBJECTIVE DATA SUMMARY:   HISTORY:   Past Medical History:   Diagnosis Date    Degenerative disc disease, cervical     C2 C3    Headache     Ill-defined condition     anxiety    Seizures ([address]     daily petite mal seizure    UTI (urinary tract infection) 05/22/2017     Past Surgical History:   Procedure Laterality Date    HX ORTHOPAEDIC  2014    right knee       Expanded or extensive additional review of patient history:     Home Situation  Home Environment: Private residence  # Steps to Enter: 6  Rails to Enter: Yes  Hand Rails : Right  Wheelchair Ramp: No  One/Two Story Residence: Two story  # of Interior Steps: 12  Living Alone: No  Support Systems: Family member(s)(8 grandchildren (2 assist with care) and 2 of her own childr)  Patient Expects to be Discharged to[de-identified] Private residence  Current DME Used/Available at Home: None  Tub or Shower Type: Shower    Hand dominance: Right    EXAMINATION OF PERFORMANCE DEFICITS:  Cognitive/Behavioral Status:  Neurologic State: Alert  Orientation Level: Oriented X4  Cognition: Appropriate decision making  Perception: Appears intact  Perseveration: No perseveration noted  Safety/Judgement: Awareness of environment    Skin: intact as seen    Edema: none noted     Hearing:       Vision/Perceptual:    Tracking: (noted nystagmus )                                Range of Motion:  AROM: Within functional limits  PROM: Within functional limits                      Strength:  Strength: Generally decreased, functional                Coordination:  Coordination: Generally decreased, functional            Tone & Sensation:  Tone: Normal  Sensation: Intact                      Balance:  Sitting: Intact  Standing: Impaired  Standing - Static: Constant support  Standing - Dynamic : Constant support    Functional Mobility and Transfers for ADLs:  Bed Mobility:  Supine to Sit: Minimum assistance  Sit to Supine: Supervision    Transfers:  Sit to Stand: Minimum assistance  Stand to Sit: Minimum assistance  Bed to Chair: Moderate assistance  Bathroom Mobility: Minimum assistance  Toilet Transfer : Minimum assistance; Additional time;Assist x1(slow movement)  Assistive Device : Walker, rolling    ADL Assessment:  Feeding: Supervision    Oral Facial Hygiene/Grooming: Minimum assistance(standing briefly at sink)    Bathing: Minimum assistance(balance in standing, mora area)    Upper Body Dressing: Supervision    Lower Body Dressing: Minimum assistance(balance in standing)    Toileting: Minimum assistance                ADL Intervention and task modifications:                                     Cognitive Retraining  Safety/Judgement: Awareness of environment    Therapeutic Exercise:    Functional Measure:  Fugl-Newsome Assessment of Motor Recovery after Stroke:     Reflex Activity  Flexors/Biceps/Fingers: Can be elicited  Extensors/Triceps: Can be elicited  Reflex Subtotal: 4    Volitional Movement Within Synergies  Shoulder Retraction: Full  Shoulder Elevation: Full  Shoulder Abduction (90 degrees): Full  Shoulder External Rotation: Full  Elbow Flexion: Full  Forearm Supination: Full  Shoulder Adduction/Internal Rotation: Full  Elbow Extension: Full  Forearm Pronation: Full  Subtotal: 18    Volitional Movement Mixing Synergies  Hand to Lumbar Spine: Full  Shoulder Flexion (0-90 degrees): Full  Pronation-Supination: Full  Subtotal: 6    Volitional Movement With Little or No Synergy  Shoulder Abduction (0-90 degrees): Full  Shoulder Flexion ( degrees): Full  Pronation/Supination: Full  Subtotal : 6    Normal Reflex Activity  Biceps, Triceps, Finger Flexors: Full  Subtotal : 2    Upper Extremity Total   Upper Extremity Total: 36    Wrist  Stability at 15 Degree Dorsiflexion: Full  Repeated Dorsiflexion/ Volar Flexion: Full  Stability at 15 Degree Dorsiflexion: Full  Repeated Dorsiflexion/ Volar Flexion: Full  Circumduction: Full  Wrist Total: 10    Hand  Mass Flexion: Full  Mass Extension: Full  Grasp A: Full  Grasp B: Full  Grasp C: Full  Grasp D: Full  Grasp E: Full  Hand Total: 14    Coordination/Speed  Tremor: None  Dysmetria: None  Time: <1s  Coordination/Speed Total : 6    Total A-D  Total A-D (Motor Function): 66/66         This is a reliable/valid measure of arm function after a neurological event. It has established value to characterize functional status and for measuring spontaneous and therapy-induced recovery; tests proximal and distal motor functions. Fugl-Newsome Assessment - UE scores recorded between five and 30 days post neurologic event can be used to predict UE recovery at six months post neurologic event.   Severe = 0-21 points Moderately Severe = 22-33 points   Moderate = 34-47 points   Mild = 48-66 points  YU Schrader, WILD Dhaliwal, & PATRICIA Agee (1992). Measurement of motor recovery after stroke: Outcome assessment and sample size requirements. Stroke, 23, pp. 1156-8136.   --------------------------------------------------------------------------------------------------------------------------------------------------------------------  MCID:  Stroke:   Grover Lefort et al, 2001; n = 171; mean age 79 (6) years; assessed within 16 (12) days of stroke, Acute Stroke)  FMA Motor Scores from Admission to Discharge   10 point increase in FMA Upper Extremity = 1.5 change in discharge FIM   10 point increase in FMA Lower Extremity = 1.9 change in discharge FIM  MDC:   Stroke:   Manuela Thompson et al, 2008, n = 14, mean age = 59.9 (14.6) years, assessed on average 14 (6.5) months post stroke, Chronic Stroke)   FMA = 5.2 points for the Upper Extremity portion of the assessment         Occupational Therapy Evaluation Charge Determination   History Examination Decision-Making   LOW Complexity : Brief history review  LOW Complexity : 1-3 performance deficits relating to physical, cognitive , or psychosocial skils that result in activity limitations and / or participation restrictions  LOW Complexity : No comorbidities that affect functional and no verbal or physical assistance needed to complete eval tasks       Based on the above components, the patient evaluation is determined to be of the following complexity level: LOW   Pain Rating:      Activity Tolerance: WNL  Please refer to the flowsheet for vital signs taken during this treatment. After treatment patient left in no apparent distress:    Supine in bed and Side rails x 3    COMMUNICATION/EDUCATION:   The patients plan of care was discussed with: Physical Therapist and Registered Nurse.     Home safety education was provided and the patient/caregiver indicated understanding., Patient/family have participated as able in goal setting and plan of care. and Patient/family agree to work toward stated goals and plan of care. This patients plan of care is appropriate for delegation to ANTELMO.     Thank you for this referral.  Dale Mclain, OTR/L  Time Calculation: 26 mins

## 2019-08-15 NOTE — DISCHARGE SUMMARY
[address] [place] [address] [address] [place] [address]   Office [phone]  Fax [phone]       Discharge / Transfer / Off-Service Note     Name: Ruth Kim MRN: [id number]  Sex: Female   YOB: 1965  Age: 47 y.o. PCP: Angelita Pyle NP     Date of admission: 8/14/2019  Date of discharge/transfer: 8/16/2019    Attending physician at admission: Rob Payne DO  Attending physician at discharge/transfer: Toñito Pang MD  Resident physician at discharge/transfer: Antonino Osborn DO     Consultants during hospitalization  IP CONSULT TO [address]     Admission diagnoses   Stroke [facility]) [I63.9]  Complex partial seizure ([address] [G40.209]  Dysarthria [R47.1]  Stroke ([address] [I63.9]  Complex partial seizure ([address] [G40.209]  Dysarthria [R47.1]    Recommended follow-up after discharge    1. PCP     Things to follow up on with PCP:   Blurry vision, dizziness     Medication Changes:  1. NEW MEDICATIONS: Take one 300mg Carbamazepine daily and one 200mg qhs (changed from Carbamazepine 300mg bid)      2. 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 Esthela Lott- last follow up was 4 months ago), and cervical spinal stenosis, who presents to the ER complaining of 5 day h/o slurred speech, ataxia, weakness, and blurry vision. Pt denies fall, syncope or LOC. States \"I feel like I am talking like I am drunk. \" She is prescribed Lamictal 200 BID, Keppra 759 BID and Tegretol 300 BID for her seizure history. Notes that her neurologist switched her from Lamictal chewable tablets to oral tablets about a week ago. Dose did not change.   Pt denies any fever, chills, chest pain, SOB, abdominal pain, n/v/d, dysuria, numbness or tingling in arms or legs.          In the ER,  - vital signs were remarkable for  HTN  -Labs were wnl   -CT Head w/o contrast showed no acute abnormality, no intracranial hemorrhage. -CXR showed no acute processes. -EKG NSR with rate of 79   - Pt was not treated with TPA and FMS was consulted. \"      Hospital course    Jhonatan Sandoval was admitted into the Family Medicine Service from 8/14/2019 to 8/16/19 for Stroke [facility]) [I63.9]  Complex partial seizure ([address] [P94.102]  Dysarthria [R47.1]  Stroke [address] [I63.9]  Complex partial seizure ([address] [G40.209]  Dysarthria [R47.1]. During the course of this admission, the following conditions were addressed/managed;      CVA/TIA: Came in for 5 days blurry vision, dizziness, weakness, NIHSS 1. Neurologic workup for acute CVA neg.  - Referral to outpatient physical therapy     Complex partial seizures:  STABLE (followed by [facility] Dr. Uziel Sauceda) baseline is 2-3 seizures per day. Do not have [facility] records at this time. EEG wnl.  - Continue home Lamictal and Keppra.    - Take one 300mg Carbamazepine daily and one 200mg qhs     Asterixis vs tremor: Amonia level wnl (22), likely seizure medication side effect  - Follow up with Dr. Uziel Sauceda     Obesity BMI Body mass index is 35.78 kg/m². - Encouraging lifestyle modifications and further follow up outpatient. Physical exam at discharge:    Vitals Reviewed. Patient Vitals for the past 12 hrs:   Temp Pulse Resp BP SpO2   08/16/19 1158 97.6 °F (36.4 °C) 77 18 120/73 95 %   08/16/19 0746 97.7 °F (36.5 °C) 67 18 122/66 96 %   08/16/19 0701  69      08/16/19 0400 97.5 °F (36.4 °C) 78 16 119/71 94 %      General Oriented to person, place, and time and well-developed. Appears well-nourished, no distress. Not diaphoretic. HENT Head Normocephalic and atraumatic. Eyes Conjunctivae are normal, no discharge. No scleral icterus. Neck No thyromegaly present. No cervical adenopathy. Cardio Normal rate, regular rhythm. Exam reveals no gallop and no friction rub. No murmur heard. No chest wall tenderness.     Pulmonary Effort normal and breath sounds normal. No respiratory distress. No wheezes, no rales. Abdominal Soft. Bowel sounds normal. No distension. No tenderness.  Deferred. Extremities No edema of lower extremities. No tenderness. Distal pulses intact. Neurological Alert and oriented to person, place, and time. Cranial nerves II-XII in tact. No focal neuro defecits   Dermatology Skin is warm and dry. No rash noted. No erythema or pallor. Psychiatric Affect and judgment normal.        Condition at discharge: Stable. Labs  Recent Labs     08/16/19  0100 08/15/19  0223 08/14/19  1802   WBC 7.7 7.7 7.6   HGB 12.5 12.9 12.9   HCT 38.8 38.4 38.0    156 213     Recent Labs     08/16/19  0100 08/15/19  0223 08/14/19  1802    140 140   K 3.7 4.4 4.8    107 107   CO2 28 29 30   BUN 12 10 8   CREA 0.88 0.89 0.90   GLU 81 83 100   CA 9.7 9.4 9.6   MG 2.2 2.3  --    PHOS 4.1 3.6  --      Recent Labs     08/14/19  1802   SGOT 22   ALT 21   *   TBILI 0.2   TP 7.5   ALB 3.5   GLOB 4.0     Recent Labs     08/15/19  1732 08/15/19  0938 08/15/19  0223 08/14/19  1754   TROIQ <0.05 <0.05 <0.05  --    GLUCPOC  --   --   --  108*     Cultures  · Urine cx pending    Procedures / Diagnostic Studies  · CT head: no acute findings  · CXR: no acute process  · MRI brain: no acute abnormality   · CTA head/neck: no aneurysm or stenosis identified. Common carotid bifurcations are wnl. Vertebral arteries are patent. · EEG wnl    Imaging  Results from [place] encounter on 08/14/19   XR CHEST PA LAT    Narrative EXAM:  XR CHEST PA LAT    INDICATION:   dizzy    COMPARISON: 2017. FINDINGS: PA and lateral radiographs of the chest demonstrate clear lungs. The  cardiac and mediastinal contours and pulmonary vascularity are normal.  Bony  structures are intact. 2 fusion changes are noted cervical spine.  Report delayed  by slowness of PACS and batching of films      Impression IMPRESSION: No acute process identified                      Results from [place] encounter on 08/14/19   CTA HEAD NECK W CONT    Narrative EXAM: CTA HEAD NECK W CONT    INDICATION: CVA workup    COMPARISON: None. CONTRAST: 100 mL of .    TECHNIQUE:  Unenhanced  images were obtained to localize the volume for  acquisition. Multislice helical axial CT angiography was performed from the  aortic arch to the top of the head during uneventful rapid bolus intravenous  contrast administration. Coronal and sagittal reformations and 3D post  processing was performed. CT dose reduction was achieved through use of a  standardized protocol tailored for this examination and automatic exposure  control for dose modulation. FINDINGS:    CTA NECK  There is conventional three vessel arch anatomy. The bilateral subclavian,  common carotid, and internal carotid arteries are patent with no flow-limiting  stenosis. % of right carotid artery stenosis: No stenosis  % of left carotid artery stenosis: No stenosis    NASCET method was utilized for calculating stenosis. The vertebral arteries are codominant and patent. The cervical soft tissues are  unremarkable. Anterior fusion changes are noted C5, C6-C7 levels. There is mild  prominence of the palatine tonsils. CTA HEAD  The vertebral arteries are codominant. The basilar artery and its branches are  normal. The internal carotid, anterior cerebral, and middle cerebral arteries  are patent. There is no flow-limiting intracranial stenosis. There is no  aneurysm. There are bilateral posterior communicating arteries. .      Impression IMPRESSION: No aneurysm or stenosis identified. . Common carotid bifurcations are  within normal limits. Vertebral arteries are patent. No procedure found.     Chronic diagnoses   Problem List as of 8/16/2019 Date Reviewed: 6/12/2017          Codes Class Noted - Resolved    Stroke [facility]) ICD-10-CM: I63.9  ICD-9-CM: 434.91  8/14/2019 - Present * (Principal) Dysarthria ICD-10-CM: R47.1  ICD-9-CM: 784.51  8/14/2019 - Present        Complex partial seizure ([facility] ICD-10-CM: G40.209  ICD-9-CM: 345.40  8/14/2019 - Present        SIRS (systemic inflammatory response syndrome) (HCC) ICD-10-CM: R65.10  ICD-9-CM: 995.90  6/12/2017 - Present        Cervical stenosis of spine ICD-10-CM: M48.02  ICD-9-CM: 723.0  6/5/2017 - Present        Convulsion ([facility] ICD-10-CM: R56.9  ICD-9-CM: 780.39  6/1/2015 - Present              Discharge/Transfer Medications  Current Discharge Medication List      CONTINUE these medications which have CHANGED    Details   !! carBAMazepine ER (CARBATROL ER) 300 mg capsule Take 1 Cap by mouth daily. Qty: 30 Cap, Refills: 0      !! carBAMazepine ER (CARBATROL ER) 200 mg capsule Take 1 Cap by mouth every evening. Qty: 30 Cap, Refills: 0       !! - Potential duplicate medications found. Please discuss with provider. CONTINUE these medications which have NOT CHANGED    Details   levETIRAcetam (KEPPRA) 750 mg tablet Take 750 mg by mouth two (2) times a day. 0900 and 2100      ascorbic acid, vitamin C, (VITAMIN C) 250 mg tablet Take 250 mg by mouth every evening. 1700      nystatin (MYCOSTATIN) topical cream Apply  to affected area daily as needed for Skin Irritation. cholecalciferol, VITAMIN D3, (VITAMIN D3) 5,000 unit tab tablet Take 5,000 Units by mouth every evening. 1700      lamoTRIgine (LAMICTAL) 200 mg tablet Take 200 mg by mouth two (2) times a day. 0900 and 2100      clonazePAM (KLONOPIN) 0.5 mg tablet Take 0.5 mg by mouth three (3) times daily. 0900, 1700, 2100              Diet:  Regular diet.     Activity:  As tolerated     Disposition: Home with outpatient physical therapy referral     Discharge instructions to patient/family  Please seek medical attention for any new or worsening symptoms particularly fever, chest pain, shortness of breath, abdominal pain, nausea, vomiting    Follow up plans/appointments  Follow-up Information     Follow up With Specialties Details Why Contact Info    Angelita Pyle NP Family Practice On 8/20/2019 Please attend your follow up on 8/20 at 3:45pm with Dr. Madonna Foss [address] [place] [id number]  [phone]      Roxann Cardenas MD Neurology In 2 weeks [facility] [address] [facility],   Family Medicine Resident       For Billing    Chief Complaint   Patient presents with   Critical access hospital Problems  Date Reviewed: 6/12/2017          Codes Class Noted POA    Stroke [facility]) ICD-10-CM: I63.9  ICD-9-CM: 434.91  8/14/2019 Unknown        * (Principal) Dysarthria ICD-10-CM: R47.1  ICD-9-CM: 784.51  8/14/2019 Unknown        Complex partial seizure ([facility] ICD-10-CM: J93.478  ICD-9-CM: 345.40  8/14/2019 Unknown

## 2019-08-15 NOTE — PROGRESS NOTES
[address] [facility] [address] [facility]    Office [phone]  Fax [phone]          Assessment and Jose Dear is a 47 y.o. female who is admitted for CVA and dysarthria with h/o complex partial seizures (followed by [facility]). She has spent 1 night in the hospital    24 Hour Events: no overnight events per nursing. Pt ate some amarilis crackers around 2AM.  Requesting breakfast.      1. CVA/TIA: NIHSS 1  -CT Head w/o contrast showed no acute abnormality, no intracranial hemorrhage. -CXR showed no acute processes. -EKG NSR with rate of 79  -Echo results pending   -CTA head and neck - no aneurysm or stenosis  - Brain MRI - no acute infarct or hemorrhage   -CBC, BMP, phosphorus, Mag, TSH- all  wnl   -Lipid (LDL goal <70)   -Troponin q6hrs   - UDS & EtOH negative  -continuous neuro checks q4h   -Neurology consult ordered and all recommendations appreciated. -PT/OT/Speech and CW consult         2. Complex partial seizures:  STABLE (followed by [facility] Dr. Memo Andrews) baseline is 2-3 seizures per day. Do not have [facility] records at this time.    -restart home Carbamazepine, Lamictal, and Keppra. -Neurology consult ordered and all recommendations appreciated.       3. Asterixis vs tremor  -amonia level wnl (22)  -? seizure medication side effect     Obesity BMI Body mass index is 35.78 kg/m². - Encouraging lifestyle modifications and further follow up outpatient.         FEN/GI - Regular diet. Activity - Ambulate with assistance  DVT prophylaxis - SCDs  GI prophylaxis - Not indicated at this time  Fall prophylaxis - Not indicated at this time. Disposition - Admit to Telemetry. Plan to d/c to Home. Code Status - Full, discussed with patient / caregivers.   Next of Sabine 69 Name and Contact Daughter      Patient Randal Perez will be discussed Dr. Christ Kaur.     I appreciate the opportunity to participate in the care of this patient,  Lucy Castro,   Family Medicine Resident         Subjective / Objective     Subjective Pt states she rested well overnight. C/o being hungry this AM.  Has not gotten up to ambulate this morning so unsure if her balance is still impaired. States the blurry vision is still present but has not worsened. Denies dizziness, HA, chest pain, SOB, n/v, abdominal pain, pain in her legs or edema. Temp (24hrs), Av.9 °F (36.6 °C), Min:97.7 °F (36.5 °C), Max:98 °F (36.7 °C)     Objective  Respiratory:   O2 Device: Room air   General:   Alert, cooperative, no acute distress   Head:   Atraumatic   Eyes:   Conjunctivae clear, PERRL    ENT:  Oral mucosa normal   Neck:  Supple, trachea midline, no adenopathy   No JVD         Lungs:   Clear to auscultation bilaterally    Heart:   Regular rhythm, no murmur   Abdomen:    Soft, non-tender   No masses or organomegaly   Bowel sounds present    Extremities:  No edema or DVT signs. Positive flapping motion of bilateral hands (astericsis vs tremor?)    Skin:  Warm and dry    No rashes or lesions   Neurologic:  Oriented x3  Speech appears to still be more labile (according to daughter at admission this is not patients baseline)    No focal deficits  CN 2-12 intact.    Strength 5/5 BUE and BLE   No pronator drift        Inpatient Medications  Current Facility-Administered Medications   Medication Dose Route Frequency    carBAMazepine ER (CARBATROL ER) capsule 300 mg  300 mg Oral BID    lamoTRIgine (LaMICtal) tablet 200 mg  200 mg Oral BID    levETIRAcetam (KEPPRA) tablet 750 mg  750 mg Oral BID    clonazePAM (KlonoPIN) tablet 0.5 mg  0.5 mg Oral BID    sodium chloride (NS) flush 5-40 mL  5-40 mL IntraVENous Q8H    sodium chloride (NS) flush 5-40 mL  5-40 mL IntraVENous PRN    acetaminophen (TYLENOL) tablet 650 mg  650 mg Oral Q4H PRN    Or    acetaminophen (TYLENOL) solution 650 mg  650 mg Per NG tube Q4H PRN    Or    acetaminophen (TYLENOL) suppository 650 mg  650 mg Rectal Q4H PRN     Current Outpatient Medications   Medication Sig    levETIRAcetam (KEPPRA) 750 mg tablet Take 750 mg by mouth two (2) times a day. 0900 and 2100    ascorbic acid, vitamin C, (VITAMIN C) 250 mg tablet Take 250 mg by mouth every evening. 1700    nystatin (MYCOSTATIN) topical cream Apply  to affected area daily as needed for Skin Irritation.  cholecalciferol, VITAMIN D3, (VITAMIN D3) 5,000 unit tab tablet Take 5,000 Units by mouth every evening. 1700    carBAMazepine ER (CARBATROL ER) 300 mg capsule Take 300 mg by mouth two (2) times a day. 0900 and 2100    lamoTRIgine (LAMICTAL) 200 mg tablet Take 200 mg by mouth two (2) times a day. 0900 and 2100    clonazePAM (KLONOPIN) 0.5 mg tablet Take 0.5 mg by mouth three (3) times daily. 0900, 1700, 2100         Allergies  Allergies   Allergen Reactions    Aspirin Other (comments)     Chest pain.  Has taken ibuprofen ,diclofenac before    Codeine Other (comments)     Chest pain      Phenobarbital Other (comments)     hallucinate         CBC:  Recent Labs     08/15/19  0223 08/14/19  1802   WBC 7.7 7.6   HGB 12.9 12.9   HCT 38.4 38.0    324       Metabolic Panel:  Recent Labs     08/15/19  0223 08/14/19  1802    140   K 4.4 4.8    107   CO2 29 30   BUN 10 8   CREA 0.89 0.90   GLU 83 100   CA 9.4 9.6   MG 2.3  --    PHOS 3.6  --    ALB  --  3.5   SGOT  --  22   ALT  --  21       EKG 8/14: NSR rate 79    Imaging:   CT Head w/o contrast showed no acute abnormality, no intracranial hemorrhage  CTA head and neck - no aneurysm or stenosis   Brain MRI - no acute infarct or hemorrhage          For Billing    Chief Complaint   Patient presents with   [facility] Problems  Date Reviewed: 6/12/2017          Codes Class Noted POA    Stroke [facility]) ICD-10-CM: I63.9  ICD-9-CM: 434.91  8/14/2019 Unknown        Dysarthria ICD-10-CM: R47.1  ICD-9-CM: 784.51  8/14/2019 Unknown        Complex partial seizure ([facility] [address] ICD-10-CM: N31.343  ICD-9-CM: 345.40  8/14/2019 Unknown

## 2019-08-15 NOTE — PROCEDURES
Name:   Gunnar Manriquez  YOB: 1965  MRN:   [id number]           Procedure:   EEG     Location:   [place]  Date of Recordin/15/19      Date of Interpretation:    08/15/19    Interpreting physician: Gregorio Rivas MD  Requesting provider: Jessenia Irvin DO      Indication: 47 y.o. female with PMHx epilepsy (followed at [facility] x 20+ years). Recent change in formulation (not dosing) of her lamotrigine tablets. Complaining of feeling slurred speech, weakness, blurry vision x 5 days. Home AEDs are Lamictal 200 mg BID, Keppra 750 BID, Tegretol 300 mg BID. Technical:   Digital EEG recorded in 10-20 international placement system, multiple montages    Interpretation:   Patient level of alertness: awake  Background pattern: symmetric. Posterior dominant rhythm: 8-9 Hz, symmetric. Photic stimulation: no clear driving response on either side. HV: not performed. Drowsiness recorded: yes, normal.  Sleep recorded: no.  Single lead EKG: no abnormalities. Areas of focal slowing: none. Epileptiform discharges: none. Electrographic seizures: none. Impression: This is a normal awake and drowsy EEG recording. Clinical correlation is necessary.         Gregorio Rivas MD  [facility]

## 2019-08-15 NOTE — PROGRESS NOTES
[address]   Senior Resident Admission Note    CC: slurred speech     HPI:  Willard Granados is a 47 y.o. female w/ PMHx of known h/o complex partial seziure (followed by [facility] Dr. Alex Gonzalez), and cervical spinal stenosis, who presents to the ER complaining of slurred speech, vision changes, and balance issues for the past 5 days. States she takes Tegretol 300mg BID, Lamictal 200mg BID(recent changed from chewable to tabs) , Keppra 750mg BID. Daughter thinks she is talking like she is drunk and also thinks her eyes have been a little yellow. Pt reports that she has 2-3 seizures a day. No Falls. 8/14/2019    Vital Signs  Blood pressure 154/74, pulse 84, temperature 97.7 °F (36.5 °C), resp. rate 14, height 5' 3\" (1.6 m), weight 202 lb (91.6 kg), SpO2 98 %. Physical Exam   Constitutional: She is oriented to person, place, and time and well-developed, well-nourished, and in no distress. HENT:   Head: Normocephalic and atraumatic. Cardiovascular: Normal rate and regular rhythm. Pulmonary/Chest: Effort normal and breath sounds normal.   Neurological: She is alert and oriented to person, place, and time. No cranial nerve deficit. She exhibits normal muscle tone. Coordination normal.   Daughter noting abnormal speech, no slurring of words present      Recent Labs     08/14/19  1802   WBC 7.6   HGB 12.9   HCT 38.0         K 4.8      CO2 30      BUN 8   CREA 0.90   CA 9.6   ALB 3.5   TBILI 0.2   SGOT 22   ALT 21     Imaging  Xr Chest Pa Lat    Result Date: 8/14/2019  IMPRESSION: No acute process identified     Ct Head Wo Cont    Result Date: 8/14/2019  IMPRESSION: No acute abnormality identified       A/P: Pt is a 47 y.o. female who presented to ED with slurred speech, vision changes, and balance issues for the past 5 days concerning for CVA/TIA     CVA/TIA: No a candidate for tPA. CT head negative for acute process  -Neurology consulted and appreciate the assistance and recommendations. -MRI/MRA, Carotid Dopplers, TTE, Cardiac Enzymes x 3   -LDL goal <70 but pt refusing statin would benefit from therapy   - hold asa given hx of intolerance to antiplatelets and Noac?   -Permissive HTN for the first 24-48 hours SBP<220 and DBP<110    -Consulted PT/OT for rehab eval, speech, CM for disposition planning  - Neuro check q4h   - CMP, Mg, Phos, TSH    Complex partial seizures:   - follow up neuro recs   - continue  Lamictal 200 BID, Keppra 759 BID and Tegretol 300 BID,   - decrease Klonopin to .5mg BID from TID     Asterixis vs tremor- possible SE of seizure medications, LFTs ok   -f/u ammonia level     Patient seen, examined, and discussed with Dr. Stephen Allen (PGY-1). For the remaining assessment and plan of other medical problems please refer to Dr. Vielka Georges s H&P for more details.     Pt discussed with Dr. Tee Patterson (on-call attending physician)    Yogi Rodriguez MD  Family Medicine Resident

## 2019-08-15 NOTE — PROGRESS NOTES
[facility] FAMILY MEDICINE RESIDENCY PROGRAM   Daily Progress Note    Date: 8/16/2019  PCP: Roger Roper NP     Assessment/Plan:     Kalia Vega is a 47 y.o. female with history of complex partial seizures and cervical spinal stenosis who has spent 1 night(s) in the hospital, who is admitted for CVA/TIA rule out    24 hour event: Had one staring episode this AM before I came in that lasted 4 minutes. It was similar to ones she has had in the past. After this episode she reports confusion and a headache and requested Tylenol. CVA/TIA: Came in with 5 days blurry vision, dizziness, weakness, dysarthria, ataxia. NIHSS 1 on admission.  - Neurologic workup for acute CVA neg.   - UDS, ETOH neg    Complex partial seizures:  STABLE (followed by [facility] Dr. Teresita Villanueva, next appointment 9/2019) baseline is 2-3 seizures per day. Do not have [facility] records at this time. EEG wnl.  - Continue home Carbamazepine, Lamictal, and Keppra.       Asterixis vs tremor: Amonia level wnl (22), likely seizure medication side effect  - Carbamazepine level elevated at 15.4  - Lamotrigine and levetiracetam levels pending  - Follow up with Dr. Teresita Villanueva     Obesity BMI Body mass index is 35.78 kg/m². - Encouraging lifestyle modifications and further follow up outpatient. FEN/GI:Regular diet  Activity:Ambulate with assistance  DVT prophylaxis:SCDs   GI prophylaxis:Not indicated at this time  Disposition:TBD     CODE STATUS: FULL    Pt discussed with Dr. Sharman Scheuermann, DO   Family Medicine Resident          Subjective  Patient was sleepy upon entering room. Reported staring spell this AM lasting 4 minutes, followed by some confusion and a HA. Blurry vision and dizziness are still present, unchanged from yesterday. Denies fever, chills, chest pain, palpitations, shortness of breath, abdominal pain, nausea and vomiting, and LE edema.      Inpatient Medications  Current Facility-Administered Medications Medication Dose Route Frequency    lamoTRIgine (LaMICtal) tablet 200 mg  200 mg Oral BID    levETIRAcetam (KEPPRA) tablet 750 mg  750 mg Oral BID    clonazePAM (KlonoPIN) tablet 0.5 mg  0.5 mg Oral BID    carBAMazepine ER (CARBATROL ER) capsule 300 mg  300 mg Oral DAILY    carBAMazepine ER (CARBATROL ER) capsule 200 mg  200 mg Oral QPM    sodium chloride (NS) flush 5-40 mL  5-40 mL IntraVENous Q8H    sodium chloride (NS) flush 5-40 mL  5-40 mL IntraVENous PRN    acetaminophen (TYLENOL) tablet 650 mg  650 mg Oral Q4H PRN    Or    acetaminophen (TYLENOL) solution 650 mg  650 mg Per NG tube Q4H PRN    Or    acetaminophen (TYLENOL) suppository 650 mg  650 mg Rectal Q4H PRN         Allergies  Allergies   Allergen Reactions    Aspirin Other (comments)     Chest pain. Has taken ibuprofen ,diclofenac before    Codeine Other (comments)     Chest pain      Phenobarbital Other (comments)     hallucinate         Objective  Vitals:  Visit Vitals  /71 (BP 1 Location: Left arm, BP Patient Position: At rest)   Pulse 78   Temp 97.5 °F (36.4 °C)   Resp 16   Ht 5' 3\" (1.6 m)   Wt 201 lb 15.1 oz (91.6 kg)   SpO2 94%   BMI 35.77 kg/m²      Temp (24hrs), Av.6 °F (36.4 °C), Min:97.5 °F (36.4 °C), Max:97.7 °F (36.5 °C)         O2 Device: Room air    I/O:  No intake or output data in the 24 hours ending 19 0656  Last 3 shifts:    No intake/output data recorded. Physical Exam:  Physical Exam   Constitutional: She is oriented to person, place, and time and well-developed, well-nourished, and in no distress. No distress. Eyes: Conjunctivae are normal. No scleral icterus. Cardiovascular: Normal rate, regular rhythm, normal heart sounds and intact distal pulses. Exam reveals no gallop and no friction rub. No murmur heard. Pulmonary/Chest: Effort normal and breath sounds normal. No respiratory distress. She has no wheezes. She has no rales. She exhibits no tenderness. Abdominal: Soft.  Bowel sounds are normal. She exhibits no distension. There is no tenderness. There is no rebound and no guarding. Neurological: She is alert and oriented to person, place, and time. She displays normal reflexes. No cranial nerve deficit. Coordination normal.   Skin: Skin is warm and dry. Psychiatric: Affect and judgment normal.          Labs and Data:      Recent Labs     08/16/19  0100 08/15/19  0223 08/14/19  1802   WBC 7.7 7.7 7.6   HGB 12.5 12.9 12.9   HCT 38.8 38.4 38.0    156 213     Recent Labs     08/16/19  0100 08/15/19  0223 08/14/19  1802    140 140   K 3.7 4.4 4.8    107 107   CO2 28 29 30   GLU 81 83 100   BUN 12 10 8   CREA 0.88 0.89 0.90   CA 9.7 9.4 9.6   MG 2.2 2.3  --    PHOS 4.1 3.6  --    ALB  --   --  3.5   SGOT  --   --  22   ALT  --   --  21       Imaging/procedures: CT head- no acute abnormality, CXR- no acute process, MRI brain- no acute abnormality, CTA head/neck- no aneurysm or stenosis. Common carotid bifurcations wnl, vertebral arteries are patent.       Signed by: Torey Macias DO  Resident, Family Medicine  08/16/19       Attending Note:

## 2019-08-15 NOTE — PROGRESS NOTES
Bedside and Verbal shift change report given to AGUSITN Webb (oncoming nurse) by Sangeetha Bullard RN (offgoing nurse). Report included the following information SBAR, Kardex, ED Summary and Recent Results.

## 2019-08-15 NOTE — PROGRESS NOTES
Speech Pathology bedside swallow evaluation/discharge  Patient: Marylou Castaneda (17 y.o. female)  Date: 8/15/2019  Primary Diagnosis: Stroke [facility]) [I63.9]  Complex partial seizure ([address] [G40.209]  Dysarthria [R47.1]       Precautions:        ASSESSMENT :  Based on the objective data described below, the patient presents with functional swallow. Her speech is mildly slurred at times. Speech tasks revealed more spirantization of velars and conversation produced sibilant issues. Generalized mumbling noted at times. Patient c/o vision and fine motor control issues as well. Admitted with dysarthria, blurred vision, balance problems for 4 days. MRI: negative. PMH: sz, spinal stenosis. Skilled acute therapy provided by a speech-language pathologist is not indicated at this time. PLAN :  Recommendations:  Ok for [employer], thin liquids. No SLP needed at this time, as speech issues are transient and appeared to be resolving  Discharge Recommendations: None     SUBJECTIVE:   Patient stated I just feel like I\"m stuttering and my speech is slurred. .    OBJECTIVE:     Past Medical History:   Diagnosis Date    Degenerative disc disease, cervical     C2 C3    Headache     Ill-defined condition     anxiety    Seizures (HCC)     daily petite mal seizure    UTI (urinary tract infection) 05/22/2017     Past Surgical History:   Procedure Laterality Date    HX ORTHOPAEDIC  2014    right knee     Prior Level of Function/Home Situation:      Diet prior to admission: regular, thins  Current Diet:  Regualr, thins.  She passed the STAND   Cognitive and Communication Status:  Neurologic State: Alert  Orientation Level: Oriented X4  Cognition: Appropriate decision making  Perception: Appears intact  Perseveration: No perseveration noted  Safety/Judgement: Awareness of environment  Oral Assessment:  Oral Assessment  Labial: No impairment  Dentition: Limited;Natural  Oral Hygiene: WFL  Lingual: No impairment  Velum: No impairment  Mandible: No impairment  P.O. Trials:  Patient Position: upright in bed  Vocal quality prior to P.O.: No impairment  Consistency Presented: Solid; Thin liquid  How Presented: Self-fed/presented;Spoon;Straw   ORAL PHASE:   Bolus Acceptance: No impairment  Bolus Formation/Control: No impairment     Propulsion: No impairment  Oral Residue: None   PHARYNGEAL PHASE:   Initiation of Swallow: No impairment  Laryngeal Elevation: Functional  Aspiration Signs/Symptoms: None  Pharyngeal Phase Characteristics: No impairment, issues, or problems          SPEECH:     DDK: wfl with mild spirantization of velars only. Conversational speech intelligibility was 85%. Noted more spirtantization with sibilants, specifically S  She appeared to have intermittently reduced effort with speech. .       NOMS:   The NOMS functional outcome measure was used to quantify this patient's level of swallowing impairment. Based on the NOMS, the patient was determined to be at level 7 for swallow function     NOMS Swallowing Levels:  Level 1 (CN): NPO  Level 2 (CM): NPO but takes consistency in therapy  Level 3 (CL): Takes less than 50% of nutrition p.o. and continues with nonoral feedings; and/or safe with mod cues; and/or max diet restriction  Level 4 (CK): Safe swallow but needs mod cues; and/or mod diet restriction; and/or still requires some nonoral feeding/supplements  Level 5 (CJ): Safe swallow with min diet restriction; and/or needs min cues  Level 6 (CI): Independent with p.o.; rare cues; usually self cues; may need to avoid some foods or needs extra time  Level 7 ([facility]): Independent for all p.o.  RAUL. (2003). National Outcomes Measurement System (NOMS): Adult Speech-Language Pathology User's Guide.        Pain:  Pain Scale 1: Numeric (0 - 10)  Pain Intensity 1: 5  Pain Location 1: Head  After treatment:   [] Patient left in no apparent distress sitting up in chair  [x] Patient left in no apparent distress in bed  [] Call bell left within reach  [x] Nursing notified  [] Caregiver present  [] Bed alarm activated    COMMUNICATION/EDUCATION:   The patients plan of care including findings, recommendations, and recommended diet changes were discussed with: Registered Nurse. Patient was educated regarding Her deficit(s) of dysarthria as this relates to Her diagnosis of CVA. She demonstrated Good understanding as evidenced by discussion. .  [] Posted safety precautions in patient's room. [x] Patient/family have participated as able and agree with findings and recommendations. [] Patient is unable to participate in plan of care at this time.     Thank you for this referral.  Marco Hughes, SOFYA  Time Calculation: 15 mins

## 2019-08-16 VITALS
OXYGEN SATURATION: 96 % | HEIGHT: 63 IN | TEMPERATURE: 97.8 F | RESPIRATION RATE: 18 BRPM | HEART RATE: 80 BPM | DIASTOLIC BLOOD PRESSURE: 80 MMHG | SYSTOLIC BLOOD PRESSURE: 136 MMHG | WEIGHT: 201.94 LBS | BODY MASS INDEX: 35.78 KG/M2

## 2019-08-16 LAB
ANION GAP SERPL CALC-SCNC: 7 MMOL/L (ref 5–15)
BACTERIA SPEC CULT: NORMAL
BUN SERPL-MCNC: 12 MG/DL (ref 6–20)
BUN/CREAT SERPL: 14 (ref 12–20)
CALCIUM SERPL-MCNC: 9.7 MG/DL (ref 8.5–10.1)
CC UR VC: NORMAL
CHLORIDE SERPL-SCNC: 102 MMOL/L (ref 97–108)
CO2 SERPL-SCNC: 28 MMOL/L (ref 21–32)
CREAT SERPL-MCNC: 0.88 MG/DL (ref 0.55–1.02)
ERYTHROCYTE [DISTWIDTH] IN BLOOD BY AUTOMATED COUNT: 12.1 % (ref 11.5–14.5)
GLUCOSE SERPL-MCNC: 81 MG/DL (ref 65–100)
HCT VFR BLD AUTO: 38.8 % (ref 35–47)
HGB BLD-MCNC: 12.5 G/DL (ref 11.5–16)
LEVETIRACETAM SERPL-MCNC: 13.9 UG/ML (ref 10–40)
MAGNESIUM SERPL-MCNC: 2.2 MG/DL (ref 1.6–2.4)
MCH RBC QN AUTO: 31.3 PG (ref 26–34)
MCHC RBC AUTO-ENTMCNC: 32.2 G/DL (ref 30–36.5)
MCV RBC AUTO: 97.2 FL (ref 80–99)
NRBC # BLD: 0 K/UL (ref 0–0.01)
NRBC BLD-RTO: 0 PER 100 WBC
PHOSPHATE SERPL-MCNC: 4.1 MG/DL (ref 2.6–4.7)
PLATELET # BLD AUTO: 181 K/UL (ref 150–400)
PMV BLD AUTO: 9 FL (ref 8.9–12.9)
POTASSIUM SERPL-SCNC: 3.7 MMOL/L (ref 3.5–5.1)
RBC # BLD AUTO: 3.99 M/UL (ref 3.8–5.2)
SERVICE CMNT-IMP: NORMAL
SODIUM SERPL-SCNC: 137 MMOL/L (ref 136–145)
WBC # BLD AUTO: 7.7 K/UL (ref 3.6–11)

## 2019-08-16 PROCEDURE — 74011250637 HC RX REV CODE- 250/637: Performed by: STUDENT IN AN ORGANIZED HEALTH CARE EDUCATION/TRAINING PROGRAM

## 2019-08-16 PROCEDURE — 74011250637 HC RX REV CODE- 250/637: Performed by: FAMILY MEDICINE

## 2019-08-16 PROCEDURE — 85027 COMPLETE CBC AUTOMATED: CPT

## 2019-08-16 PROCEDURE — 84100 ASSAY OF PHOSPHORUS: CPT

## 2019-08-16 PROCEDURE — 36415 COLL VENOUS BLD VENIPUNCTURE: CPT

## 2019-08-16 PROCEDURE — 83735 ASSAY OF MAGNESIUM: CPT

## 2019-08-16 PROCEDURE — 97530 THERAPEUTIC ACTIVITIES: CPT

## 2019-08-16 PROCEDURE — 99218 HC RM OBSERVATION: CPT

## 2019-08-16 PROCEDURE — 80048 BASIC METABOLIC PNL TOTAL CA: CPT

## 2019-08-16 PROCEDURE — 97116 GAIT TRAINING THERAPY: CPT

## 2019-08-16 RX ORDER — CARBAMAZEPINE 300 MG/1
300 CAPSULE, EXTENDED RELEASE ORAL DAILY
Qty: 30 CAP | Refills: 0 | Status: SHIPPED | OUTPATIENT
Start: 2019-08-17

## 2019-08-16 RX ORDER — CARBAMAZEPINE 200 MG/1
200 CAPSULE, EXTENDED RELEASE ORAL EVERY EVENING
Qty: 30 CAP | Refills: 0 | Status: SHIPPED | OUTPATIENT
Start: 2019-08-16

## 2019-08-16 RX ADMIN — ACETAMINOPHEN 650 MG: 325 TABLET ORAL at 06:08

## 2019-08-16 RX ADMIN — CARBAMAZEPINE 300 MG: 300 CAPSULE, EXTENDED RELEASE ORAL at 09:00

## 2019-08-16 RX ADMIN — LEVETIRACETAM 750 MG: 250 TABLET ORAL at 09:01

## 2019-08-16 RX ADMIN — Medication 10 ML: at 06:09

## 2019-08-16 RX ADMIN — CLONAZEPAM 0.5 MG: 0.5 TABLET ORAL at 09:00

## 2019-08-16 RX ADMIN — LAMOTRIGINE 200 MG: 100 TABLET ORAL at 09:01

## 2019-08-16 NOTE — PROGRESS NOTES
2115: TRANSFER - IN REPORT:    Verbal report received from AGUSTIN Lui(name) on Ruth Kim  being received from ED(unit) for routine progression of care      Report consisted of patients Situation, Background, Assessment and   Recommendations(SBAR). Information from the following report(s) SBAR, Kardex, MAR and Accordion was reviewed with the receiving nurse. Opportunity for questions and clarification was provided. Assessment completed upon patients arrival to unit and care assumed.      2215: Pt's home medications given to Security

## 2019-08-16 NOTE — PROGRESS NOTES
Problem: Mobility Impaired (Adult and Pediatric)  Goal: *Acute Goals and Plan of Care (Insert Text)  Description  FUNCTIONAL STATUS PRIOR TO ADMISSION: Patient was independent and active without use of DME. She reports occasional usage of grandchildren (16 yo's) when she has her seizure episodes. HOME SUPPORT PRIOR TO ADMISSION: The patient lived with daughter who works during the day, 8 grandchildren, boyfriend and 2 of her own children. [facility] Goals  Initiated 8/15/2019  1. Patient will move from supine to sit and sit to supine  in bed with supervision/set-up within 7 day(s). 2.  Patient will transfer from bed to chair and chair to bed with supervision/set-up using the least restrictive device within 7 day(s). 3.  Patient will perform sit to stand with supervision/set-up within 7 day(s). 4.  Patient will ambulate with supervision/set-up for 150 feet with the least restrictive device within 7 day(s). 5.  Patient will ascend/descend 6 stairs with 1 handrail(s) with minimal assistance/contact guard assist within 7 day(s). Outcome: Progressing Towards Goal  Note:   PHYSICAL THERAPY TREATMENT  Patient: Jennifer Erickson (70 y.o. female)  Date: 8/16/2019  Diagnosis: Stroke ([address] [I63.9]  Complex partial seizure ([address] [F28.779]  Dysarthria [R47.1]  Stroke ([address] [I63.9]  Complex partial seizure ([address] [U99.799]  Dysarthria [R47.1] Dysarthria       Precautions: Fall, Seizure  Chart, physical therapy assessment, plan of care and goals were reviewed. ASSESSMENT  Based on the objective data described below, pt demonstrates fair balance, using hip strategies to compensate. Requires CGA/ Min A for steadying with use of RW for tranfers and gait training, no Overt LOB. Performed supported standing reciprocal thera ex and  5 x sit<>stand with safe technique and verbal cues for eccentric control, adapted well.      Current Level of Function Impacting Discharge (mobility/balance): increased need for assistance with gait and tranfers for safety    Other factors to consider for discharge: has grandchildren to assist that are 16years old, however not good family support- daughter works during the day. Multiple young children in the home which patient reports she \"watches\"         PLAN :  Patient continues to benefit from skilled intervention to address the above impairments. Continue treatment per established plan of care. to address goals. Recommendation for discharge: (in order for the patient to meet his/her long term goals)  Physical therapy at least 2 days/week in the home AND ensure assist and/or supervision for safety with mobility tasks vs SNF     This discharge recommendation:  Has been made in collaboration with the attending provider and/or case management    Equipment recommendations for successful discharge (if) home: patient owns DME required for discharge       SUBJECTIVE:   Patient stated i am a little wobbly.     OBJECTIVE DATA SUMMARY:   Critical Behavior:  Neurologic State: Alert  Orientation Level: Oriented X4  Cognition: Follows commands  Safety/Judgement: Awareness of environment  Functional Mobility Training:  Bed Mobility:     Supine to Sit: Stand-by assistance;Assist x1              Transfers:  Sit to Stand: Contact guard assistance  Stand to Sit: Minimum assistance                             Balance:  Sitting: Intact  Standing: Impaired  Standing - Static: Constant support;Good  Standing - Dynamic : Fair  Ambulation/Gait Training:  Distance (ft): 60 Feet (ft)  Assistive Device: Walker, rolling;Gait belt  Ambulation - Level of Assistance: Contact guard assistance;Minimal assistance        Gait Abnormalities: Decreased step clearance                                      Stairs: Therapeutic Exercises:     Pain Rating:  Denies pain    Activity Tolerance:   Good  Please refer to the flowsheet for vital signs taken during this treatment.     After treatment patient left in no apparent distress:   Supine in bed, Call bell within reach and Bed / chair alarm activated    COMMUNICATION/COLLABORATION:   The patients plan of care was discussed with: Registered Nurse    Forest Oppenheim   Time Calculation: 24 mins

## 2019-08-16 NOTE — PROGRESS NOTES
Spiritual Care Assessment/Progress Note  [address]      NAME: Von Dwyer      MRN: [id number]  AGE: 47 y.o.  SEX: female  Mormon Affiliation: Jehovah's witness   Language: English     8/16/2019     Total Time (in minutes): 30     Spiritual Assessment begun in [facility] [address] through conversation with:         [x]Patient        [] Family    [] Friend(s)        Reason for Consult: Advance medical directive consult     Spiritual beliefs: (Please include comment if needed)     [x] Identifies with a julia tradition:         [] Supported by a julia community:            [] Claims no spiritual orientation:           [] Seeking spiritual identity:                [] Adheres to an individual form of spirituality:           [] Not able to assess:                           Identified resources for coping:      [] Prayer                               [] Music                  [] Guided Imagery     [] Family/friends                 [] Pet visits     [] Devotional reading                         [] Unknown     [] Other:                                  Interventions offered during this visit: (See comments for more details)    Patient Interventions: Affirmation of emotions/emotional suffering, Affirmation of julia, Catharsis/review of pertinent events in supportive environment, Iconic (affirming the presence of God/Higher Power), Initial/Spiritual assessment, patient floor, Prayer (assurance of), Prayer (actual)           Plan of Care:     [] Support spiritual and/or cultural needs    [] Support AMD and/or advance care planning process      [] Support grieving process   [] Coordinate Rites and/or Rituals    [] Coordination with community clergy   [] No spiritual needs identified at this time   [] Detailed Plan of Care below (See Comments)  [] Make referral to Music Therapy  [] Make referral to Pet Therapy     [] Make referral to Addiction services  [] Make referral to [facility]  [] Make referral to Spiritual Care Partner  [] No future visits requested        [x] Follow up visits as needed     Comments: Responded to Case Management request for and Advance Medical Directive (AMD) consult. Miss Rancho Kuo shared about her life and trying to care for her 7 grandchildren as  Her daughter works,  She has another daughter who has three grandchildren. She shared the difficulties she has had throughout life. Encouraged her to find someone she trusts to talk with so she can sort things out. She shared she has a [place] belief in [address]. Provide breathing meditaiton. .. In with the sprit. .. Out with the rest... Provided prayer. REcieved a page during visit did not get to the AMD.  Will follow as able and/or needed.   Visited by: Jens Cheng., MS., [address] [facility] [address] [place] ([id number])

## 2019-08-16 NOTE — PROGRESS NOTES
Pharmacist Discharge Medication Reconciliation    Discharge Provider:  Dr. Wolfgang Carrel     Discharge Medications:      My Medications        CHANGE how you take these medications        Instructions Each Dose to Equal Morning Noon Evening Bedtime   * carBAMazepine  mg capsule  Commonly known as:  CARBATROL ER  What changed: You were already taking a medication with the same name, and this prescription was added. Make sure you understand how and when to take each. Your last dose was:  8/15/19 @ 8:00 PM  Your next dose is:  8/16/19 @ bedtime      Take 1 Cap by mouth every evening. 200 mg         * carBAMazepine  mg capsule  Commonly known as:  CARBATROL ER  Start taking on:  8/17/2019  What changed:    when to take this  additional instructions  Your last dose was:  8/16/19 @ 9:00 AM  Your next dose is:  8/17/19 @ AM      Take 1 Cap by mouth daily. 300 mg               * This list has 2 medication(s) that are the same as other medications prescribed for you. Read the directions carefully, and ask your doctor or other care provider to review them with you. CONTINUE taking these medications        Instructions Each Dose to Equal Morning Noon Evening Bedtime   ascorbic acid (vitamin C) 250 mg tablet  Commonly known as:  VITAMIN C  Your last dose was:  PRIOR TO ADMISSION  Your next dose is:  8/16/19 @ evening      Take 250 mg by mouth every evening. 1700   250 mg         cholecalciferol (VITAMIN D3) 5,000 unit Tab tablet  Commonly known as:  VITAMIN D3  Your last dose was:  PRIOR TO ADMISSION  Your next dose is:  8/16/19 @ evening      Take 5,000 Units by mouth every evening. 1700   5,000 Units         clonazePAM 0.5 mg tablet  Commonly known as:  KlonoPIN  Your last dose was:  8/16/19 @ 9:00 AM  Your next dose is:  8/16/19 @ dinner      Take 0.5 mg by mouth three (3) times daily.  0900, 1700, 2100   0.5 mg         KEPPRA 750 mg tablet  Generic drug:  levETIRAcetam  Your last dose was: 8/16/19 @ 9:00 AM  Your next dose is:  8/16/19 @ bedtime      Take 750 mg by mouth two (2) times a day. 0900 and 2100   750 mg         lamoTRIgine 200 mg tablet  Commonly known as: LaMICtal  Your last dose was:  8/16/19 @ 9:00 AM  Your next dose is:  8/16/19 @ bedtime      Take 200 mg by mouth two (2) times a day. 0900 and 2100   200 mg         nystatin topical cream  Commonly known as:  MYCOSTATIN  Your last dose was:  PRIOR TO ADMISSION  Your next dose is:  AS NEEDED      Apply  to affected area daily as needed for Skin Irritation. Where to Get Your Medications        Information on where to get these meds will be given to you by the nurse or doctor.     Ask your nurse or doctor about these medications  carBAMazepine  mg capsule  carBAMazepine  mg capsule          The patient's chart, MAR, and AVS were reviewed by   Brandi Rae,   Contact: [phone]

## 2019-08-16 NOTE — ACP (ADVANCE CARE PLANNING)
Responded to Case Management request for an Advance Medical Directive (AMD) consult on [facility]. Miss Rao Dumont has three adult children, two daughter and a son. Provided her a very brief over view about an AMD.  She is being discharged. Left her an ADM form and Your Right to Decide Booklet for her to look at when she gets home. She has a significant other of 18 years.     Visited by: Yumiko Brooks, MS., [address] [facility] [place] ([id number])

## 2019-08-16 NOTE — PROGRESS NOTES
Transition of Care Plan:  1. Home with Outpatient Physical Therapy; pt's daughter will transport pt to appointments. Pt stated she has a rw. 2. Follow-up with PCP and neurology. PCP nurse navigator was notified of d/c. Pt was provided information on [facility]. 3. Family will transport pt home.      Lorelei Estes LCSW

## 2019-08-16 NOTE — PROGRESS NOTES
0745 - Bedside and Verbal shift change report given to damari bejarano (oncoming nurse) by Jade Singh (offgoing nurse). Report included the following information SBAR, Kardex, Intake/Output, MAR, Recent Results and Cardiac Rhythm NSR.   1410 - patient given discharge papers and instructions. Discharge papers reviewed with patient and all questions answered at this time.

## 2019-08-16 NOTE — PROGRESS NOTES
Responded to Case Management request for an Advance Medical Directive (AMD) consult on [facility]. Miss Katarzyna Leahy has three adult children, two daughter and a son. Provided her a very brief over view about an AMD.  She is being discharged. Left her an ADM form and Your Right to Decide Booklet for her to look at when she gets home. She has a significant other of 18 years. Provided assurance of prayers for the continuing journey.   Visited by: Malika Mahoney., MS., [address] [facility] [place] ([id number])

## 2019-08-16 NOTE — PROGRESS NOTES
HH order noted and discussed with pt. Pt is asking if she can do outpatient therapy instead of HH. She is staying with her daughter in [address] [place] else's home. \"  Pt stated that her daughter can transport her to appointments. Discussed with PT, Chico Dykes, who will work with pt again today. Pt indicated that she has a rolling walker. Her daughter will provide transportation at d/c. Rupali Snow LCSW    11:57am:  CM discussed pt with today's PT, Job Menjivar. Pt can participate in outpatient PT. Family Practice was notified. Pt will need a Rx for Outpatient PT.   Rupali Snow LCSW

## 2019-08-16 NOTE — PROGRESS NOTES
Bedside and Verbal shift change report given to Yousif Harrell RN (oncoming nurse) by Lazarus Pride RN (offgoing nurse). Report included the following information SBAR, Kardex, MAR and Accordion.

## 2019-08-16 NOTE — DISCHARGE INSTRUCTIONS
HOME DISCHARGE INSTRUCTIONS    Jo Ann Tate / [id number] : 1965    Admission date: 2019 Discharge date: 8/15/2019 9:46 AM     Please bring this form with you to show your care provider at your follow-up appointment. Primary care provider:  Chaya Graff NP    Discharging provider:  Max Garcia DO  - Family Medicine Resident  Dr. Ruth Hester Attending      You have been admitted to the hospital with the following diagnoses:    ACUTE DIAGNOSES:  Stroke [facility]) [I63.9]  Complex partial seizure ([facility] [W84.225]  Dysarthria [R47.1]  Stroke ([facility] [I63.9]  Complex partial seizure ([facility] [O62.463]  Dysarthria [R47.1]  . . . . . . . . . . . . . . . . . . . . . . . . . . . . . . . . . . . . . . . . . . . . . . . . . . . . . . . . . . . . . . . . . . . . . . . Lara January FOLLOW-UP CARE RECOMMENDATIONS:    Please follow with your PCP  Please attend your outpatient physical therapy   Please follow up with your Neurologist   Per patient- has scheduled appointment with [facility] in       MEDICATION CHANGES:    START taking one tab of 300mg Carbamazepine daily as well as one tab of 200mg Carbamazepine at night   CONTINUE all other current medications        Appointments  Follow-up Information     Follow up With Specialties Details Why Contact Info    Chaya Graff NP Family Practice On 2019 Please attend your follow up on  at 3:45pm with Dr. Live Richardson   [place] [address] [facility]  [phone]               Follow-up tests needed: none    Pending test results: At the time of your discharge the following test results are still pending: Urine culture  Please make sure you review these results with your outpatient follow-up provider(s). Specific symptoms to watch for: chest pain, shortness of breath, fever, chills, nausea, vomiting, diarrhea, change in mentation, falling, weakness, bleeding.      DIET/what to eat:  Regular Diet    ACTIVITY: Activity as tolerated    Wound care: none    Equipment needed:  none    What to do if new or unexpected symptoms occur? If you experience any of the above symptoms (or should other concerns or questions arise after discharge) please call your primary care physician. Return to the emergency room if you cannot get hold of your doctor. · It is very important that you keep your follow-up appointment(s). · Please bring discharge papers, medication list (and/or medication bottles) to your follow-up appointments for review by your outpatient provider(s). · Please check the list of medications and be sure it includes every medication (even non-prescription medications) that your provider wants you to take. · It is important that you take the medication exactly as they are prescribed. · Keep your medication in the bottles provided by the pharmacist and keep a list of the medication names, dosages, and times to be taken in your wallet. · Do not take other medications without consulting your doctor. · If you have any questions about your medications or other instructions, please talk to your nurse or care provider before you leave the hospital.     Information obtained by:     I understand that if any problems occur once I am at home I am to contact my physician. These instructions were explained to me and I had the opportunity to ask questions. I understand and acknowledge receipt of the instructions indicated above.                                                                                                                                                Physician's or R.N.'s Signature                                                                  Date/Time                                                                                                                                              Patient or Representative Signature Date/Time

## 2019-08-16 NOTE — PROGRESS NOTES
Pt states \"I had 1 or 2 seizures over the night\". When asked what happens when she experiences a seizure she states \"I get dizzy and feel confused\". She says that prior to this recent episode of slurred speech, blurred vision and difficulty walking she would have 2-3 seizures daily at baseline.

## 2019-08-17 LAB — LAMOTRIGINE SERPL-MCNC: 8.4 UG/ML (ref 2–20)

## 2019-10-07 NOTE — PROGRESS NOTES
----- Message from Raf Ramirez MD sent at 10/5/2019 11:42 AM CDT -----  Please notify patient      Significant arthritis and compression fractures    Recommend MRI if pain significant (ordered)   Problem: Mobility Impaired (Adult and Pediatric)  Goal: *Acute Goals and Plan of Care (Insert Text)  Physical Therapy Goals  Initiated 6/6/2017    1. Patient will move from supine to sit and sit to supine in bed with independence within 4 days. 2. Patient will perform sit to stand with modified independence within 4 days. 3. Patient will ambulate with modified independence for 150 feet with the least restrictive device within 4 days. 4. Patient will ascend/descend 4 stairs with 1 handrail(s) with supervision/set-up within 4 days. 5. Patient will verbalize and demonstrate understanding of spinal precautions (No bending, lifting greater than 5 lbs, or twisting; log-roll technique; frequent repositioning as instructed) within 4 days. PHYSICAL THERAPY TREATMENT  Patient: Tracy Spence (12 y.o. female)  Date: 6/7/2017  Diagnosis: CERVICAL STENOSIS, CERVICAL DISC HERNIATION   Cervical stenosis of spine <principal problem not specified>  Procedure(s) (LRB):  C5-7 ANTERIOR CERVICAL DISCECTOMY FUSION C5-7 ANTERIOR INSTRUMENTATION , PEAK CAGE, ALLOGRAFT, AUTOGRAFT (N/A) 2 Days Post-Op  Precautions: Spinal, Fall      ASSESSMENT:  Based on the objective data described below, patient tolerated treatment very well. Patient had Valium at 8 am  this morning reports pain 6/10 agreed with therapy. Ambulate without assistive device to and from the bathroom SBA, ambulate in the hallway occasionally holding on the wall rail. Up and down stairs min assist no loss of balance however patient unsteady with ambulation probably because had valium and feels sleepy during therapy. Patient not yet cleared this morning communicated with nurse and agreed to monitor patient. Assisted back to bed supine after therapy, activated bed alarm daughters in the room during the entire therapy sessions. We will continue to follow up with the patient for therapy. Thank you.   Progression toward goals:  [X]    Improving appropriately and progressing toward goals  [ ]    Improving slowly and progressing toward goals  [ ]    Not making progress toward goals and plan of care will be adjusted       PLAN:  Patient continues to benefit from skilled intervention to address the above impairments. Continue treatment per established plan of care. Discharge Recommendations:  Home Health and To Be Determined  Further Equipment Recommendations for Discharge:  TBD       SUBJECTIVE:   Patient stated Ok but I feel sleepy I had valium this morning.       OBJECTIVE DATA SUMMARY:   Critical Behavior:  Neurologic State: Alert  Orientation Level: Oriented X4  Cognition: Appropriate safety awareness, Appropriate for age attention/concentration, Appropriate decision making  Safety/Judgement: Insight into deficits, Good awareness of safety precautions  Functional Mobility Training:  Bed Mobility:  Rolling: Independent  Supine to Sit: Independent  Sit to Supine: Independent  Scooting: Independent        Transfers:  Sit to Stand: Stand-by asssistance  Stand to Sit: Stand-by asssistance  Stand Pivot Transfers: Stand-by asssistance     Bed to Chair: Stand-by asssistance                    Balance:  Sitting: Intact  Standing: Impaired; Without support  Standing - Static: Fair  Standing - Dynamic : Fair  Ambulation/Gait Training:  Distance (ft): 100 Feet (ft)     Ambulation - Level of Assistance: Minimal assistance     Gait Description (WDL): Exceptions to WDL  Gait Abnormalities: Path deviations        Base of Support: Widened     Speed/Kimber: Fluctuations                                  Stairs:  Number of Stairs Trained: 4  Stairs - Level of Assistance: Minimum assistance              Rail Use: Both     Therapeutic Exercises:    Instructed patient to continue active range of motion exercise on both legs while up on chair or on bed.       Pain:  Pain Scale 1: Numeric (0 - 10)  Pain Intensity 1: 6           Pain Intervention(s) 1: Medication (see MAR)  Activity Tolerance: Good.  Please refer to the flowsheet for vital signs taken during this treatment. After treatment:   [ ]    Patient left in no apparent distress sitting up in chair  [X]    Patient left in no apparent distress in bed  [X]    Call bell left within reach  [X]    Nursing notified  [X]    Caregiver present  [X]    Bed alarm activated      COMMUNICATION/COLLABORATION:   The patients plan of care was discussed with: Registered Nurse and patient     Andrea Chu PT,WCC.    Time Calculation: 24 mins

## 2021-02-07 NOTE — PERIOP NOTES
reviewed the office note from [facility] 2/21/17 and the visit from [address] [place] [address] 4/11/17 and can proceed with surgery. oral

## 2022-03-18 PROBLEM — R65.10 SIRS (SYSTEMIC INFLAMMATORY RESPONSE SYNDROME) (HCC): Status: ACTIVE | Noted: 2017-06-12

## 2022-03-18 PROBLEM — I63.9 STROKE (HCC): Status: ACTIVE | Noted: 2019-08-14

## 2022-03-19 PROBLEM — G40.209 COMPLEX PARTIAL SEIZURE (HCC): Status: ACTIVE | Noted: 2019-08-14

## 2022-03-19 PROBLEM — R47.1 DYSARTHRIA: Status: ACTIVE | Noted: 2019-08-14

## 2022-03-19 PROBLEM — M48.02 CERVICAL STENOSIS OF SPINE: Status: ACTIVE | Noted: 2017-06-05

## 2023-07-14 ENCOUNTER — HOSPITAL ENCOUNTER (EMERGENCY)
Facility: HOSPITAL | Age: 58
Discharge: HOME | End: 2023-07-14
Attending: EMERGENCY MEDICINE
Payer: MEDICAID

## 2023-07-14 ENCOUNTER — APPOINTMENT (OUTPATIENT)
Facility: HOSPITAL | Age: 58
End: 2023-07-14
Payer: MEDICAID

## 2023-07-14 VITALS
BODY MASS INDEX: 44.3 KG/M2 | OXYGEN SATURATION: 94 % | DIASTOLIC BLOOD PRESSURE: 81 MMHG | SYSTOLIC BLOOD PRESSURE: 157 MMHG | HEART RATE: 83 BPM | TEMPERATURE: 97.9 F | HEIGHT: 63 IN | WEIGHT: 250 LBS | RESPIRATION RATE: 18 BRPM

## 2023-07-14 DIAGNOSIS — K59.00 CONSTIPATION, UNSPECIFIED CONSTIPATION TYPE: ICD-10-CM

## 2023-07-14 DIAGNOSIS — M54.16 LUMBAR RADICULOPATHY: Primary | ICD-10-CM

## 2023-07-14 LAB
AMPHET UR QL SCN: NEGATIVE
APPEARANCE UR: ABNORMAL
BACTERIA URNS QL MICRO: NEGATIVE /HPF
BARBITURATES UR QL SCN: NEGATIVE
BENZODIAZ UR QL: NEGATIVE
BILIRUB UR QL: NEGATIVE
CANNABINOIDS UR QL SCN: NEGATIVE
COCAINE UR QL SCN: NEGATIVE
COLOR UR: ABNORMAL
EPITH CASTS URNS QL MICRO: ABNORMAL /LPF
GLUCOSE UR STRIP.AUTO-MCNC: NEGATIVE MG/DL
HGB UR QL STRIP: NEGATIVE
KETONES UR QL STRIP.AUTO: NEGATIVE MG/DL
LEUKOCYTE ESTERASE UR QL STRIP.AUTO: ABNORMAL
Lab: NORMAL
METHADONE UR QL: NEGATIVE
NITRITE UR QL STRIP.AUTO: NEGATIVE
OPIATES UR QL: NEGATIVE
PCP UR QL: NEGATIVE
PH UR STRIP: 7 (ref 5–8)
PROT UR STRIP-MCNC: NEGATIVE MG/DL
RBC #/AREA URNS HPF: ABNORMAL /HPF (ref 0–5)
SP GR UR REFRACTOMETRY: 1.02 (ref 1–1.03)
SPECIMEN HOLD: NORMAL
UROBILINOGEN UR QL STRIP.AUTO: 0.2 EU/DL (ref 0.2–1)
WBC URNS QL MICRO: ABNORMAL /HPF (ref 0–4)

## 2023-07-14 PROCEDURE — 72100 X-RAY EXAM L-S SPINE 2/3 VWS: CPT

## 2023-07-14 PROCEDURE — 81001 URINALYSIS AUTO W/SCOPE: CPT

## 2023-07-14 PROCEDURE — 99284 EMERGENCY DEPT VISIT MOD MDM: CPT

## 2023-07-14 PROCEDURE — 96372 THER/PROPH/DIAG INJ SC/IM: CPT

## 2023-07-14 PROCEDURE — 6360000002 HC RX W HCPCS: Performed by: EMERGENCY MEDICINE

## 2023-07-14 PROCEDURE — 80307 DRUG TEST PRSMV CHEM ANLYZR: CPT

## 2023-07-14 PROCEDURE — 6370000000 HC RX 637 (ALT 250 FOR IP): Performed by: EMERGENCY MEDICINE

## 2023-07-14 RX ORDER — SENNA AND DOCUSATE SODIUM 50; 8.6 MG/1; MG/1
2 TABLET, FILM COATED ORAL ONCE
Status: COMPLETED | OUTPATIENT
Start: 2023-07-14 | End: 2023-07-14

## 2023-07-14 RX ORDER — KETOROLAC TROMETHAMINE 30 MG/ML
30 INJECTION, SOLUTION INTRAMUSCULAR; INTRAVENOUS
Status: COMPLETED | OUTPATIENT
Start: 2023-07-14 | End: 2023-07-14

## 2023-07-14 RX ORDER — OXYCODONE HYDROCHLORIDE AND ACETAMINOPHEN 5; 325 MG/1; MG/1
1 TABLET ORAL
Status: COMPLETED | OUTPATIENT
Start: 2023-07-14 | End: 2023-07-14

## 2023-07-14 RX ORDER — DIAZEPAM 2 MG/1
2 TABLET ORAL ONCE
Status: COMPLETED | OUTPATIENT
Start: 2023-07-14 | End: 2023-07-14

## 2023-07-14 RX ORDER — ACETAMINOPHEN 325 MG/1
650 TABLET ORAL
Status: COMPLETED | OUTPATIENT
Start: 2023-07-14 | End: 2023-07-14

## 2023-07-14 RX ADMIN — KETOROLAC TROMETHAMINE 30 MG: 30 INJECTION, SOLUTION INTRAMUSCULAR; INTRAVENOUS at 19:43

## 2023-07-14 RX ADMIN — OXYCODONE HYDROCHLORIDE AND ACETAMINOPHEN 1 TABLET: 5; 325 TABLET ORAL at 19:43

## 2023-07-14 RX ADMIN — ACETAMINOPHEN 650 MG: 325 TABLET ORAL at 19:43

## 2023-07-14 RX ADMIN — DIAZEPAM 2 MG: 2 TABLET ORAL at 19:43

## 2023-07-14 RX ADMIN — SENNOSIDES AND DOCUSATE SODIUM 2 TABLET: 50; 8.6 TABLET ORAL at 18:44

## 2023-07-14 ASSESSMENT — PAIN SCALES - GENERAL: PAINLEVEL_OUTOF10: 10

## 2023-07-14 ASSESSMENT — PAIN - FUNCTIONAL ASSESSMENT: PAIN_FUNCTIONAL_ASSESSMENT: 0-10

## 2023-07-14 ASSESSMENT — ENCOUNTER SYMPTOMS: BACK PAIN: 1

## 2023-07-14 NOTE — ED TRIAGE NOTES
Patient states has had chronic low back pain. Pain has worsened recently. States was seen at [facility] the other day and was told she did not have a UTI but they wrote an antibiotic for her just to see if it helps. Patient states they did not prescribe anything for pain. No injury. Patient states the pain is in the middle of the low back and radiates into the left low back and left hip, and also is now starting to radiate into the right low back.

## 2023-07-15 NOTE — ED NOTES
Reviewed pt's discharge paperwork with pt. Pt will wait in ER until daughter arrives.      Nancy Coombs RN  07/14/23 2415

## 2023-07-15 NOTE — DISCHARGE INSTRUCTIONS
You had prescriptions for pain medications sent to the following pharmacy yesterday:   Pharmacy: [facility]/pharmacy #[id number] [facility] [phone] - F [phone]    Please take these medications as directed

## 2023-08-23 ENCOUNTER — TELEMEDICINE (OUTPATIENT)
Age: 58
End: 2023-08-23
Payer: MEDICAID

## 2023-08-23 DIAGNOSIS — M54.50 CHRONIC BILATERAL LOW BACK PAIN WITHOUT SCIATICA: ICD-10-CM

## 2023-08-23 DIAGNOSIS — E54 VITAMIN C DEFICIENCY: ICD-10-CM

## 2023-08-23 DIAGNOSIS — G89.29 CHRONIC BILATERAL LOW BACK PAIN WITHOUT SCIATICA: ICD-10-CM

## 2023-08-23 DIAGNOSIS — E55.9 VITAMIN D DEFICIENCY: Primary | ICD-10-CM

## 2023-08-23 PROCEDURE — 99213 OFFICE O/P EST LOW 20 MIN: CPT | Performed by: NURSE PRACTITIONER

## 2023-08-23 RX ORDER — ASCORBIC ACID 250 MG
250 TABLET ORAL DAILY
Qty: 30 TABLET | Refills: 5 | Status: SHIPPED | OUTPATIENT
Start: 2023-08-23

## 2023-08-23 RX ORDER — LEVETIRACETAM 750 MG/1
750 TABLET ORAL EVERY 12 HOURS
COMMUNITY
Start: 2023-07-31

## 2023-08-23 RX ORDER — LAMOTRIGINE 200 MG/1
200 TABLET ORAL 2 TIMES DAILY
COMMUNITY

## 2023-08-23 RX ORDER — MELATONIN
1000 DAILY
Qty: 30 TABLET | Refills: 5 | Status: SHIPPED | OUTPATIENT
Start: 2023-08-23

## 2023-08-23 RX ORDER — CLONAZEPAM 0.5 MG/1
0.5 TABLET ORAL 3 TIMES DAILY PRN
COMMUNITY

## 2023-08-23 RX ORDER — CARBAMAZEPINE 200 MG/1
300 TABLET ORAL 2 TIMES DAILY
COMMUNITY

## 2023-08-26 NOTE — PROGRESS NOTES
Marilee Staton (:  1965) is a Established patient, presenting virtually for evaluation of the following:    Assessment & Plan   Below is the assessment and plan developed based on review of pertinent history, physical exam, labs, studies, and medications. Freeman Arndt was seen today for back pain. Diagnoses and all orders for this visit:    Vitamin D deficiency    Vitamin C deficiency    Chronic bilateral low back pain without sciatica    Other orders  -     ascorbic acid (V-R VITAMIN C) 250 MG tablet; Take 1 tablet by mouth daily  -     vitamin D3 (CHOLECALCIFEROL) 25 MCG (1000 UT) TABS tablet;  Take 1 tablet by mouth daily      Refill updated today  Follow up prn back pain    Subjective   HPI  Patient requesting refill of her vitamins she takes  Both vit C and D  She is also having intermittent low back pain  Denies injury    Review of Systems   A comprehensive review of system was obtained and negative except findings in the HPI      Objective   Patient-Reported Vitals  No data recorded     Physical Exam  [INSTRUCTIONS:  \"[x]\" Indicates a positive item  \"[]\" Indicates a negative item  -- DELETE ALL ITEMS NOT EXAMINED]    Constitutional: [x] Appears well-developed and well-nourished [x] No apparent distress      [] Abnormal -     Mental status: [x] Alert and awake  [x] Oriented to person/place/time [x] Able to follow commands    [] Abnormal -     Eyes:   EOM    [x]  Normal    [] Abnormal -   Sclera  [x]  Normal    [] Abnormal -          Discharge [x]  None visible   [] Abnormal -     HENT: [x] Normocephalic, atraumatic  [] Abnormal -   [x] Mouth/Throat: Mucous membranes are moist    External Ears [x] Normal  [] Abnormal -    Neck: [x] No visualized mass [] Abnormal -     Pulmonary/Chest: [x] Respiratory effort normal   [x] No visualized signs of difficulty breathing or respiratory distress        [] Abnormal -      Musculoskeletal:   [x] Normal gait with no signs of ataxia         [x] Normal range

## 2024-03-05 RX ORDER — MELATONIN
1 DAILY
Qty: 30 TABLET | Refills: 5 | Status: SHIPPED | OUTPATIENT
Start: 2024-03-05

## 2024-03-05 RX ORDER — ASCORBIC ACID 250 MG
250 TABLET ORAL DAILY
Qty: 30 TABLET | Refills: 5 | Status: SHIPPED | OUTPATIENT
Start: 2024-03-05

## 2025-01-27 RX ORDER — ASCORBIC ACID 250 MG
250 TABLET ORAL DAILY
Qty: 30 TABLET | Refills: 5 | Status: SHIPPED | OUTPATIENT
Start: 2025-01-27

## 2025-04-09 ENCOUNTER — APPOINTMENT (OUTPATIENT)
Facility: HOSPITAL | Age: 60
End: 2025-04-09
Payer: MEDICAID

## 2025-04-09 ENCOUNTER — HOSPITAL ENCOUNTER (OUTPATIENT)
Facility: HOSPITAL | Age: 60
Setting detail: OBSERVATION
Discharge: HOME | End: 2025-04-11
Attending: STUDENT IN AN ORGANIZED HEALTH CARE EDUCATION/TRAINING PROGRAM | Admitting: FAMILY MEDICINE
Payer: MEDICAID

## 2025-04-09 DIAGNOSIS — I63.9 CEREBROVASCULAR ACCIDENT (CVA), UNSPECIFIED MECHANISM (HCC): ICD-10-CM

## 2025-04-09 DIAGNOSIS — R47.1 DYSARTHRIA: Primary | ICD-10-CM

## 2025-04-09 LAB
ALBUMIN SERPL-MCNC: 3.7 G/DL (ref 3.5–5)
ALBUMIN/GLOB SERPL: 0.9 (ref 1.1–2.2)
ALP SERPL-CCNC: 121 U/L (ref 45–117)
ALT SERPL-CCNC: 17 U/L (ref 12–78)
AMMONIA PLAS-SCNC: 22 UMOL/L
ANION GAP SERPL CALC-SCNC: 5 MMOL/L (ref 2–12)
APPEARANCE UR: CLEAR
AST SERPL-CCNC: 9 U/L (ref 15–37)
BACTERIA URNS QL MICRO: ABNORMAL /HPF
BASOPHILS # BLD: 0.03 K/UL (ref 0–0.1)
BASOPHILS NFR BLD: 0.4 % (ref 0–1)
BILIRUB SERPL-MCNC: 0.3 MG/DL (ref 0.2–1)
BILIRUB UR QL: NEGATIVE
BUN SERPL-MCNC: 9 MG/DL (ref 6–20)
BUN/CREAT SERPL: 9 (ref 12–20)
CALCIUM SERPL-MCNC: 10.1 MG/DL (ref 8.5–10.1)
CARBAMAZEPINE SERPL-MCNC: 14.2 UG/ML (ref 4–12)
CHLORIDE SERPL-SCNC: 105 MMOL/L (ref 97–108)
CO2 SERPL-SCNC: 31 MMOL/L (ref 21–32)
COLOR UR: ABNORMAL
COMMENT:: NORMAL
CREAT SERPL-MCNC: 0.96 MG/DL (ref 0.55–1.02)
DIFFERENTIAL METHOD BLD: ABNORMAL
EKG ATRIAL RATE: 73 BPM
EKG DIAGNOSIS: NORMAL
EKG P AXIS: 50 DEGREES
EKG P-R INTERVAL: 178 MS
EKG Q-T INTERVAL: 384 MS
EKG QRS DURATION: 88 MS
EKG QTC CALCULATION (BAZETT): 423 MS
EKG R AXIS: 42 DEGREES
EKG T AXIS: 66 DEGREES
EKG VENTRICULAR RATE: 73 BPM
EOSINOPHIL # BLD: 0.06 K/UL (ref 0–0.4)
EOSINOPHIL NFR BLD: 0.7 % (ref 0–7)
EPITH CASTS URNS QL MICRO: ABNORMAL /LPF
ERYTHROCYTE [DISTWIDTH] IN BLOOD BY AUTOMATED COUNT: 12.2 % (ref 11.5–14.5)
GLOBULIN SER CALC-MCNC: 4 G/DL (ref 2–4)
GLUCOSE SERPL-MCNC: 91 MG/DL (ref 65–100)
GLUCOSE UR STRIP.AUTO-MCNC: NEGATIVE MG/DL
HCT VFR BLD AUTO: 36.3 % (ref 35–47)
HGB BLD-MCNC: 12.4 G/DL (ref 11.5–16)
HGB UR QL STRIP: NEGATIVE
HYALINE CASTS URNS QL MICRO: ABNORMAL /LPF (ref 0–2)
IMM GRANULOCYTES # BLD AUTO: 0.04 K/UL (ref 0–0.04)
IMM GRANULOCYTES NFR BLD AUTO: 0.5 % (ref 0–0.5)
INR PPP: 1 (ref 0.9–1.1)
KETONES UR QL STRIP.AUTO: NEGATIVE MG/DL
LEUKOCYTE ESTERASE UR QL STRIP.AUTO: ABNORMAL
LYMPHOCYTES # BLD: 2.65 K/UL (ref 0.8–3.5)
LYMPHOCYTES NFR BLD: 31.4 % (ref 12–49)
MCH RBC QN AUTO: 31.4 PG (ref 26–34)
MCHC RBC AUTO-ENTMCNC: 34.2 G/DL (ref 30–36.5)
MCV RBC AUTO: 91.9 FL (ref 80–99)
MONOCYTES # BLD: 0.36 K/UL (ref 0–1)
MONOCYTES NFR BLD: 4.3 % (ref 5–13)
NEUTS SEG # BLD: 5.3 K/UL (ref 1.8–8)
NEUTS SEG NFR BLD: 62.7 % (ref 32–75)
NITRITE UR QL STRIP.AUTO: NEGATIVE
NRBC # BLD: 0 K/UL (ref 0–0.01)
NRBC BLD-RTO: 0 PER 100 WBC
PH UR STRIP: 7.5 (ref 5–8)
PLATELET # BLD AUTO: 204 K/UL (ref 150–400)
PMV BLD AUTO: 9 FL (ref 8.9–12.9)
POTASSIUM SERPL-SCNC: 3.6 MMOL/L (ref 3.5–5.1)
PROT SERPL-MCNC: 7.7 G/DL (ref 6.4–8.2)
PROT UR STRIP-MCNC: NEGATIVE MG/DL
PROTHROMBIN TIME: 10.7 SEC (ref 9.2–11.2)
RBC # BLD AUTO: 3.95 M/UL (ref 3.8–5.2)
RBC #/AREA URNS HPF: ABNORMAL /HPF (ref 0–5)
SODIUM SERPL-SCNC: 141 MMOL/L (ref 136–145)
SP GR UR REFRACTOMETRY: 1.01 (ref 1–1.03)
SPECIMEN HOLD: NORMAL
UROBILINOGEN UR QL STRIP.AUTO: 0.2 EU/DL (ref 0.2–1)
WBC # BLD AUTO: 8.4 K/UL (ref 3.6–11)
WBC URNS QL MICRO: ABNORMAL /HPF (ref 0–4)

## 2025-04-09 PROCEDURE — 82140 ASSAY OF AMMONIA: CPT

## 2025-04-09 PROCEDURE — G0378 HOSPITAL OBSERVATION PER HR: HCPCS

## 2025-04-09 PROCEDURE — 81001 URINALYSIS AUTO W/SCOPE: CPT

## 2025-04-09 PROCEDURE — 99285 EMERGENCY DEPT VISIT HI MDM: CPT

## 2025-04-09 PROCEDURE — 36415 COLL VENOUS BLD VENIPUNCTURE: CPT

## 2025-04-09 PROCEDURE — 70551 MRI BRAIN STEM W/O DYE: CPT

## 2025-04-09 PROCEDURE — 93005 ELECTROCARDIOGRAM TRACING: CPT | Performed by: STUDENT IN AN ORGANIZED HEALTH CARE EDUCATION/TRAINING PROGRAM

## 2025-04-09 PROCEDURE — 6360000002 HC RX W HCPCS: Performed by: FAMILY MEDICINE

## 2025-04-09 PROCEDURE — 96372 THER/PROPH/DIAG INJ SC/IM: CPT

## 2025-04-09 PROCEDURE — 70450 CT HEAD/BRAIN W/O DYE: CPT

## 2025-04-09 PROCEDURE — 85025 COMPLETE CBC W/AUTO DIFF WBC: CPT

## 2025-04-09 PROCEDURE — 94761 N-INVAS EAR/PLS OXIMETRY MLT: CPT

## 2025-04-09 PROCEDURE — 80053 COMPREHEN METABOLIC PANEL: CPT

## 2025-04-09 PROCEDURE — 70498 CT ANGIOGRAPHY NECK: CPT

## 2025-04-09 PROCEDURE — 93010 ELECTROCARDIOGRAM REPORT: CPT | Performed by: SPECIALIST

## 2025-04-09 PROCEDURE — 80175 DRUG SCREEN QUAN LAMOTRIGINE: CPT

## 2025-04-09 PROCEDURE — 85610 PROTHROMBIN TIME: CPT

## 2025-04-09 PROCEDURE — 6370000000 HC RX 637 (ALT 250 FOR IP): Performed by: FAMILY MEDICINE

## 2025-04-09 PROCEDURE — 80177 DRUG SCRN QUAN LEVETIRACETAM: CPT

## 2025-04-09 PROCEDURE — 2500000003 HC RX 250 WO HCPCS: Performed by: FAMILY MEDICINE

## 2025-04-09 PROCEDURE — 6360000004 HC RX CONTRAST MEDICATION: Performed by: STUDENT IN AN ORGANIZED HEALTH CARE EDUCATION/TRAINING PROGRAM

## 2025-04-09 PROCEDURE — 80156 ASSAY CARBAMAZEPINE TOTAL: CPT

## 2025-04-09 RX ORDER — LAMOTRIGINE 100 MG/1
200 TABLET ORAL EVERY 12 HOURS
Status: DISCONTINUED | OUTPATIENT
Start: 2025-04-09 | End: 2025-04-11 | Stop reason: HOSPADM

## 2025-04-09 RX ORDER — CARBAMAZEPINE 200 MG/1
300 TABLET ORAL 2 TIMES DAILY
COMMUNITY

## 2025-04-09 RX ORDER — CLONAZEPAM 0.5 MG/1
0.5 TABLET ORAL 3 TIMES DAILY PRN
Status: DISCONTINUED | OUTPATIENT
Start: 2025-04-09 | End: 2025-04-11 | Stop reason: HOSPADM

## 2025-04-09 RX ORDER — CARBAMAZEPINE 300 MG/1
300 CAPSULE, EXTENDED RELEASE ORAL 2 TIMES DAILY
COMMUNITY
Start: 2025-03-28 | End: 2025-04-09

## 2025-04-09 RX ORDER — SODIUM CHLORIDE 9 MG/ML
INJECTION, SOLUTION INTRAVENOUS PRN
Status: DISCONTINUED | OUTPATIENT
Start: 2025-04-09 | End: 2025-04-11 | Stop reason: HOSPADM

## 2025-04-09 RX ORDER — SODIUM CHLORIDE 0.9 % (FLUSH) 0.9 %
5-40 SYRINGE (ML) INJECTION EVERY 12 HOURS SCHEDULED
Status: DISCONTINUED | OUTPATIENT
Start: 2025-04-09 | End: 2025-04-11 | Stop reason: HOSPADM

## 2025-04-09 RX ORDER — IOPAMIDOL 755 MG/ML
100 INJECTION, SOLUTION INTRAVASCULAR
Status: COMPLETED | OUTPATIENT
Start: 2025-04-09 | End: 2025-04-09

## 2025-04-09 RX ORDER — POLYETHYLENE GLYCOL 3350 17 G/17G
17 POWDER, FOR SOLUTION ORAL DAILY PRN
Status: DISCONTINUED | OUTPATIENT
Start: 2025-04-09 | End: 2025-04-11 | Stop reason: HOSPADM

## 2025-04-09 RX ORDER — CARBAMAZEPINE 300 MG/1
300 CAPSULE, EXTENDED RELEASE ORAL EVERY 12 HOURS
Status: DISCONTINUED | OUTPATIENT
Start: 2025-04-09 | End: 2025-04-11 | Stop reason: HOSPADM

## 2025-04-09 RX ORDER — ENOXAPARIN SODIUM 100 MG/ML
40 INJECTION SUBCUTANEOUS DAILY
Status: DISCONTINUED | OUTPATIENT
Start: 2025-04-09 | End: 2025-04-11 | Stop reason: HOSPADM

## 2025-04-09 RX ORDER — ASPIRIN 81 MG/1
81 TABLET, CHEWABLE ORAL DAILY
Status: DISCONTINUED | OUTPATIENT
Start: 2025-04-09 | End: 2025-04-09

## 2025-04-09 RX ORDER — SODIUM CHLORIDE 0.9 % (FLUSH) 0.9 %
5-40 SYRINGE (ML) INJECTION PRN
Status: DISCONTINUED | OUTPATIENT
Start: 2025-04-09 | End: 2025-04-11 | Stop reason: HOSPADM

## 2025-04-09 RX ORDER — ROSUVASTATIN CALCIUM 10 MG/1
40 TABLET, COATED ORAL NIGHTLY
Status: DISCONTINUED | OUTPATIENT
Start: 2025-04-09 | End: 2025-04-11 | Stop reason: HOSPADM

## 2025-04-09 RX ORDER — ASCORBIC ACID 500 MG
250 TABLET ORAL EVERY 24 HOURS
Status: DISCONTINUED | OUTPATIENT
Start: 2025-04-10 | End: 2025-04-11

## 2025-04-09 RX ORDER — ASPIRIN 300 MG/1
300 SUPPOSITORY RECTAL DAILY
Status: DISCONTINUED | OUTPATIENT
Start: 2025-04-09 | End: 2025-04-09

## 2025-04-09 RX ORDER — ONDANSETRON 2 MG/ML
4 INJECTION INTRAMUSCULAR; INTRAVENOUS EVERY 6 HOURS PRN
Status: DISCONTINUED | OUTPATIENT
Start: 2025-04-09 | End: 2025-04-11 | Stop reason: HOSPADM

## 2025-04-09 RX ORDER — LEVETIRACETAM 500 MG/1
750 TABLET ORAL EVERY 12 HOURS
Status: DISCONTINUED | OUTPATIENT
Start: 2025-04-09 | End: 2025-04-11 | Stop reason: HOSPADM

## 2025-04-09 RX ORDER — ONDANSETRON 4 MG/1
4 TABLET, ORALLY DISINTEGRATING ORAL EVERY 8 HOURS PRN
Status: DISCONTINUED | OUTPATIENT
Start: 2025-04-09 | End: 2025-04-11 | Stop reason: HOSPADM

## 2025-04-09 RX ADMIN — IOPAMIDOL 100 ML: 755 INJECTION, SOLUTION INTRAVENOUS at 15:44

## 2025-04-09 RX ADMIN — CLONAZEPAM 0.5 MG: 0.5 TABLET ORAL at 22:22

## 2025-04-09 RX ADMIN — LAMOTRIGINE 200 MG: 100 TABLET ORAL at 22:22

## 2025-04-09 RX ADMIN — ENOXAPARIN SODIUM 40 MG: 100 INJECTION SUBCUTANEOUS at 22:23

## 2025-04-09 RX ADMIN — ROSUVASTATIN CALCIUM 40 MG: 10 TABLET, FILM COATED ORAL at 22:21

## 2025-04-09 RX ADMIN — CARBAMAZEPINE 300 MG: 300 CAPSULE, EXTENDED RELEASE ORAL at 22:44

## 2025-04-09 RX ADMIN — SODIUM CHLORIDE, PRESERVATIVE FREE 10 ML: 5 INJECTION INTRAVENOUS at 22:24

## 2025-04-09 RX ADMIN — LEVETIRACETAM 750 MG: 500 TABLET, FILM COATED ORAL at 22:21

## 2025-04-09 ASSESSMENT — LIFESTYLE VARIABLES
HOW MANY STANDARD DRINKS CONTAINING ALCOHOL DO YOU HAVE ON A TYPICAL DAY: PATIENT DOES NOT DRINK
HOW OFTEN DO YOU HAVE A DRINK CONTAINING ALCOHOL: NEVER

## 2025-04-09 ASSESSMENT — PAIN - FUNCTIONAL ASSESSMENT: PAIN_FUNCTIONAL_ASSESSMENT: NONE - DENIES PAIN

## 2025-04-09 NOTE — ED PROVIDER NOTES
Patient signed out to me by Dr. Sahu at 1530.  I reexamined the patient after her imaging had resulted.  She has a history of seizures, presented with dysarthria since yesterday and ataxia for several days, deemed not to be a candidate for systemic TNK per the previous physician given chronicity of symptoms.  CT head/CTA head and neck unremarkable.  Speech still not returned to baseline on my reassessment.  Needs admission for MRI imaging and evaluation by neurology given concern for ischemic stroke.  Will admit for continued management, discussed with the family medicine service.      Perfect Serve Consult for Admission  6:31 PM    ED Room Number:   ER17/17  Patient Name and age:   Brandy Dennison 59 y.o.  female  Working Diagnosis:     1. Dysarthria      COVID-19 Suspicion:   No  Sepsis present:    No   Code Status:     Full Code  Readmission:    No  Isolation Requirements:  no  Recommended LOC:   telemetry  Department:    [place] [facility] - [phone]  Other:     History of seizures, presenting with dysarthria since yesterday and ataxia for several days, deemed not to be a candidate for TNK given chronicity of symptoms.  CT head/CTA head and neck unremarkable.  Speech still not returned to baseline.  Needs admission for MRI imaging and evaluation by neurology given concern for ischemic stroke.        Venessa Mead MD  04/11/25 [id number]

## 2025-04-09 NOTE — ED TRIAGE NOTES
Patient arrives to ed via EMS. Per pt, started with bilateral leg weakness x 3 days ago, head heaviness x 2 days, slurred speech since last night around 9pm, issues \"getting words out\" since this morning. Pt sts she has been to  ER as well as [facility] In the last 3 days. Per pt, [facility] stated pt had UTI and was told to go to ER in which  ER said everything looked good. Pt denies any further complaints. Pt seen by dr tomlin on arrival

## 2025-04-09 NOTE — ED PROVIDER NOTES
(KLONOPIN) 0.5 MG TABLET    Take 1 tablet by mouth 3 times daily as needed. Max Daily Amount: 1.5 mg    [facility] VITAMIN C 250 MG TABLET    TAKE 1 TABLET BY MOUTH EVERY DAY    LAMOTRIGINE (LAMICTAL) 200 MG TABLET    Take 1 tablet by mouth 2 times daily    LEVETIRACETAM (KEPPRA) 750 MG TABLET    Take 1 tablet by mouth in the morning and 1 tablet in the evening.    VITAMIN D3 25 MCG (1000 UT) TABS TABLET    TAKE 1 TABLET BY MOUTH EVERY DAY       ALLERGIES     Aspirin, Codeine, and Phenobarbital    FAMILY HISTORY       Family History   Problem Relation Age of Onset    Diabetes Maternal Grandmother     Emphysema Mother     Hypertension Father           SOCIAL HISTORY       Social History     Socioeconomic History    Marital status: Single   Tobacco Use    Smoking status: Never    Smokeless tobacco: Never   Substance and Sexual Activity    Alcohol use: No    Drug use: No           PHYSICAL EXAM    (up to 7 for level 4, 8 or more for level 5)     ED Triage Vitals   BP Systolic BP Percentile Diastolic BP Percentile Temp Temp src Pulse Resp SpO2   -- -- -- -- -- -- -- --      Height Weight         -- --             There is no height or weight on file to calculate BMI.    Physical Exam  Vitals and nursing note reviewed.   Constitutional:       General: She is not in acute distress.     Appearance: Normal appearance. She is not ill-appearing.   HENT:      Head: Normocephalic and atraumatic.      Nose: Nose normal.      Mouth/Throat:      Mouth: Mucous membranes are moist.   Eyes:      Extraocular Movements: Extraocular movements intact.      Pupils: Pupils are equal, round, and reactive to light.   Cardiovascular:      Rate and Rhythm: Normal rate and regular rhythm.      Pulses: Normal pulses.      Heart sounds: Normal heart sounds. No murmur heard.     Comments: Equal radial, dp, pt pulses  Pulmonary:      Effort: Pulmonary effort is normal. No respiratory distress.      Breath sounds: Normal breath sounds.   Abdominal:       General: There is no distension.      Palpations: Abdomen is soft.      Tenderness: There is no abdominal tenderness.   Musculoskeletal:         General: Normal range of motion.      Cervical back: Normal range of motion and neck supple.      Right lower leg: No edema.      Left lower leg: No edema.   Skin:     General: Skin is warm and dry.      Capillary Refill: Capillary refill takes less than 2 seconds.   Neurological:      General: No focal deficit present.      Mental Status: She is alert and oriented to person, place, and time.      Comments: No facial asymmetry  Mild dysarthria but no expressive aphasia  Equal strength/ to bilateral upper extremities, equal strength bilateral lower extremities although both legs seem weak.  Sensation intact throughout bilateral upper and lower extremities.  There is ataxia with finger-nose-finger bilaterally   Psychiatric:         Mood and Affect: Mood normal.         Behavior: Behavior normal.         DIAGNOSTIC RESULTS     EKG: All EKG's are interpreted by the Emergency Department Physician who either signs or Co-signs this chart in the absence of a cardiologist.        RADIOLOGY:   Interpretation per the Radiologist below, if available at the time of this note:    CT HEAD WO CONTRAST    (Results Pending)   CTA HEAD NECK W CONTRAST    (Results Pending)        LABS:  Labs Reviewed   CBC WITH AUTO DIFFERENTIAL   COMPREHENSIVE METABOLIC PANEL   PROTIME-INR   CARBAMAZEPINE LEVEL, TOTAL   LAMOTRIGINE LEVEL   LEVETIRACETAM LEVEL   POCT GLUCOSE       All other labs were within normal range or not returned as of this dictation.          COURSE/REASSESSMENT            CONSULTS:  None    PROCEDURES:  Unless otherwise noted below, none     Procedures      DIFFERENTIAL DIAGNOSIS/MDM:   Medical Decision Making  59-year-old female history of seizures presenting today secondary to ataxia and trouble with speech.  Symptoms have been progressing over 3 days therefore code stroke not

## 2025-04-09 NOTE — ED NOTES
Bedside and Verbal shift change report given to Jesus PUCKETT (oncoming nurse) by Carlo PUCKETT (offgoing nurse). Report included the following information Index, MAR, Recent Results, and Neuro Assessment.

## 2025-04-10 LAB
ANION GAP SERPL CALC-SCNC: 7 MMOL/L (ref 2–12)
BASOPHILS # BLD: 0.03 K/UL (ref 0–0.1)
BASOPHILS NFR BLD: 0.4 % (ref 0–1)
BUN SERPL-MCNC: 7 MG/DL (ref 6–20)
BUN/CREAT SERPL: 8 (ref 12–20)
CALCIUM SERPL-MCNC: 10 MG/DL (ref 8.5–10.1)
CARBAMAZEPINE SERPL-MCNC: 13.8 UG/ML (ref 4–12)
CHLORIDE SERPL-SCNC: 105 MMOL/L (ref 97–108)
CHOLEST SERPL-MCNC: 229 MG/DL
CO2 SERPL-SCNC: 29 MMOL/L (ref 21–32)
CREAT SERPL-MCNC: 0.84 MG/DL (ref 0.55–1.02)
DIFFERENTIAL METHOD BLD: ABNORMAL
EOSINOPHIL # BLD: 0.08 K/UL (ref 0–0.4)
EOSINOPHIL NFR BLD: 1 % (ref 0–7)
ERYTHROCYTE [DISTWIDTH] IN BLOOD BY AUTOMATED COUNT: 12.2 % (ref 11.5–14.5)
EST. AVERAGE GLUCOSE BLD GHB EST-MCNC: 94 MG/DL
GLUCOSE SERPL-MCNC: 86 MG/DL (ref 65–100)
HBA1C MFR BLD: 4.9 % (ref 4–5.6)
HCT VFR BLD AUTO: 36.6 % (ref 35–47)
HDLC SERPL-MCNC: 63 MG/DL
HDLC SERPL: 3.6 (ref 0–5)
HGB BLD-MCNC: 12.3 G/DL (ref 11.5–16)
IMM GRANULOCYTES # BLD AUTO: 0.03 K/UL (ref 0–0.04)
IMM GRANULOCYTES NFR BLD AUTO: 0.4 % (ref 0–0.5)
INR PPP: 1.1 (ref 0.9–1.1)
LDLC SERPL CALC-MCNC: 135.4 MG/DL (ref 0–100)
LYMPHOCYTES # BLD: 2.85 K/UL (ref 0.8–3.5)
LYMPHOCYTES NFR BLD: 35 % (ref 12–49)
MCH RBC QN AUTO: 30.9 PG (ref 26–34)
MCHC RBC AUTO-ENTMCNC: 33.6 G/DL (ref 30–36.5)
MCV RBC AUTO: 92 FL (ref 80–99)
MONOCYTES # BLD: 0.37 K/UL (ref 0–1)
MONOCYTES NFR BLD: 4.5 % (ref 5–13)
NEUTS SEG # BLD: 4.78 K/UL (ref 1.8–8)
NEUTS SEG NFR BLD: 58.7 % (ref 32–75)
NRBC # BLD: 0 K/UL (ref 0–0.01)
NRBC BLD-RTO: 0 PER 100 WBC
PLATELET # BLD AUTO: 204 K/UL (ref 150–400)
PMV BLD AUTO: 9.5 FL (ref 8.9–12.9)
POTASSIUM SERPL-SCNC: 3.7 MMOL/L (ref 3.5–5.1)
PROTHROMBIN TIME: 11.2 SEC (ref 9.2–11.2)
RBC # BLD AUTO: 3.98 M/UL (ref 3.8–5.2)
SODIUM SERPL-SCNC: 141 MMOL/L (ref 136–145)
TRIGL SERPL-MCNC: 153 MG/DL
TSH SERPL DL<=0.05 MIU/L-ACNC: 1.65 UIU/ML (ref 0.36–3.74)
VLDLC SERPL CALC-MCNC: 30.6 MG/DL
WBC # BLD AUTO: 8.1 K/UL (ref 3.6–11)

## 2025-04-10 PROCEDURE — 99233 SBSQ HOSP IP/OBS HIGH 50: CPT | Performed by: FAMILY MEDICINE

## 2025-04-10 PROCEDURE — 94761 N-INVAS EAR/PLS OXIMETRY MLT: CPT

## 2025-04-10 PROCEDURE — 97165 OT EVAL LOW COMPLEX 30 MIN: CPT

## 2025-04-10 PROCEDURE — 92522 EVALUATE SPEECH PRODUCTION: CPT | Performed by: SPEECH-LANGUAGE PATHOLOGIST

## 2025-04-10 PROCEDURE — 95816 EEG AWAKE AND DROWSY: CPT | Performed by: PSYCHIATRY & NEUROLOGY

## 2025-04-10 PROCEDURE — 97116 GAIT TRAINING THERAPY: CPT

## 2025-04-10 PROCEDURE — 80177 DRUG SCRN QUAN LEVETIRACETAM: CPT

## 2025-04-10 PROCEDURE — 2500000003 HC RX 250 WO HCPCS: Performed by: FAMILY MEDICINE

## 2025-04-10 PROCEDURE — 83036 HEMOGLOBIN GLYCOSYLATED A1C: CPT

## 2025-04-10 PROCEDURE — 80156 ASSAY CARBAMAZEPINE TOTAL: CPT

## 2025-04-10 PROCEDURE — 95816 EEG AWAKE AND DROWSY: CPT

## 2025-04-10 PROCEDURE — 85025 COMPLETE CBC W/AUTO DIFF WBC: CPT

## 2025-04-10 PROCEDURE — 97163 PT EVAL HIGH COMPLEX 45 MIN: CPT

## 2025-04-10 PROCEDURE — G0378 HOSPITAL OBSERVATION PER HR: HCPCS

## 2025-04-10 PROCEDURE — 85610 PROTHROMBIN TIME: CPT

## 2025-04-10 PROCEDURE — 80175 DRUG SCREEN QUAN LAMOTRIGINE: CPT

## 2025-04-10 PROCEDURE — 96372 THER/PROPH/DIAG INJ SC/IM: CPT

## 2025-04-10 PROCEDURE — 6370000000 HC RX 637 (ALT 250 FOR IP): Performed by: FAMILY MEDICINE

## 2025-04-10 PROCEDURE — 97112 NEUROMUSCULAR REEDUCATION: CPT

## 2025-04-10 PROCEDURE — 95957 EEG DIGITAL ANALYSIS: CPT

## 2025-04-10 PROCEDURE — 80048 BASIC METABOLIC PNL TOTAL CA: CPT

## 2025-04-10 PROCEDURE — 6370000000 HC RX 637 (ALT 250 FOR IP): Performed by: NURSE PRACTITIONER

## 2025-04-10 PROCEDURE — 80061 LIPID PANEL: CPT

## 2025-04-10 PROCEDURE — 84443 ASSAY THYROID STIM HORMONE: CPT

## 2025-04-10 PROCEDURE — 99223 1ST HOSP IP/OBS HIGH 75: CPT | Performed by: NURSE PRACTITIONER

## 2025-04-10 PROCEDURE — 6360000002 HC RX W HCPCS: Performed by: FAMILY MEDICINE

## 2025-04-10 RX ORDER — ACETAMINOPHEN 325 MG/1
650 TABLET ORAL EVERY 4 HOURS PRN
Status: DISCONTINUED | OUTPATIENT
Start: 2025-04-10 | End: 2025-04-11 | Stop reason: HOSPADM

## 2025-04-10 RX ADMIN — SODIUM CHLORIDE, PRESERVATIVE FREE 10 ML: 5 INJECTION INTRAVENOUS at 21:02

## 2025-04-10 RX ADMIN — CARBAMAZEPINE 300 MG: 300 CAPSULE, EXTENDED RELEASE ORAL at 10:41

## 2025-04-10 RX ADMIN — ENOXAPARIN SODIUM 40 MG: 100 INJECTION SUBCUTANEOUS at 21:01

## 2025-04-10 RX ADMIN — SODIUM CHLORIDE, PRESERVATIVE FREE 10 ML: 5 INJECTION INTRAVENOUS at 10:43

## 2025-04-10 RX ADMIN — LAMOTRIGINE 200 MG: 100 TABLET ORAL at 10:42

## 2025-04-10 RX ADMIN — LEVETIRACETAM 750 MG: 500 TABLET, FILM COATED ORAL at 21:02

## 2025-04-10 RX ADMIN — LEVETIRACETAM 750 MG: 500 TABLET, FILM COATED ORAL at 10:41

## 2025-04-10 RX ADMIN — ACETAMINOPHEN 650 MG: 325 TABLET ORAL at 21:00

## 2025-04-10 RX ADMIN — ROSUVASTATIN CALCIUM 40 MG: 10 TABLET, FILM COATED ORAL at 21:01

## 2025-04-10 RX ADMIN — CLONAZEPAM 0.5 MG: 0.5 TABLET ORAL at 21:01

## 2025-04-10 RX ADMIN — LAMOTRIGINE 200 MG: 100 TABLET ORAL at 21:01

## 2025-04-10 RX ADMIN — ACETAMINOPHEN 650 MG: 325 TABLET ORAL at 04:21

## 2025-04-10 RX ADMIN — CARBAMAZEPINE 300 MG: 300 CAPSULE, EXTENDED RELEASE ORAL at 21:01

## 2025-04-10 RX ADMIN — OXYCODONE HYDROCHLORIDE AND ACETAMINOPHEN 250 MG: 500 TABLET ORAL at 10:42

## 2025-04-10 ASSESSMENT — PAIN SCALES - GENERAL
PAINLEVEL_OUTOF10: 0
PAINLEVEL_OUTOF10: 2
PAINLEVEL_OUTOF10: 0
PAINLEVEL_OUTOF10: 6
PAINLEVEL_OUTOF10: 3

## 2025-04-10 ASSESSMENT — PAIN DESCRIPTION - DESCRIPTORS
DESCRIPTORS: ACHING
DESCRIPTORS: ACHING

## 2025-04-10 ASSESSMENT — PAIN DESCRIPTION - ORIENTATION: ORIENTATION: RIGHT;LEFT;MID

## 2025-04-10 ASSESSMENT — PAIN - FUNCTIONAL ASSESSMENT: PAIN_FUNCTIONAL_ASSESSMENT: PREVENTS OR INTERFERES SOME ACTIVE ACTIVITIES AND ADLS

## 2025-04-10 ASSESSMENT — PAIN DESCRIPTION - LOCATION
LOCATION: HEAD
LOCATION: HEAD

## 2025-04-10 NOTE — PLAN OF CARE
Problem: Occupational Therapy - Adult  Goal: By Discharge: Performs self-care activities at highest level of function for planned discharge setting.  See evaluation for individualized goals.  Description: FUNCTIONAL STATUS PRIOR TO ADMISSION:     ,  ,  ,  ,  ,  ,  ,  ,  ,  ,       HOME SUPPORT: Patient lived with son and DIL and grandchildren but didn't require assistance. Was independent with mobility and ADLs and only relied on family for transportation d/t hx of seizures    Occupational Therapy Goals:  Initiated 4/10/2025  1.  Patient will perform grooming in standing with Contact Guard Assist within 7 day(s).  2.  Patient will perform upper body dressing with Supervision within 7 day(s).  3.  Patient will perform lower body dressing with Minimal Assist within 7 day(s).  4.  Patient will perform toilet transfers with Contact Guard Assist  within 7 day(s).  5.  Patient will perform all aspects of toileting with Minimal Assist within 7 day(s).  6.  Patient will participate in upper extremity therapeutic exercise/activities with Stand by Assist for 10 minutes within 7 day(s).    7.  Patient will utilize energy conservation techniques during functional activities with verbal and visual cues within 7 day(s).    Outcome: Progressing   OCCUPATIONAL THERAPY EVALUATION    Patient: Brandy Dennison (59 y.o. female)  Date: 4/10/2025  Primary Diagnosis: Dysarthria [R47.1]  Cerebrovascular accident (CVA), unspecified mechanism (HCC) [I63.9]         Precautions:                    ASSESSMENT :  The patient is limited by decreased functional mobility, independence in ADLs, high-level IADLs, safety awareness, coordination, balance, LE and truncal ataxia, posture in setting of admission for dysarthria and ataxia. Patient with no acute abnormality on MRI or CTH. Pt with hx of partial complex seizure disorder and is followed at [facility]. Pt reports to this writer she typically has 2-3 absence seizures/daily. She is grossely ataxia  Cognition  Education Outcome: Verbalized understanding;Demonstrated understanding;Continued education needed    Thank you for this referral.  Clare Patel OT  Minutes: 30    Occupational Therapy Evaluation Charge Determination   History Examination Decision-Making   LOW Complexity : Brief history review  LOW Complexity: 1-3 Performance deficits relating to physical, cognitive, or psychosocial skills that result in activity limitations and/or participation restrictions LOW Complexity: No comorbidities that affect functional and  no verbal  or physical assist needed to complete eval tasks   Based on the above components, the patient evaluation is determined to be of the following complexity level: Low

## 2025-04-10 NOTE — CONSULTS
[facility]: [facility]    Toña Godinez, KAILEYA, CNRN, ACNP-BC  [facility]  [address]  [phone]        Name:   Brandy Dennison   Medical record #: [id number]  Admission Date: 4/9/2025       Consult requested by: David Winchester MD     Reason for Consult:  Dysarthria, ataxia      HISTORY OF PRESENT ILLNESS:     Brandy Dennison is a 59 y.o. female who presented to the ED on 4/9/2025 with a several day history of progressive slurred speech and ataxia.  She reported that she was seen at  2 days ago where they did a CT and told her that they would follow-up on her AED levels.  Upon arrival to the [facility] ED she was found to have mild dysarthria, ataxia with finger-nose-finger and bilateral lower extremity weakness with a presenting blood pressure of 167/85, afebrile, GCS of 15.  Review of presenting labs shows a sodium of 131, creatinine 0.96, glucose of 91, elevated alk phos, normal CBC and urine with 1+ bacteria and trace leukocytes.  At the time of interview, her daughter via FaceTime tells me that her mother is back to her baseline.  The Neurology Service is asked to evaluate for stroke.    Patient is followed by [facility] and was last seen on 6/8/2023 by Dr. Berry for complex partial seizures.  At that time she reported having 2-3 seizures a day and that she was having blurred vision and balance issues from her carbamazepine.  At that time he continued her Keppra 750 mg twice daily, carbamazepine 300 mg twice daily, Lamictal 200 mg twice daily and clonazepam 0.5 mg 3 times daily.  Her normal seizure semiology is staring into space for up to a minute with loss of consciousness but no abnormal movements movements.        Neuro-imaging:     CT Head: No acute process    CTA Head and Neck: No evidence of LVO or carotid stenosis    EKG: normal sinus rhythm.    Plan of care discussed with:  Patient and Family at bedside      Impression/ Plan:

## 2025-04-10 NOTE — PLAN OF CARE
Problem: Physical Therapy - Adult  Goal: By Discharge: Performs mobility at highest level of function for planned discharge setting.  See evaluation for individualized goals.  Description: FUNCTIONAL STATUS PRIOR TO ADMISSION: Patient was modified independent using a rollator for functional mobility.    HOME SUPPORT PRIOR TO ADMISSION: The patient lived with son but did not require assistance.  Patient within the last week with increased balance deficits and requires external assistance from family    Physical Therapy Goals  Initiated 4/10/2025  1.  Patient will move from supine to sit and sit to supine in bed with contact guard assist within 7 day(s).    2.  Patient will perform sit to stand with contact guard assist within 7 day(s).  3.  Patient will transfer from bed to chair and chair to bed with contact guard assist using the least restrictive device within 7 day(s).  4.  Patient will ambulate with contact guard assist for 50 feet with the least restrictive device within 7 day(s).   5.  Patient will ascend/descend 4 stairs with 2 handrail(s) with minimal assistance within 7 day(s).   Outcome: Progressing  PHYSICAL THERAPY EVALUATION    Patient: Brandy Dennison (59 y.o. female)  Date: 4/10/2025  Primary Diagnosis: Dysarthria [R47.1]  Cerebrovascular accident (CVA), unspecified mechanism (HCC) [I63.9]       Precautions:              ASSESSMENT :   DEFICITS/IMPAIRMENTS:   The patient is limited by decreased functional mobility, ROM, strength, cognition, coordination, balance   Following admission for weakness and possible seizure.  Patient has a history of seizures.  Patient underwent CT and MRI which was negative for an acute infarct.    Based on the impairments listed above patient today was Min-MOD A with all upright activity and demonstrates increased UE and LE ataxia and increased trunk sway and instability.  Patient with decreased insight into deficits and requires increased cuing for sequencing and  assistance to keep from falling/unable to try  13. Standing Unsupported One Foot in Front: Loses balance while stepping or standing  14. Standing on One Leg: Unable to try needs assist to prevent fall  Stevens Balance Score: 4         56=Maximum possible score;   0-20=High fall risk  21-40=Moderate fall risk   41-56=Low fall risk                                                                                                                                                                                                                               Pain Rating:  None noted    Activity Tolerance:   Fair  and Poor    After treatment:   Patient left in no apparent distress in bed, Call bell within reach, and Bed/ chair alarm activated    COMMUNICATION/EDUCATION:   The patient's plan of care was discussed with: occupational therapist and registered nurse    Patient Education  Education Given To: Patient  Education Provided: Plan of Care  Education Method: Verbal;Demonstration  Barriers to Learning: None  Education Outcome: Verbalized understanding    Thank you for this referral.  Cindy Garcia PT,DPT,NCS,CLT  Minutes: 26      Physical Therapy Evaluation Charge Determination   History Examination Presentation Decision-Making   HIGH Complexity :3+ comorbidities / personal factors will impact the outcome/ POC  HIGH Complexity : 4+ Standardized tests and measures addressing body structure, function, activity limitation and / or participation in recreation  HIGH Complexity : Unstable and unpredictable characteristics  Stevens Balance Test  HIGH    Based on the above components, the patient evaluation is determined to be of the following complexity level: High

## 2025-04-10 NOTE — DISCHARGE INSTRUCTIONS
HOME DISCHARGE INSTRUCTIONS    Brandy Dennison / [id number] : 1965    Admission date: 2025 Discharge date: 2025     Please bring this form with you to show your care provider at your follow-up appointment.    Primary care provider:  Antoinette Weinstein MD    Discharging provider:  Sandy Antonio MD  - Family Medicine Resident  David Winchester MD - Family Medicine Attending      You have been admitted to the hospital with the following diagnoses:    ACUTE DIAGNOSES:  Dysarthria [R47.1]  Cerebrovascular accident (CVA), unspecified mechanism (HCC) [I63.9]  You were admitted to the hospital and evaluated by neurology. You are to follow up with neurology and ophthalmology outpatient.  You were recommended to attend rehab but preferred to be discharged home with family help.   . . . . . . . . . . . . . . . . . . . . . . . . . . . . . . . . . . . . . . . . . . . . . . . . . . . . . . . . . . . . . . . . . . . . . . . .   FOLLOW-UP CARE RECOMMENDATIONS:  Follow up with neurology and opthalmology   START  Crestor 40mg daily   CONTINUE   Your regimen of Keppra 750mg twice daily, carbamazepine 300 twice daily, lamictal 200 twice daily         Appointments  No future appointments.    Follow-up tests needed: none    Pending test results:   At the time of your discharge the following test results are still pending: ***.   Please make sure you review these results with your outpatient follow-up provider(s).    DIET/what to eat:  regular diet    ACTIVITY:  activity as tolerated    Wound care: none    Equipment needed:  none    Specific symptoms to watch for: chest pain, shortness of breath, fever, chills, nausea, vomiting, diarrhea, change in mentation, falling, weakness, bleeding.     What to do if new or unexpected symptoms occur?    If you experience any of the above symptoms (or should other concerns or questions arise after discharge) please call your primary care physician. Return to the

## 2025-04-10 NOTE — PROCEDURES
Routine EEG      [place], [place]        [phone] (Main)  [phone] (Medical Records)     Routine EEG (16-channel)    Date of Study:   4-9-25  Date of Interpretation: 4/10/25    Indication:    59 y.o. female  Dysarthria [R47.1]  Cerebrovascular accident (CVA), unspecified mechanism (HCC) [I63.9]    PSHx lists Cranial Surgery: No  Past Surgical History:   Procedure Laterality Date    ORTHOPEDIC SURGERY  2014    right knee       Impression:  Normal awake EEG recording.  No epileptiform discharges were seen during this recording.  Clinical and Neuro-Imaging correlation is necessary    =================================  Technical: 16-channel, multiple montages, digital EEG, 10-20 international placement system format.   Simultaneous video recording is performed.  The technical quality of the study was adequate.  Single lead EKG was recorded.     Interpretation:      At the beginning of the recording, the patient is described as: awake, answering questions correctly    With eyes closed, the background is: symmetric   The PDR is 8 Hz on both sides      Photic stimulation: No driving response seen on either side    Hyperventilation and Post-HV: not performed    Is Drowsiness recorded: no   Is Sleep recorded: no    Areas of focal slowing: none   Epileptiform discharges: none    Single lead EKG: sinus rhythm    =================================    Home Meds    Medications Prior to Admission: carBAMazepine (TEGRETOL) 200 MG tablet, Take 1.5 tablets by mouth in the morning and 1.5 tablets in the evening.  CVS VITAMIN C 250 MG tablet, TAKE 1 TABLET BY MOUTH EVERY DAY  VITAMIN D3 25 MCG (1000 UT) TABS tablet, TAKE 1 TABLET BY MOUTH EVERY DAY  lamoTRIgine (LAMICTAL) 200 MG tablet, Take 1 tablet by mouth 2 times daily  levETIRAcetam (KEPPRA) 750 MG tablet, Take 1 tablet by mouth in the morning and 1 tablet in the evening.  clonazePAM (KLONOPIN) 0.5 MG tablet, Take 1 tablet by mouth 3 times

## 2025-04-10 NOTE — ED NOTES
TRANSFER - OUT REPORT:    Verbal report given to [place] PUCKETT on Brandy Dennison  being transferred to [facility] for routine progression of patient care       Report consisted of patient's Situation, Background, Assessment and   Recommendations(SBAR).     Information from the following report(s) Nurse Handoff Report, ED Encounter Summary, ED SBAR, Adult Overview, MAR, and Recent Results was reviewed with the receiving nurse.    Norris Fall Assessment:    Presents to emergency department  because of falls (Syncope, seizure, or loss of consciousness): No  Age > 70: No  Altered Mental Status, Intoxication with alcohol or substance confusion (Disorientation, impaired judgment, poor safety awaremess, or inability to follow instructions): No  Impaired Mobility: Ambulates or transfers with assistive devices or assistance; Unable to ambulate or transer.: Yes  Nursing Judgement: Yes          Lines:       Opportunity for questions and clarification was provided.      Patient transported with:  Monitor and Tech

## 2025-04-10 NOTE — H&P
[facility]  [address] [place], [place]  [id number] [phone]    Hospital Medicine History and Physical      NAME:       Brandy Dennison   :       1965   MRN:      [id number]     Date of service:   2025     Chief  Complaint:  Slurred speech     History Of Presenting Illness:       Ms. Dennison is a 59 y.o. female who is being admitted for Dysarthria. Ms. Dennison presented to our Emergency Department today complaining of  slurred speech.  Patient has a history of seizure disorder, followed by the [facility] at [facility].  Compliant with all medications.  States that starting approximately 2 days ago, she started feeling dizzy and unsteady on her feet.  This symptom has persisted.  Then, this morning, was noted to have slurred speech that has also been persistent.  Denies any numbness/weakness/tingling in extremities, facial droop or other abnormalities.    In the emergency department, basic labs including CBC and BMP were unremarkable.  CT head was negative for acute findings.  CTA head and neck were also unremarkable.  Due to persistent symptoms of dysarthria and ataxia, however, she will be observed overnight for MRI and neurological evaluation.    Allergies   Allergen Reactions    Aspirin     Codeine     Phenobarbital        Prior to Admission medications    Medication Sig Start Date End Date Taking? Authorizing Provider   carBAMazepine (TEGRETOL) 200 MG tablet Take 1.5 tablets by mouth in the morning and 1.5 tablets in the evening.   Yes Provider, MD [place]   [facility]S VITAMIN C 250 MG tablet TAKE 1 TABLET BY MOUTH EVERY DAY 25  Yes Antoinette Weinstein APRN - NP   VITAMIN D3 25 MCG (1000 UT) TABS tablet TAKE 1 TABLET BY MOUTH EVERY DAY 3/5/24  Yes Antoinette Weinstein APRN - NP   lamoTRIgine (LAMICTAL) 200 MG tablet Take 1 tablet by mouth 2 times  decision maker: Family        Care Plan discussed with: Patient, Family, Nursing Staff and ED physician    Prophylaxis:  Lovenox    Probable Disposition:  Home w/Family    PCP:      Antoinette Weinstein APRN - NP          ___________________________________________________    Attending Physician: Santosh Franks MD

## 2025-04-10 NOTE — PROGRESS NOTES
Speech LAnguage Pathology EVALUATION/DISCHARGE    Patient: Brandy Dennison (59 y.o. female)  Date: 4/10/2025  Primary Diagnosis: Dysarthria [R47.1]  Cerebrovascular accident (CVA), unspecified mechanism (HCC) [I63.9]       Precautions:                     ASSESSMENT :  Patient with intact motor speech. She is oriented and able to communicate at the conversational level without distortion. She denies difficulty swallowing. Note MRI negative for acute infarct.     Patient will be discharged from skilled speech-language pathology services at this time.     PLAN :  Recommendations and Planned Interventions:  Diet: Regular and thin liquids          Acute SLP Services: No, patient will be discharged from acute skilled speech-language pathology at this time.  Discharge Recommendations: No, additional SLP treatment not indicated at discharge     SUBJECTIVE:   Patient stated, “I had a hard time getting my words to come out right.”    OBJECTIVE:     Past Medical History:   Diagnosis Date    Degenerative disc disease, cervical     C2 C3    Headache     Ill-defined condition     anxiety    Seizures (HCC)     daily petite mal seizure    UTI (urinary tract infection) 05/22/2017     Past Surgical History:   Procedure Laterality Date    ORTHOPEDIC SURGERY  2014    right knee     Prior Level of Function/Home Situation:   Social/Functional History  Lives With: Son, Family  Type of Home: Apartment  Home Layout: One level  Home Access: Stairs to enter with rails  Entrance Stairs - Number of Steps: 12  Entrance Stairs - Rails: Both  Has the patient had two or more falls in the past year or any fall with injury in the past year?: Yes    Baseline Assessment:                Cognitive and Communication Status:  Neurologic State: Alert  Orientation Level: Oriented x4  Cognition: Follows commands    Dysphagia:  Oral Assessment:   Symmetric, adequate ROM. Intact dentition, clean      Motor Speech:   100% intelligible. No distortion for

## 2025-04-10 NOTE — PROGRESS NOTES
[address] [id number]   Office [phone]  Fax [phone]          Subjective / Objective     Subjective  Overnight Events: DANELLE.   Patient seen and examined at bedside. Reports slurred speech is improved today. Stated when it started 3 days ago, \"words wouldn't come out the way I wanted\". Generally weak this AM. Also states her vision is blurry at times in both eyes, states this has been ongoing for years. Has reading glasses, does not wear them. Reports daily having episodes of staring off into space and feeling off which resolve in a few seconds and occur 1-2 times which are typical presentation for her seizures, no change from prior. Denies headache, CP, SOB, N/V, dysuria, paraesthesia, focal weakness, sensory changes.     Respiratory:   RA  Vitals:    04/10/25 0313   BP: 130/70   Pulse: 71   Resp: 14   Temp: 97.5 °F (36.4 °C)   SpO2: 96%     Physical Examination:   General appearance - alert and in no distress  HEENT: PERRL, clear conjunctiva, moist mucus membranes.  Chest - clear to auscultation, no wheezes, rales or rhonchi, symmetric air entry  Heart - normal rate, regular rhythm, normal S1, S2, no murmurs, rubs, clicks or gallops  Abdomen - soft, nontender, nondistended  Neurological - alert, oriented x4, no dysarthria, speech slowed, no focal findings. Strength 5/5 in upper extremities, 4/5 equal in bilateral lower extremities. Sensation normal throughout. Coordination normal.   Skin - Warm, dry. No notable rashes  Extremities - peripheral pulses normal, no pedal edema, no clubbing or cyanosis    I/O:  04/09 0701 - 04/10 0700  In: 250 [P.O.:250]  Out: -   Inpatient Medications  Current Facility-Administered Medications   Medication Dose Route Frequency    acetaminophen (TYLENOL) tablet 650 mg  650 mg Oral Q4H PRN    carBAMazepine (CARBATROL) extended release capsule 300 mg  300 mg Oral Q12H    clonazePAM (KLONOPIN) tablet 0.5 mg  0.5 mg Oral TID PRN    ascorbic acid (VITAMIN

## 2025-04-10 NOTE — CARE COORDINATION
Care Management Initial Assessment  4/10/2025 9:19 AM  If patient is discharged prior to next notation, then this note serves as note for discharge by case management.    Reason for Admission:   Dysarthria [R47.1]  Cerebrovascular accident (CVA), unspecified mechanism (HCC) [I63.9]         Patient Admission Status: Observation  Date Admitted to INP: na  RUR: No data recorded  Hospitalization in the last 30 days (Readmission):  No        Advance Care Planning:  Code Status: Full Code  Primary Healthcare Decision Maker:     Advance Directive: has NO advanced directive - not interested in additional information     __________________________________________________________________________  Assessment:      04/10/25 0918   Service Assessment   Patient Orientation Alert and Oriented   Cognition Alert   History Provided By Medical Record   Support Systems Children   Prior Functional Level Independent in ADLs/IADLs   Discharge Planning   Patient expects to be discharged to: House   Services At/After Discharge   Mode of Transport at Discharge Self   Confirm Follow Up Transport Self     Comments: Patient in Obs. No anticipated needs, no CM consult at this time. Please consult CM should any needs arise.     Discharge Concerns: []Yes [x]No []Unknown   Describe:    Financial concerns/barriers: []Yes, explain: []No [x]Unknown/Not discussed  __________________________________________________________________________    Insurer:   Active Insurance as of 4/9/2025       Primary Coverage       Payor Plan Insurance Group Employer/Plan Group    [place] [id number]       Payor Plan Address Payor Plan Phone Number Payor Plan Fax Number Effective Dates    [address] [address]   11/1/2023 - None Entered    [facility] [id number]         Subscriber Name Subscriber Birth Date Member ID       CHERELLE STODDARD SABA 1965 [id number]                     PCP: Antoinette Weinstein APRN - NP   Address: [address]  [address] [address]   Phone number: [phone]    Pharmacy:   [id number] - [place], [place] - [address] - P [phone] - F [phone]  [address]  [facility] [address]  Phone: [phone] Fax: [phone]    [id number] - [place], [place] - [address] - P [phone] - F [phone]  [address] [facility] [address]  Phone: [phone] Fax: [phone]    DC Transport: (P) Self       Transition of care plan:    []Unable to determine at this time. Awaiting clinical progress, and disposition recommendations.    [] Home. No assistance required.     [] Home. Pt refused recommended services.    [x] Home with family assistance as needed, and outpatient follow-up.    [] Home with Outpatient PT and outpatient follow-up   Pt aware of OP appt? []Yes, Provider:   []Not scheduled   Transport provider:     [] Home with outpatient services.    Specify:    [] Home with Home Health   - [place] of Choice offered? [] Yes, Preference:   [] NA    []SNF/IPR   -[]Freedom of Choice offered, and preferences given:   []Listing provided and preferences requested   -Status: []Pending []Accepted:    -Auth required: []Yes []No    -Auth initiated date:   -3 midnight stay required: []Yes []No  Date satisfied:     [] LTC:     [] Home with Hospice   - [place] of Choice offered? [] Yes, Preference:   [] NA    [] [facility] information provided.     [] Other:       Susan Hernández RN  Case Management Department  For questions or concerns, please PerfectServe

## 2025-04-11 VITALS
DIASTOLIC BLOOD PRESSURE: 86 MMHG | HEIGHT: 63 IN | TEMPERATURE: 97.2 F | BODY MASS INDEX: 37.5 KG/M2 | SYSTOLIC BLOOD PRESSURE: 142 MMHG | OXYGEN SATURATION: 97 % | WEIGHT: 211.64 LBS | HEART RATE: 76 BPM | RESPIRATION RATE: 16 BRPM

## 2025-04-11 PROBLEM — R27.0 ATAXIA: Status: ACTIVE | Noted: 2025-04-11

## 2025-04-11 LAB
ANION GAP SERPL CALC-SCNC: 5 MMOL/L (ref 2–12)
BASOPHILS # BLD: 0.02 K/UL (ref 0–0.1)
BASOPHILS NFR BLD: 0.3 % (ref 0–1)
BUN SERPL-MCNC: 8 MG/DL (ref 6–20)
BUN/CREAT SERPL: 9 (ref 12–20)
CALCIUM SERPL-MCNC: 10.4 MG/DL (ref 8.5–10.1)
CHLORIDE SERPL-SCNC: 106 MMOL/L (ref 97–108)
CO2 SERPL-SCNC: 29 MMOL/L (ref 21–32)
CREAT SERPL-MCNC: 0.88 MG/DL (ref 0.55–1.02)
DIFFERENTIAL METHOD BLD: NORMAL
EOSINOPHIL # BLD: 0.08 K/UL (ref 0–0.4)
EOSINOPHIL NFR BLD: 1 % (ref 0–7)
ERYTHROCYTE [DISTWIDTH] IN BLOOD BY AUTOMATED COUNT: 12.1 % (ref 11.5–14.5)
GLUCOSE SERPL-MCNC: 79 MG/DL (ref 65–100)
HCT VFR BLD AUTO: 35.3 % (ref 35–47)
HGB BLD-MCNC: 12 G/DL (ref 11.5–16)
IMM GRANULOCYTES # BLD AUTO: 0.04 K/UL (ref 0–0.04)
IMM GRANULOCYTES NFR BLD AUTO: 0.5 % (ref 0–0.5)
LAMOTRIGINE SERPL-MCNC: 18.8 UG/ML (ref 2–20)
LAMOTRIGINE SERPL-MCNC: 19.5 UG/ML (ref 2–20)
LEVETIRACETAM SERPL-MCNC: 26.9 UG/ML (ref 10–40)
LEVETIRACETAM SERPL-MCNC: 27.4 UG/ML (ref 10–40)
LYMPHOCYTES # BLD: 3.33 K/UL (ref 0.8–3.5)
LYMPHOCYTES NFR BLD: 41.6 % (ref 12–49)
MCH RBC QN AUTO: 31.2 PG (ref 26–34)
MCHC RBC AUTO-ENTMCNC: 34 G/DL (ref 30–36.5)
MCV RBC AUTO: 91.7 FL (ref 80–99)
MONOCYTES # BLD: 0.43 K/UL (ref 0–1)
MONOCYTES NFR BLD: 5.4 % (ref 5–13)
NEUTS SEG # BLD: 4.1 K/UL (ref 1.8–8)
NEUTS SEG NFR BLD: 51.2 % (ref 32–75)
NRBC # BLD: 0 K/UL (ref 0–0.01)
NRBC BLD-RTO: 0 PER 100 WBC
PLATELET # BLD AUTO: 199 K/UL (ref 150–400)
PMV BLD AUTO: 9.8 FL (ref 8.9–12.9)
POTASSIUM SERPL-SCNC: 4.7 MMOL/L (ref 3.5–5.1)
RBC # BLD AUTO: 3.85 M/UL (ref 3.8–5.2)
SODIUM SERPL-SCNC: 140 MMOL/L (ref 136–145)
WBC # BLD AUTO: 8 K/UL (ref 3.6–11)

## 2025-04-11 PROCEDURE — 99238 HOSP IP/OBS DSCHRG MGMT 30/<: CPT | Performed by: STUDENT IN AN ORGANIZED HEALTH CARE EDUCATION/TRAINING PROGRAM

## 2025-04-11 PROCEDURE — 2500000003 HC RX 250 WO HCPCS: Performed by: FAMILY MEDICINE

## 2025-04-11 PROCEDURE — 96374 THER/PROPH/DIAG INJ IV PUSH: CPT

## 2025-04-11 PROCEDURE — 85025 COMPLETE CBC W/AUTO DIFF WBC: CPT

## 2025-04-11 PROCEDURE — G0378 HOSPITAL OBSERVATION PER HR: HCPCS

## 2025-04-11 PROCEDURE — 6370000000 HC RX 637 (ALT 250 FOR IP): Performed by: FAMILY MEDICINE

## 2025-04-11 PROCEDURE — 80048 BASIC METABOLIC PNL TOTAL CA: CPT

## 2025-04-11 PROCEDURE — 94761 N-INVAS EAR/PLS OXIMETRY MLT: CPT

## 2025-04-11 PROCEDURE — 6360000002 HC RX W HCPCS: Performed by: FAMILY MEDICINE

## 2025-04-11 RX ORDER — ROSUVASTATIN CALCIUM 40 MG/1
40 TABLET, COATED ORAL NIGHTLY
Qty: 30 TABLET | Refills: 0 | Status: SHIPPED | OUTPATIENT
Start: 2025-04-11

## 2025-04-11 RX ORDER — ROSUVASTATIN CALCIUM 40 MG/1
40 TABLET, COATED ORAL NIGHTLY
Qty: 30 TABLET | Refills: 0 | Status: CANCELLED | OUTPATIENT
Start: 2025-04-11

## 2025-04-11 RX ORDER — ROSUVASTATIN CALCIUM 40 MG/1
40 TABLET, COATED ORAL NIGHTLY
Qty: 30 TABLET | Refills: 0 | Status: SHIPPED | OUTPATIENT
Start: 2025-04-11 | End: 2025-04-11

## 2025-04-11 RX ORDER — ASCORBIC ACID 500 MG
250 TABLET ORAL
Status: DISCONTINUED | OUTPATIENT
Start: 2025-04-11 | End: 2025-04-11 | Stop reason: HOSPADM

## 2025-04-11 RX ADMIN — LAMOTRIGINE 200 MG: 100 TABLET ORAL at 09:30

## 2025-04-11 RX ADMIN — CLONAZEPAM 0.5 MG: 0.5 TABLET ORAL at 09:30

## 2025-04-11 RX ADMIN — LEVETIRACETAM 750 MG: 500 TABLET, FILM COATED ORAL at 09:30

## 2025-04-11 RX ADMIN — CARBAMAZEPINE 300 MG: 300 CAPSULE, EXTENDED RELEASE ORAL at 09:30

## 2025-04-11 RX ADMIN — ONDANSETRON 4 MG: 2 INJECTION, SOLUTION INTRAMUSCULAR; INTRAVENOUS at 13:35

## 2025-04-11 RX ADMIN — SODIUM CHLORIDE, PRESERVATIVE FREE 10 ML: 5 INJECTION INTRAVENOUS at 09:32

## 2025-04-11 ASSESSMENT — PAIN SCALES - GENERAL
PAINLEVEL_OUTOF10: 0
PAINLEVEL_OUTOF10: 0

## 2025-04-11 NOTE — CARE COORDINATION
Care Management Discharge Note:      04/11/25 1253   Discharge Planning   Patient expects to be discharged to: House   Services At/After Discharge   Transition of Care Consult (CM Consult) N/A   Services At/After Discharge None   Mode of Transport at Discharge Other (see comment)  (family)   Confirm Follow Up Transport Self     Patient with dc orders. Recs for HH therapy at dc. CM met with patient to discuss. Patient declined services stating she lives with family and \"does not need that\". Son will transport home at time of dc.  ______________________  Susan COFFEY, RN  Care Management  4/11/2025

## 2025-04-11 NOTE — PLAN OF CARE
Problem: Chronic Conditions and Co-morbidities  Goal: Patient's chronic conditions and co-morbidity symptoms are monitored and maintained or improved  Outcome: Progressing  Care Plan - Patient's Chronic Conditions and Co-Morbidity Symptoms are Monitored and Maintained or Improved: Monitor and assess patient's chronic conditions and comorbid symptoms for stability, deterioration, or improvement       Problem: Discharge Planning  Goal: Discharge to home or other facility with appropriate resources  Outcome: Progressing  Discharge to home or other facility with appropriate resources: Identify barriers to discharge with patient and caregiver       Problem: Skin/Tissue Integrity  Goal: Skin integrity remains intact  Description: 1.  Monitor for areas of redness and/or skin breakdown  2.  Assess vascular access sites hourly  3.  Every 4-6 hours minimum:  Change oxygen saturation probe site  4.  Every 4-6 hours:  If on nasal continuous positive airway pressure, respiratory therapy assess nares and determine need for appliance change or resting period  Outcome: Progressing  Skin Integrity Remains Intact: Monitor for areas of redness and/or skin breakdown     Problem: Safety - Adult  Goal: Free from fall injury  Outcome: Progressing    Problem: Pain  Goal: Verbalizes/displays adequate comfort level or baseline comfort level  Outcome: Progressing       Problem: Physical Therapy - Adult  Goal: By Discharge: Performs mobility at highest level of function for planned discharge setting.  See evaluation for individualized goals.  Description: FUNCTIONAL STATUS PRIOR TO ADMISSION: Patient was modified independent using a rollator for functional mobility.    HOME SUPPORT PRIOR TO ADMISSION: The patient lived with son but did not require assistance.  Patient within the last week with increased balance deficits and requires external assistance from family    Physical Therapy Goals  1.  Patient will move from supine to sit and sit to  supine in bed with contact guard assist within 7 day(s).    2.  Patient will perform sit to stand with contact guard assist within 7 day(s).  3.  Patient will transfer from bed to chair and chair to bed with contact guard assist using the least restrictive device within 7 day(s).  4.  Patient will ambulate with contact guard assist for 50 feet with the least restrictive device within 7 day(s).   5.  Patient will ascend/descend 4 stairs with 2 handrail(s) with minimal assistance within 7 day(s).   4/10/2025 1337 by Cindy Garcia, PT  Outcome: Progressing      Problem: Neurosensory - Adult  Goal: Achieves stable or improved neurological status  Outcome: Progressing  Goal: Absence of seizures  Outcome: Progressing  Goal: Remains free of injury related to seizures activity  Outcome: Progressing  Goal: Achieves maximal functionality and self care  Outcome: Progressing

## 2025-04-11 NOTE — PROGRESS NOTES
[address] [id number]   Office [phone]  Fax [phone]       Discharge / Transfer / Off-Service Note     Name: Brandy Dennison MRN: [id number]  Sex: Female   YOB: 1965  Age: 59 y.o.  PCP: Antoinette Weinstein APRN - NP     Date of admission: 4/9/2025  Date of discharge/transfer: 4/11/2025    Attending physician at admission: Dr. Franks    Attending physician at discharge/transfer: Dr. Castro    Resident physician at discharge/transfer: Klever Galvez MD     Consultants during hospitalization  IP CONSULT TO NEUROLOGY  IP CONSULT TO STROKE COORDINATOR  IP CONSULT TO CASE MANAGEMENT     Admission diagnoses   Dysarthria [R47.1]  Cerebrovascular accident (CVA), unspecified mechanism (HCC) [I63.9]  Recommended follow-up after discharge  PCP: Antoinette Weinstein APRN - NP    Things to follow up on with PCP:  Seizure hx  Ophthalmology - blurry vision, needs an eye check  Anxiety    History of Present Illness  Per admitting provider,  Dr. Franks    \"Ms. Dennison is a 59 y.o. female who is being admitted for Dysarthria. Ms. Dennison presented to our Emergency Department today complaining of  slurred speech.  Patient has a history of seizure disorder, followed by the [facility] clinic at [facility].  Compliant with all medications.  States that starting approximately 2 days ago, she started feeling dizzy and unsteady on her feet.  This symptom has persisted.  Then, this morning, was noted to have slurred speech that has also been persistent.  Denies any numbness/weakness/tingling in extremities, facial droop or other abnormalities.     In the emergency department, basic labs including CBC and BMP were unremarkable.  CT head was negative for acute findings.  CTA head and neck were also unremarkable.  Due to persistent symptoms of dysarthria and ataxia, however, she will be observed overnight for MRI and neurological evaluation.\"    HOSPITAL COURSE    Dysarthria: POA. Patient currently  alert, oriented x4, mild dysarthria, speech fully intelligible, speech slowed, no focal findings. Strength 5/5 in upper extremities, 4/5 equal in bilateral lower extremities.  Extremities - no pedal edema      Condition at discharge: Stable    Labs  Recent Labs     04/09/25  1432 04/10/25  0136 04/11/25  0206   WBC 8.4 8.1 8.0   HGB 12.4 12.3 12.0   HCT 36.3 36.6 35.3    204 199     Recent Labs     04/09/25  1432 04/10/25  0136 04/11/25  0206    141 140   K 3.6 3.7 4.7    105 106   CO2 31 29 29   BUN 9 7 8     Recent Labs     04/09/25  1432   ALT 17   GLOB 4.0     Recent Labs     04/09/25  1430 04/10/25  0136   INR 1.0 1.1       Microbiology  No components found for: \"CULT\"    Imaging:  MRI brain without contrast  Result Date: 4/9/2025  BRAIN MRI WITHOUT CONTRAST: 4/9/2025 9:10 PM INDICATION:Dysarthria, ataxia COMPARISON: None. TECHNIQUE: Images were obtained in multiple planes and sequences to emphasize T1, T2, and T2* information. Diffusion-weighted images and ADC maps were also obtained. FINDINGS: No restricted diffusion to suggest acute infarction. A few tiny white matter signal abnormalities are consistent with minimal, chronic, small vessel ischemic disease. The ventricles and sulci are appropriate for age. The main intracranial arterial flow-voids are normal. No hemorrhage.     No acute intracranial abnormality. Electronically signed by Javier Wheeler    CTA HEAD NECK W CONTRAST  Result Date: 4/9/2025  EXAM: CTA HEAD NECK W CONTRAST CLINICAL: ataxia, dysarthria x 3d COMPARISON: August 2019 TECHNIQUE: Multiple CTA images of the head and neck were obtained after the rapid bolus iodinated intravenous contrast administration. MIP reformats or 3-dimensional volume related imaging created and submitted for evaluation. ALARA Utilization: Dose reduction technique was used including one or more of the following: automated exposure control, iterative reconstruction technique, adjustment of mA and

## 2025-04-11 NOTE — DISCHARGE SUMMARY
[address] [id number]   Office [phone]  Fax [phone]       Discharge / Transfer / Off-Service Note     Name: Brandy Dennison MRN: [id number]  Sex: Female   YOB: 1965  Age: 59 y.o.  PCP: Antoinette Weinstein APRN - NP     Date of admission: 4/9/2025  Date of discharge/transfer: 4/11/2025    Attending physician at admission: Dr. Franks    Attending physician at discharge/transfer: Dr. Castro    Resident physician at discharge/transfer: Klever Galvez MD     Consultants during hospitalization  IP CONSULT TO NEUROLOGY  IP CONSULT TO STROKE COORDINATOR  IP CONSULT TO CASE MANAGEMENT     Admission diagnoses   Dysarthria [R47.1]  Cerebrovascular accident (CVA), unspecified mechanism (HCC) [I63.9]  Recommended follow-up after discharge  PCP: Antoinette Weinstein APRN - NP    Things to follow up on with PCP:  Seizure hx  Ophthalmology - blurry vision, needs an eye check  Anxiety    History of Present Illness  Per admitting provider,  Dr. Franks    \"Ms. Dennison is a 59 y.o. female who is being admitted for Dysarthria. Ms. Dennison presented to our Emergency Department today complaining of  slurred speech.  Patient has a history of seizure disorder, followed by the [facility] clinic at [facility].  Compliant with all medications.  States that starting approximately 2 days ago, she started feeling dizzy and unsteady on her feet.  This symptom has persisted.  Then, this morning, was noted to have slurred speech that has also been persistent.  Denies any numbness/weakness/tingling in extremities, facial droop or other abnormalities.     In the emergency department, basic labs including CBC and BMP were unremarkable.  CT head was negative for acute findings.  CTA head and neck were also unremarkable.  Due to persistent symptoms of dysarthria and ataxia, however, she will be observed overnight for MRI and neurological evaluation.\"    HOSPITAL COURSE    Dysarthria: POA. Patient currently  attention for any new or worsening symptoms particularly fever, chest pain, shortness of breath, abdominal pain, nausea, vomiting    Follow up plans/appointments  No future appointments.    Patient discussed with Dr. Castro (attending)    Klever Galvez MD  Family Medicine Resident       For Billing    Chief Complaint   Patient presents with    Aphasia    Fatigue

## 2025-04-11 NOTE — PROGRESS NOTES
[address] [place] [address]   Office [phone]  Fax [phone]          Subjective / Objective     Subjective  Overnight Events: DANELLE.     Patient seen and examined at bedside. Reports slurred speech is improved today, but still \"feels off.\" Reports 1 episode of vomiting yesterday, thinks it was related to the type of food. Improved with soup for a later meal. Denies headache, CP, SOB, focal weakness, sensory changes.     Respiratory:   RA  Vitals:    04/11/25 0813   BP: (!) 145/92   Pulse: 73   Resp: 16   Temp: 97.3 °F (36.3 °C)   SpO2: 96%     Physical Examination:   General appearance - alert and in no distress  HEENT: PERRL, clear conjunctiva, moist mucus membranes.  Chest - clear to auscultation, no wheezes, rales or rhonchi, symmetric air entry  Heart - normal rate, regular rhythm, normal S1, S2, no murmurs, rubs, clicks or gallops  Abdomen - soft, nontender, nondistended  Neurological - alert, oriented x4, mild dysarthria, speech fully intelligible, speech slowed, no focal findings. Strength 5/5 in upper extremities, 4/5 equal in bilateral lower extremities.  Extremities - no pedal edema    I/O:  04/10 0701 - 04/11 0700  In: 400 [P.O.:400]  Out: 1500 [Urine:1500]  Inpatient Medications  Current Facility-Administered Medications   Medication Dose Route Frequency    ascorbic acid (VITAMIN C) tablet 250 mg  250 mg Oral Dinner    acetaminophen (TYLENOL) tablet 650 mg  650 mg Oral Q4H PRN    carBAMazepine (CARBATROL) extended release capsule 300 mg  300 mg Oral Q12H    clonazePAM (KLONOPIN) tablet 0.5 mg  0.5 mg Oral TID PRN    lamoTRIgine (LAMICTAL) tablet 200 mg  200 mg Oral Q12H    levETIRAcetam (KEPPRA) tablet 750 mg  750 mg Oral Q12H    sodium chloride flush 0.9 % injection 5-40 mL  5-40 mL IntraVENous 2 times per day    sodium chloride flush 0.9 % injection 5-40 mL  5-40 mL IntraVENous PRN    0.9 % sodium chloride infusion   IntraVENous PRN    ondansetron (ZOFRAN-ODT) disintegrating

## 2025-04-14 ENCOUNTER — TELEPHONE (OUTPATIENT)
Facility: CLINIC | Age: 60
End: 2025-04-14

## 2025-04-14 NOTE — TELEPHONE ENCOUNTER
Care Transitions Initial Follow Up Call    Outreach made within 2 business days of discharge: Yes    Patient: Brandy Dennison Patient : 1965   MRN: [id number]  Reason for Admission: Dysarthria and CVA  Discharge Date: 25       Spoke with: LEFT MESSAGE FOR PATIENT TO CALL BACK    Discharge department/facility: [place]    No future appointments.    Raven Esquivel LPN

## 2025-05-09 RX ORDER — ROSUVASTATIN CALCIUM 40 MG/1
40 TABLET, COATED ORAL NIGHTLY
Qty: 30 TABLET | Refills: 0 | OUTPATIENT
Start: 2025-05-09

## 2025-05-30 RX ORDER — ROSUVASTATIN CALCIUM 40 MG/1
40 TABLET, COATED ORAL NIGHTLY
Qty: 30 TABLET | Refills: 0 | OUTPATIENT
Start: 2025-05-30

## (undated) DEVICE — DRAPE,LAPAROTOMY,PED,STERILE: Brand: MEDLINE

## (undated) DEVICE — GOWN,SIRUS,NONRNF,SETINSLV,2XL,18/CS: Brand: MEDLINE

## (undated) DEVICE — BONE WAX WHITE: Brand: BONE WAX WHITE

## (undated) DEVICE — (D)SYR 10ML 1/5ML GRAD NSAF -- PKGING CHANGE USE ITEM 338027

## (undated) DEVICE — DEVON™ KNEE AND BODY STRAP 60" X 3" (1.5 M X 7.6 CM): Brand: DEVON

## (undated) DEVICE — INSULATED BLADE ELECTRODE: Brand: EDGE

## (undated) DEVICE — GAUZE SPONGES,12 PLY: Brand: CURITY

## (undated) DEVICE — 3000CC GUARDIAN II: Brand: GUARDIAN

## (undated) DEVICE — TRAP SUC MUCOUS 70ML -- MEDICHOICE MEDLINE

## (undated) DEVICE — MAGNETIC DRAPE: Brand: DEVON

## (undated) DEVICE — TOWEL SURG W17XL27IN STD BLU COT NONFENESTRATED PREWASHED

## (undated) DEVICE — SUTURE MCRYL SZ 4-0 L27IN ABSRB UD L19MM PS-2 1/2 CIR PRIM Y426H

## (undated) DEVICE — OVERLAY MATRS H2IN FOAM CONVOLUTED STD DENS

## (undated) DEVICE — DRAPE XR C ARM 41X74IN LF --

## (undated) DEVICE — COVER,MAYO STAND,STERILE: Brand: MEDLINE

## (undated) DEVICE — INTENDED FOR TISSUE SEPARATION, AND OTHER PROCEDURES THAT REQUIRE A SHARP SURGICAL BLADE TO PUNCTURE OR CUT.: Brand: BARD-PARKER ® CARBON RIB-BACK BLADES

## (undated) DEVICE — CODMAN® SURGICAL PATTIES 3/4" X 3/4" (1.91CM X 1.91CM): Brand: CODMAN®

## (undated) DEVICE — SPONGE: SPECIALTY PEANUT XR 100/CS: Brand: MEDICAL ACTION INDUSTRIES

## (undated) DEVICE — DRSG AQUACEL SURG 3.5 X 10IN -- CONVERT TO ITEM 370183

## (undated) DEVICE — COVER LT HNDL BLU PLAS

## (undated) DEVICE — STERILE POLYISOPRENE POWDER-FREE SURGICAL GLOVES: Brand: PROTEXIS

## (undated) DEVICE — INFECTION CONTROL KIT SYS

## (undated) DEVICE — 3M™ DURAPORE™ SURGICAL TAPE 1538-3, 3 INCH X 10 YARD (7,5CM X 9,1M), 4 ROLLS/BOX: Brand: 3M™ DURAPORE™

## (undated) DEVICE — TELFA ADHESIVE ISLAND DRESSING: Brand: TELFA

## (undated) DEVICE — TRAY CATH OD16FR SIL URIN M STATLOK STBL DEV SURSTP

## (undated) DEVICE — COVER,TABLE,60X90,STERILE: Brand: MEDLINE

## (undated) DEVICE — Z CONVERTED USE 2271043 CONTAINER SPEC COLL 4OZ SCR ON LID PEEL PCH

## (undated) DEVICE — TRAY PREP DRY W/ PREM GLV 2 APPL 6 SPNG 2 UNDPD 1 OVERWRAP

## (undated) DEVICE — NDL PRT INJ NSAF BLNT 18GX1.5 --

## (undated) DEVICE — 1010 S-DRAPE TOWEL DRAPE 10/BX: Brand: STERI-DRAPE™

## (undated) DEVICE — (D)STRIP SKN CLSR 0.5X4IN WHT --

## (undated) DEVICE — PACKING 8004000 NEURAY 200PK 13X13MM: Brand: NEURAY ®

## (undated) DEVICE — STERILE POLYISOPRENE POWDER-FREE SURGICAL GLOVES WITH EMOLLIENT COATING: Brand: PROTEXIS

## (undated) DEVICE — DRAPE,REIN 53X77,STERILE: Brand: MEDLINE

## (undated) DEVICE — SUTURE VCRL SZ 3-0 L27IN ABSRB UD L26MM SH 1/2 CIR J416H

## (undated) DEVICE — SOLUTION IV 1000ML 0.9% SOD CHL

## (undated) DEVICE — SILVER-COATED ANTIBACTERIAL BARRIER DRESSING: Brand: ACTICOAT SURGIC 10X12CM 5PK US

## (undated) DEVICE — DRAPE,UTILITY,TAPE,15X26,STERILE: Brand: MEDLINE

## (undated) DEVICE — NDL SPNE QNCKE 18GX3.5IN LF --

## (undated) DEVICE — BIPOLAR FORCEPS CORD,BANANA LEADS: Brand: VALLEYLAB

## (undated) DEVICE — BASIC PACK: Brand: CONVERTORS

## (undated) DEVICE — ROCKER SWITCH PENCIL BLADE ELECTRODE, HOLSTER: Brand: EDGE

## (undated) DEVICE — DERMABOND SKIN ADH 0.7ML -- DERMABOND ADVANCED 12/BX

## (undated) DEVICE — 12MM DRILL BIT

## (undated) DEVICE — SLIM BODY SKIN STAPLER: Brand: APPOSE ULC

## (undated) DEVICE — SOLUTION IRRIG 1000ML H2O STRL BLT

## (undated) DEVICE — REM POLYHESIVE ADULT PATIENT RETURN ELECTRODE: Brand: VALLEYLAB

## (undated) DEVICE — COLLAR CERV UNIV ADJ AD -- VISTA MULTIPOST

## (undated) DEVICE — SYR LR LCK 1ML GRAD NSAF 30ML --

## (undated) DEVICE — 3M™ IOBAN™ 2 ANTIMICROBIAL INCISE DRAPE 6650EZ: Brand: IOBAN™ 2

## (undated) DEVICE — SURGICAL PROCEDURE PACK BASIN MAJ SET CUST NO CAUT

## (undated) DEVICE — SCREW EXT FIX L14MM FOR DISTRCTN

## (undated) DEVICE — DRAPE MICSCP XLN W48XL118IN 2 BRDG STEREO OBS ZEISS

## (undated) DEVICE — (D)PREP SKN CHLRAPRP APPL 26ML -- CONVERT TO ITEM 371833